# Patient Record
Sex: MALE | Race: WHITE | NOT HISPANIC OR LATINO
[De-identification: names, ages, dates, MRNs, and addresses within clinical notes are randomized per-mention and may not be internally consistent; named-entity substitution may affect disease eponyms.]

---

## 2017-04-11 ENCOUNTER — APPOINTMENT (OUTPATIENT)
Dept: GASTROENTEROLOGY | Facility: CLINIC | Age: 72
End: 2017-04-11

## 2017-04-11 VITALS
DIASTOLIC BLOOD PRESSURE: 68 MMHG | HEIGHT: 70 IN | HEART RATE: 65 BPM | BODY MASS INDEX: 24.77 KG/M2 | SYSTOLIC BLOOD PRESSURE: 118 MMHG | TEMPERATURE: 98.1 F | RESPIRATION RATE: 15 BRPM | OXYGEN SATURATION: 98 % | WEIGHT: 173 LBS

## 2017-04-19 ENCOUNTER — APPOINTMENT (OUTPATIENT)
Dept: OPHTHALMOLOGY | Facility: CLINIC | Age: 72
End: 2017-04-19

## 2017-06-07 ENCOUNTER — APPOINTMENT (OUTPATIENT)
Dept: GASTROENTEROLOGY | Facility: AMBULATORY MEDICAL SERVICES | Age: 72
End: 2017-06-07

## 2018-04-04 ENCOUNTER — NON-APPOINTMENT (OUTPATIENT)
Age: 73
End: 2018-04-04

## 2018-04-04 ENCOUNTER — TRANSCRIPTION ENCOUNTER (OUTPATIENT)
Age: 73
End: 2018-04-04

## 2018-04-04 ENCOUNTER — APPOINTMENT (OUTPATIENT)
Dept: ELECTROPHYSIOLOGY | Facility: CLINIC | Age: 73
End: 2018-04-04
Payer: MEDICARE

## 2018-04-04 VITALS
WEIGHT: 162 LBS | DIASTOLIC BLOOD PRESSURE: 83 MMHG | OXYGEN SATURATION: 98 % | HEIGHT: 70 IN | HEART RATE: 68 BPM | SYSTOLIC BLOOD PRESSURE: 135 MMHG | BODY MASS INDEX: 23.19 KG/M2

## 2018-04-04 PROCEDURE — 99205 OFFICE O/P NEW HI 60 MIN: CPT

## 2018-04-04 PROCEDURE — 93000 ELECTROCARDIOGRAM COMPLETE: CPT

## 2018-04-04 RX ORDER — SODIUM SULFATE, POTASSIUM SULFATE, MAGNESIUM SULFATE 17.5; 3.13; 1.6 G/ML; G/ML; G/ML
17.5-3.13-1.6 SOLUTION, CONCENTRATE ORAL
Qty: 1 | Refills: 0 | Status: DISCONTINUED | COMMUNITY
Start: 2017-04-11 | End: 2018-04-04

## 2018-04-04 RX ORDER — BIOTIN 10 MG
TABLET ORAL
Refills: 0 | Status: ACTIVE | COMMUNITY

## 2018-04-08 ENCOUNTER — FORM ENCOUNTER (OUTPATIENT)
Age: 73
End: 2018-04-08

## 2018-04-24 ENCOUNTER — APPOINTMENT (OUTPATIENT)
Dept: GASTROENTEROLOGY | Facility: CLINIC | Age: 73
End: 2018-04-24

## 2018-05-18 ENCOUNTER — INPATIENT (INPATIENT)
Facility: HOSPITAL | Age: 73
LOS: 1 days | Discharge: HOME | DRG: 438 | End: 2018-05-20
Attending: INTERNAL MEDICINE | Admitting: INTERNAL MEDICINE
Payer: MEDICARE

## 2018-05-18 DIAGNOSIS — Z85.028 PERSONAL HISTORY OF OTHER MALIGNANT NEOPLASM OF STOMACH: ICD-10-CM

## 2018-05-18 DIAGNOSIS — K85.90 ACUTE PANCREATITIS WITHOUT NECROSIS OR INFECTION, UNSPECIFIED: ICD-10-CM

## 2018-05-18 DIAGNOSIS — Z90.3 ACQUIRED ABSENCE OF STOMACH [PART OF]: ICD-10-CM

## 2018-05-18 DIAGNOSIS — Z87.891 PERSONAL HISTORY OF NICOTINE DEPENDENCE: ICD-10-CM

## 2018-05-18 DIAGNOSIS — Z85.528 PERSONAL HISTORY OF OTHER MALIGNANT NEOPLASM OF KIDNEY: ICD-10-CM

## 2018-05-18 DIAGNOSIS — E78.5 HYPERLIPIDEMIA, UNSPECIFIED: ICD-10-CM

## 2018-05-18 DIAGNOSIS — I10 ESSENTIAL (PRIMARY) HYPERTENSION: ICD-10-CM

## 2018-05-18 DIAGNOSIS — I47.1 SUPRAVENTRICULAR TACHYCARDIA: ICD-10-CM

## 2018-05-18 DIAGNOSIS — K56.2 VOLVULUS: ICD-10-CM

## 2018-05-18 PROCEDURE — 99223 1ST HOSP IP/OBS HIGH 75: CPT

## 2018-05-18 PROCEDURE — 76705 ECHO EXAM OF ABDOMEN: CPT | Mod: 26,RT

## 2018-05-18 PROCEDURE — 93010 ELECTROCARDIOGRAM REPORT: CPT

## 2018-05-18 PROCEDURE — 71045 X-RAY EXAM CHEST 1 VIEW: CPT | Mod: 26,76

## 2018-05-18 PROCEDURE — 74176 CT ABD & PELVIS W/O CONTRAST: CPT | Mod: 26

## 2018-05-19 PROCEDURE — 99233 SBSQ HOSP IP/OBS HIGH 50: CPT

## 2018-05-20 PROCEDURE — 96374 THER/PROPH/DIAG INJ IV PUSH: CPT

## 2018-05-20 PROCEDURE — 83605 ASSAY OF LACTIC ACID: CPT

## 2018-05-20 PROCEDURE — 84484 ASSAY OF TROPONIN QUANT: CPT

## 2018-05-20 PROCEDURE — 84100 ASSAY OF PHOSPHORUS: CPT

## 2018-05-20 PROCEDURE — 82248 BILIRUBIN DIRECT: CPT

## 2018-05-20 PROCEDURE — 93005 ELECTROCARDIOGRAM TRACING: CPT

## 2018-05-20 PROCEDURE — 83735 ASSAY OF MAGNESIUM: CPT

## 2018-05-20 PROCEDURE — 83615 LACTATE (LD) (LDH) ENZYME: CPT

## 2018-05-20 PROCEDURE — 96361 HYDRATE IV INFUSION ADD-ON: CPT

## 2018-05-20 PROCEDURE — 76705 ECHO EXAM OF ABDOMEN: CPT

## 2018-05-20 PROCEDURE — 80048 BASIC METABOLIC PNL TOTAL CA: CPT

## 2018-05-20 PROCEDURE — 80053 COMPREHEN METABOLIC PANEL: CPT

## 2018-05-20 PROCEDURE — 99233 SBSQ HOSP IP/OBS HIGH 50: CPT

## 2018-05-20 PROCEDURE — 85027 COMPLETE CBC AUTOMATED: CPT

## 2018-05-20 PROCEDURE — 85730 THROMBOPLASTIN TIME PARTIAL: CPT

## 2018-05-20 PROCEDURE — 85610 PROTHROMBIN TIME: CPT

## 2018-05-20 PROCEDURE — 96376 TX/PRO/DX INJ SAME DRUG ADON: CPT

## 2018-05-20 PROCEDURE — 74018 RADEX ABDOMEN 1 VIEW: CPT

## 2018-05-20 PROCEDURE — 80076 HEPATIC FUNCTION PANEL: CPT

## 2018-05-20 PROCEDURE — 86900 BLOOD TYPING SEROLOGIC ABO: CPT

## 2018-05-20 PROCEDURE — 80061 LIPID PANEL: CPT

## 2018-05-20 PROCEDURE — 74176 CT ABD & PELVIS W/O CONTRAST: CPT

## 2018-05-20 PROCEDURE — 71045 X-RAY EXAM CHEST 1 VIEW: CPT

## 2018-05-20 PROCEDURE — 82150 ASSAY OF AMYLASE: CPT

## 2018-05-20 PROCEDURE — 86850 RBC ANTIBODY SCREEN: CPT

## 2018-05-20 PROCEDURE — 99285 EMERGENCY DEPT VISIT HI MDM: CPT | Mod: 25

## 2018-05-20 PROCEDURE — 74018 RADEX ABDOMEN 1 VIEW: CPT | Mod: 26

## 2018-05-20 PROCEDURE — 96375 TX/PRO/DX INJ NEW DRUG ADDON: CPT

## 2018-05-20 PROCEDURE — 83690 ASSAY OF LIPASE: CPT

## 2018-06-15 ENCOUNTER — APPOINTMENT (OUTPATIENT)
Dept: CT IMAGING | Facility: CLINIC | Age: 73
End: 2018-06-15

## 2018-06-15 ENCOUNTER — OUTPATIENT (OUTPATIENT)
Dept: OUTPATIENT SERVICES | Facility: HOSPITAL | Age: 73
LOS: 1 days | End: 2018-06-15
Payer: MEDICARE

## 2018-06-15 DIAGNOSIS — Z00.8 ENCOUNTER FOR OTHER GENERAL EXAMINATION: ICD-10-CM

## 2018-06-15 PROCEDURE — 74177 CT ABD & PELVIS W/CONTRAST: CPT | Mod: 26

## 2018-06-15 PROCEDURE — 74177 CT ABD & PELVIS W/CONTRAST: CPT

## 2018-06-19 ENCOUNTER — CHART COPY (OUTPATIENT)
Age: 73
End: 2018-06-19

## 2018-08-27 ENCOUNTER — OUTPATIENT (OUTPATIENT)
Dept: OUTPATIENT SERVICES | Facility: HOSPITAL | Age: 73
LOS: 1 days | End: 2018-08-27
Payer: MEDICARE

## 2018-08-27 VITALS
HEART RATE: 59 BPM | DIASTOLIC BLOOD PRESSURE: 81 MMHG | RESPIRATION RATE: 16 BRPM | OXYGEN SATURATION: 100 % | HEIGHT: 69 IN | WEIGHT: 151.9 LBS | SYSTOLIC BLOOD PRESSURE: 130 MMHG | TEMPERATURE: 98 F

## 2018-08-27 DIAGNOSIS — I49.9 CARDIAC ARRHYTHMIA, UNSPECIFIED: ICD-10-CM

## 2018-08-27 DIAGNOSIS — Z01.818 ENCOUNTER FOR OTHER PREPROCEDURAL EXAMINATION: ICD-10-CM

## 2018-08-27 LAB
ANION GAP SERPL CALC-SCNC: 9 MMOL/L — SIGNIFICANT CHANGE UP (ref 5–17)
APTT BLD: 30 SEC — SIGNIFICANT CHANGE UP (ref 27.5–37.4)
BLD GP AB SCN SERPL QL: NEGATIVE — SIGNIFICANT CHANGE UP
BUN SERPL-MCNC: 13 MG/DL — SIGNIFICANT CHANGE UP (ref 7–23)
CALCIUM SERPL-MCNC: 9 MG/DL — SIGNIFICANT CHANGE UP (ref 8.4–10.5)
CHLORIDE SERPL-SCNC: 104 MMOL/L — SIGNIFICANT CHANGE UP (ref 96–108)
CO2 SERPL-SCNC: 25 MMOL/L — SIGNIFICANT CHANGE UP (ref 22–31)
CREAT SERPL-MCNC: 0.9 MG/DL — SIGNIFICANT CHANGE UP (ref 0.5–1.3)
GLUCOSE SERPL-MCNC: 106 MG/DL — HIGH (ref 70–99)
HCT VFR BLD CALC: 39.9 % — SIGNIFICANT CHANGE UP (ref 39–50)
HGB BLD-MCNC: 13.1 G/DL — SIGNIFICANT CHANGE UP (ref 13–17)
INR BLD: 0.95 RATIO — SIGNIFICANT CHANGE UP (ref 0.88–1.16)
MCHC RBC-ENTMCNC: 31 PG — SIGNIFICANT CHANGE UP (ref 27–34)
MCHC RBC-ENTMCNC: 32.8 GM/DL — SIGNIFICANT CHANGE UP (ref 32–36)
MCV RBC AUTO: 94.4 FL — SIGNIFICANT CHANGE UP (ref 80–100)
PLATELET # BLD AUTO: 174 K/UL — SIGNIFICANT CHANGE UP (ref 150–400)
POTASSIUM SERPL-MCNC: 4.1 MMOL/L — SIGNIFICANT CHANGE UP (ref 3.5–5.3)
POTASSIUM SERPL-SCNC: 4.1 MMOL/L — SIGNIFICANT CHANGE UP (ref 3.5–5.3)
PROTHROM AB SERPL-ACNC: 10.3 SEC — SIGNIFICANT CHANGE UP (ref 9.8–12.7)
RBC # BLD: 4.22 M/UL — SIGNIFICANT CHANGE UP (ref 4.2–5.8)
RBC # FLD: 13.2 % — SIGNIFICANT CHANGE UP (ref 10.3–14.5)
RH IG SCN BLD-IMP: POSITIVE — SIGNIFICANT CHANGE UP
SODIUM SERPL-SCNC: 138 MMOL/L — SIGNIFICANT CHANGE UP (ref 135–145)
WBC # BLD: 3.1 K/UL — LOW (ref 3.8–10.5)
WBC # FLD AUTO: 3.1 K/UL — LOW (ref 3.8–10.5)

## 2018-08-27 PROCEDURE — 93010 ELECTROCARDIOGRAM REPORT: CPT

## 2018-08-27 NOTE — H&P CARDIOLOGY - PMH
HTN (hypertension)    Pancreatitis    Renal cell carcinoma, unspecified laterality    SVT (supraventricular tachycardia)

## 2018-08-27 NOTE — H&P CARDIOLOGY - HISTORY OF PRESENT ILLNESS
73 year old  male with pmhx HTN, renal cell CA, partial nephrectomy, and  gastrectomy presents as a PST for SVT ablation on 8/28/18.  The patient states he has felt palpitations for over a year. Initially, the palpitations occurred while he was playing tennis and eventually progressed to occurring at rest. He states vagal maneuvers have helped in the past, but are not working anymore. The patient resides in [place] and was noted to be in SVT while admitted for pancreatitis in  Feb 2018. He was evaluated by Dr. Gresham and placed on a holter monitor which revealed episodes of SVT. The patien was advised to hold his metoprolol 2 days prior to procedure.  He currently denies chest pain, shortness of breath, dizziness or orthopnea.

## 2018-08-28 ENCOUNTER — OUTPATIENT (OUTPATIENT)
Dept: INPATIENT UNIT | Facility: HOSPITAL | Age: 73
LOS: 1 days | End: 2018-08-28
Payer: MEDICARE

## 2018-08-28 DIAGNOSIS — I49.9 CARDIAC ARRHYTHMIA, UNSPECIFIED: ICD-10-CM

## 2018-08-28 DIAGNOSIS — Z90.49 ACQUIRED ABSENCE OF OTHER SPECIFIED PARTS OF DIGESTIVE TRACT: Chronic | ICD-10-CM

## 2018-08-28 DIAGNOSIS — Z90.5 ACQUIRED ABSENCE OF KIDNEY: Chronic | ICD-10-CM

## 2018-08-28 DIAGNOSIS — Z90.3 ACQUIRED ABSENCE OF STOMACH [PART OF]: Chronic | ICD-10-CM

## 2018-08-28 DIAGNOSIS — Z98.890 OTHER SPECIFIED POSTPROCEDURAL STATES: Chronic | ICD-10-CM

## 2018-08-28 LAB
BLD GP AB SCN SERPL QL: NEGATIVE — SIGNIFICANT CHANGE UP
RH IG SCN BLD-IMP: POSITIVE — SIGNIFICANT CHANGE UP

## 2018-08-28 PROCEDURE — 93653 COMPRE EP EVAL TX SVT: CPT

## 2018-08-28 PROCEDURE — 85610 PROTHROMBIN TIME: CPT

## 2018-08-28 PROCEDURE — 93005 ELECTROCARDIOGRAM TRACING: CPT

## 2018-08-28 PROCEDURE — 85027 COMPLETE CBC AUTOMATED: CPT

## 2018-08-28 PROCEDURE — 86900 BLOOD TYPING SEROLOGIC ABO: CPT

## 2018-08-28 PROCEDURE — 93621 COMP EP EVL L PAC&REC C SINS: CPT

## 2018-08-28 PROCEDURE — 85730 THROMBOPLASTIN TIME PARTIAL: CPT

## 2018-08-28 PROCEDURE — 86850 RBC ANTIBODY SCREEN: CPT

## 2018-08-28 PROCEDURE — 80048 BASIC METABOLIC PNL TOTAL CA: CPT

## 2018-08-28 PROCEDURE — 93613 INTRACARDIAC EPHYS 3D MAPG: CPT

## 2018-08-28 PROCEDURE — 93623 PRGRMD STIMJ&PACG IV RX NFS: CPT

## 2018-08-28 PROCEDURE — 86901 BLOOD TYPING SEROLOGIC RH(D): CPT

## 2018-08-28 PROCEDURE — 93662 INTRACARDIAC ECG (ICE): CPT

## 2018-08-28 PROCEDURE — C1732: CPT

## 2018-08-28 PROCEDURE — C1894: CPT

## 2018-08-28 PROCEDURE — C1766: CPT

## 2018-08-28 PROCEDURE — C1730: CPT

## 2018-09-05 RX ORDER — ATENOLOL 25 MG/1
40 TABLET ORAL
Qty: 0 | Refills: 0 | COMMUNITY

## 2018-09-17 ENCOUNTER — APPOINTMENT (OUTPATIENT)
Dept: ELECTROPHYSIOLOGY | Facility: CLINIC | Age: 73
End: 2018-09-17

## 2018-09-21 ENCOUNTER — APPOINTMENT (OUTPATIENT)
Dept: GASTROENTEROLOGY | Facility: CLINIC | Age: 73
End: 2018-09-21
Payer: MEDICARE

## 2018-09-21 VITALS
SYSTOLIC BLOOD PRESSURE: 116 MMHG | RESPIRATION RATE: 15 BRPM | OXYGEN SATURATION: 98 % | BODY MASS INDEX: 22.19 KG/M2 | HEIGHT: 70 IN | TEMPERATURE: 98 F | HEART RATE: 70 BPM | DIASTOLIC BLOOD PRESSURE: 60 MMHG | WEIGHT: 155 LBS

## 2018-09-21 PROCEDURE — 99214 OFFICE O/P EST MOD 30 MIN: CPT

## 2019-06-21 PROBLEM — I10 ESSENTIAL (PRIMARY) HYPERTENSION: Chronic | Status: ACTIVE | Noted: 2018-08-27

## 2019-06-24 ENCOUNTER — APPOINTMENT (OUTPATIENT)
Dept: RADIOLOGY | Facility: HOSPITAL | Age: 74
End: 2019-06-24
Payer: MEDICARE

## 2019-06-24 ENCOUNTER — INPATIENT (INPATIENT)
Facility: HOSPITAL | Age: 74
LOS: 0 days | Discharge: HOME | End: 2019-06-25
Attending: HOSPITALIST | Admitting: HOSPITALIST
Payer: MEDICARE

## 2019-06-24 ENCOUNTER — OUTPATIENT (OUTPATIENT)
Dept: OUTPATIENT SERVICES | Facility: HOSPITAL | Age: 74
LOS: 1 days | End: 2019-06-24

## 2019-06-24 ENCOUNTER — TRANSCRIPTION ENCOUNTER (OUTPATIENT)
Age: 74
End: 2019-06-24

## 2019-06-24 VITALS
OXYGEN SATURATION: 100 % | RESPIRATION RATE: 16 BRPM | DIASTOLIC BLOOD PRESSURE: 92 MMHG | TEMPERATURE: 99 F | HEART RATE: 75 BPM | SYSTOLIC BLOOD PRESSURE: 141 MMHG

## 2019-06-24 DIAGNOSIS — Z98.890 OTHER SPECIFIED POSTPROCEDURAL STATES: Chronic | ICD-10-CM

## 2019-06-24 DIAGNOSIS — Z90.3 ACQUIRED ABSENCE OF STOMACH [PART OF]: Chronic | ICD-10-CM

## 2019-06-24 DIAGNOSIS — I47.1 SUPRAVENTRICULAR TACHYCARDIA: ICD-10-CM

## 2019-06-24 DIAGNOSIS — Z90.49 ACQUIRED ABSENCE OF OTHER SPECIFIED PARTS OF DIGESTIVE TRACT: Chronic | ICD-10-CM

## 2019-06-24 DIAGNOSIS — Z29.9 ENCOUNTER FOR PROPHYLACTIC MEASURES, UNSPECIFIED: ICD-10-CM

## 2019-06-24 DIAGNOSIS — Z90.5 ACQUIRED ABSENCE OF KIDNEY: Chronic | ICD-10-CM

## 2019-06-24 DIAGNOSIS — C16.9 MALIGNANT NEOPLASM OF STOMACH, UNSPECIFIED: ICD-10-CM

## 2019-06-24 DIAGNOSIS — K92.0 HEMATEMESIS: ICD-10-CM

## 2019-06-24 LAB
ALBUMIN SERPL ELPH-MCNC: 3.7 G/DL — SIGNIFICANT CHANGE UP (ref 3.3–5)
ALP SERPL-CCNC: 85 U/L — SIGNIFICANT CHANGE UP (ref 40–120)
ALT FLD-CCNC: 17 U/L — SIGNIFICANT CHANGE UP (ref 4–41)
ANION GAP SERPL CALC-SCNC: 12 MMO/L — SIGNIFICANT CHANGE UP (ref 7–14)
APTT BLD: 28.7 SEC — SIGNIFICANT CHANGE UP (ref 27.5–36.3)
AST SERPL-CCNC: 27 U/L — SIGNIFICANT CHANGE UP (ref 4–40)
BASOPHILS # BLD AUTO: 0.01 K/UL — SIGNIFICANT CHANGE UP (ref 0–0.2)
BASOPHILS NFR BLD AUTO: 0.2 % — SIGNIFICANT CHANGE UP (ref 0–2)
BILIRUB SERPL-MCNC: 0.6 MG/DL — SIGNIFICANT CHANGE UP (ref 0.2–1.2)
BLD GP AB SCN SERPL QL: NEGATIVE — SIGNIFICANT CHANGE UP
BUN SERPL-MCNC: 21 MG/DL — SIGNIFICANT CHANGE UP (ref 7–23)
CALCIUM SERPL-MCNC: 8.7 MG/DL — SIGNIFICANT CHANGE UP (ref 8.4–10.5)
CHLORIDE SERPL-SCNC: 106 MMOL/L — SIGNIFICANT CHANGE UP (ref 98–107)
CO2 SERPL-SCNC: 22 MMOL/L — SIGNIFICANT CHANGE UP (ref 22–31)
CREAT SERPL-MCNC: 0.69 MG/DL — SIGNIFICANT CHANGE UP (ref 0.5–1.3)
EOSINOPHIL # BLD AUTO: 0.03 K/UL — SIGNIFICANT CHANGE UP (ref 0–0.5)
EOSINOPHIL NFR BLD AUTO: 0.5 % — SIGNIFICANT CHANGE UP (ref 0–6)
GLUCOSE SERPL-MCNC: 103 MG/DL — HIGH (ref 70–99)
HCT VFR BLD CALC: 35 % — LOW (ref 39–50)
HGB BLD-MCNC: 10.8 G/DL — LOW (ref 13–17)
IMM GRANULOCYTES NFR BLD AUTO: 0.2 % — SIGNIFICANT CHANGE UP (ref 0–1.5)
INR BLD: 1.05 — SIGNIFICANT CHANGE UP (ref 0.88–1.17)
LYMPHOCYTES # BLD AUTO: 1.15 K/UL — SIGNIFICANT CHANGE UP (ref 1–3.3)
LYMPHOCYTES # BLD AUTO: 17.9 % — SIGNIFICANT CHANGE UP (ref 13–44)
MCHC RBC-ENTMCNC: 24.8 PG — LOW (ref 27–34)
MCHC RBC-ENTMCNC: 30.9 % — LOW (ref 32–36)
MCV RBC AUTO: 80.5 FL — SIGNIFICANT CHANGE UP (ref 80–100)
MONOCYTES # BLD AUTO: 0.5 K/UL — SIGNIFICANT CHANGE UP (ref 0–0.9)
MONOCYTES NFR BLD AUTO: 7.8 % — SIGNIFICANT CHANGE UP (ref 2–14)
NEUTROPHILS # BLD AUTO: 4.72 K/UL — SIGNIFICANT CHANGE UP (ref 1.8–7.4)
NEUTROPHILS NFR BLD AUTO: 73.4 % — SIGNIFICANT CHANGE UP (ref 43–77)
NRBC # FLD: 0.02 K/UL — SIGNIFICANT CHANGE UP (ref 0–0)
PLATELET # BLD AUTO: 203 K/UL — SIGNIFICANT CHANGE UP (ref 150–400)
PMV BLD: 9.2 FL — SIGNIFICANT CHANGE UP (ref 7–13)
POTASSIUM SERPL-MCNC: 4.4 MMOL/L — SIGNIFICANT CHANGE UP (ref 3.5–5.3)
POTASSIUM SERPL-SCNC: 4.4 MMOL/L — SIGNIFICANT CHANGE UP (ref 3.5–5.3)
PROT SERPL-MCNC: 7.2 G/DL — SIGNIFICANT CHANGE UP (ref 6–8.3)
PROTHROM AB SERPL-ACNC: 11.7 SEC — SIGNIFICANT CHANGE UP (ref 9.8–13.1)
RBC # BLD: 4.35 M/UL — SIGNIFICANT CHANGE UP (ref 4.2–5.8)
RBC # FLD: 26.5 % — HIGH (ref 10.3–14.5)
RH IG SCN BLD-IMP: POSITIVE — SIGNIFICANT CHANGE UP
SODIUM SERPL-SCNC: 140 MMOL/L — SIGNIFICANT CHANGE UP (ref 135–145)
WBC # BLD: 6.42 K/UL — SIGNIFICANT CHANGE UP (ref 3.8–10.5)
WBC # FLD AUTO: 6.42 K/UL — SIGNIFICANT CHANGE UP (ref 3.8–10.5)

## 2019-06-24 PROCEDURE — 74241: CPT | Mod: 26

## 2019-06-24 PROCEDURE — 99223 1ST HOSP IP/OBS HIGH 75: CPT

## 2019-06-24 PROCEDURE — 71045 X-RAY EXAM CHEST 1 VIEW: CPT | Mod: 26

## 2019-06-24 RX ORDER — PANTOPRAZOLE SODIUM 20 MG/1
40 TABLET, DELAYED RELEASE ORAL ONCE
Refills: 0 | Status: COMPLETED | OUTPATIENT
Start: 2019-06-24 | End: 2019-06-24

## 2019-06-24 RX ORDER — ONDANSETRON 8 MG/1
4 TABLET, FILM COATED ORAL ONCE
Refills: 0 | Status: COMPLETED | OUTPATIENT
Start: 2019-06-24 | End: 2019-06-24

## 2019-06-24 RX ORDER — SUCRALFATE 1 G
1 TABLET ORAL ONCE
Refills: 0 | Status: COMPLETED | OUTPATIENT
Start: 2019-06-24 | End: 2019-06-24

## 2019-06-24 RX ORDER — PANTOPRAZOLE SODIUM 20 MG/1
40 TABLET, DELAYED RELEASE ORAL
Refills: 0 | Status: DISCONTINUED | OUTPATIENT
Start: 2019-06-24 | End: 2019-06-25

## 2019-06-24 RX ORDER — SUCRALFATE 1 G
1 TABLET ORAL EVERY 6 HOURS
Refills: 0 | Status: DISCONTINUED | OUTPATIENT
Start: 2019-06-24 | End: 2019-06-25

## 2019-06-24 RX ORDER — SUCRALFATE 1 G
1 TABLET ORAL
Qty: 0 | Refills: 0 | DISCHARGE

## 2019-06-24 RX ORDER — METOPROLOL TARTRATE 50 MG
1 TABLET ORAL
Qty: 0 | Refills: 0 | DISCHARGE

## 2019-06-24 RX ADMIN — Medication 30 MILLILITER(S): at 12:14

## 2019-06-24 RX ADMIN — ONDANSETRON 4 MILLIGRAM(S): 8 TABLET, FILM COATED ORAL at 12:14

## 2019-06-24 RX ADMIN — Medication 1 GRAM(S): at 12:20

## 2019-06-24 RX ADMIN — Medication 1 GRAM(S): at 23:46

## 2019-06-24 RX ADMIN — PANTOPRAZOLE SODIUM 40 MILLIGRAM(S): 20 TABLET, DELAYED RELEASE ORAL at 12:13

## 2019-06-24 NOTE — ED PROVIDER NOTE - ATTENDING CONTRIBUTION TO CARE
I performed a face-to-face evaluation of the patient and performed a history and physical examination. I agree with the history and physical examination.    Makeda: Gastric cancer, early satiety, today vomited (possibly ~100 cc blood). Labs, admit, PPI.

## 2019-06-24 NOTE — ED PROVIDER NOTE - NS ED ROS FT
GENERAL: no fever, chills  HEENT: no cough, congestion, odynophagia, dysphagia  CARDIAC: no chest pain, palpitations, lightheadedness  PULM: no dyspnea, wheezing   GI: + hematemesis  : no urinary dysuria, frequency, incontinence, hematuria  NEURO: no headache, motor weakness, sensory changes  MSK: no joint or muscle pain  SKIN: no rashes, hives  HEME: no active bleeding, bruising

## 2019-06-24 NOTE — ED ADULT TRIAGE NOTE - CHIEF COMPLAINT QUOTE
pt comes to ED from up stairs pt was at a speech and swallow exam and threw up around 100 ml of blood. pt states he has not been able to since end of February. pt has hx of ruptured intestine and complete gastrectomy. pt VSS no bleeding noted in triage

## 2019-06-24 NOTE — H&P ADULT - NSICDXPASTSURGICALHX_GEN_ALL_CORE_FT
PAST SURGICAL HISTORY:  H/O partial nephrectomy     H/O umbilical hernia repair     S/P cholecystectomy     S/P gastrectomy

## 2019-06-24 NOTE — H&P ADULT - NSHPREVIEWOFSYSTEMS_GEN_ALL_CORE
Review of Systems:   CONSTITUTIONAL: No fever; + weight loss  EYES: No eye pain, visual disturbances, or discharge  ENMT:  No difficulty hearing, tinnitus, vertigo; No sinus or throat pain  NECK: No pain or stiffness  RESPIRATORY: No cough, wheezing, chills or hemoptysis; No shortness of breath  CARDIOVASCULAR: No chest pain, palpitations, dizziness, or leg swelling  GASTROINTESTINAL: No abdominal or epigastric pain. +hematemesis; No diarrhea or constipation. No melena or hematochezia.  GENITOURINARY: No dysuria, frequency, hematuria, or incontinence  NEUROLOGICAL: No headaches, memory loss, loss of strength, numbness, or tremors  SKIN: No itching, burning, rashes, or lesions   LYMPH NODES: No enlarged glands  ENDOCRINE: No heat or cold intolerance; No hair loss  MUSCULOSKELETAL: No joint pain or swelling; No muscle, back, or extremity pain

## 2019-06-24 NOTE — ED ADULT NURSE NOTE - OBJECTIVE STATEMENT
Receive pt in spot 29 alert and oriented x 3  c/o N/V with blood  , dizziness and chest burning after drinking contrast during  upper GI series testing.  Denies abd pain, diarrhea, chills.  IV 20 G placed. Labs sent.  Skin intact VS see charting

## 2019-06-24 NOTE — H&P ADULT - NSHPPHYSICALEXAM_GEN_ALL_CORE
PHYSICAL EXAM:      Constitutional: NAD, well-groomed, well-developed  HEENT: PERRLA, EOMI, Normal Hearing  Neck: No LAD, No JVD  Back: Normal spine flexure, No CVA tenderness  Respiratory: CTAB  Cardiovascular: S1 and S2, RRR, no M/G/R  Gastrointestinal: BS+, soft, tenderness at LUQ on palpation. mid abd surgical scar clean and dry  Extremities: No peripheral edema  Vascular: 2+ peripheral pulses  Neurological: A/O x 3, no focal deficits  Psychiatric: Normal mood, normal affect  Musculoskeletal: 5/5 strength b/l upper and lower extremities  Skin: No rashes

## 2019-06-24 NOTE — H&P ADULT - PROBLEM SELECTOR PLAN 1
-npo post midnight for egd tomorrow; gi to be called by Dr Garcia  -ppi, Carafate  -if not recently done, would order CT abd to r/o extra-GI tract etiology for post-prandial vomiting

## 2019-06-24 NOTE — ED PROVIDER NOTE - OBJECTIVE STATEMENT
73M with hx of HTN, stomach ca (s/p gastrectomy), RCC (s/p partial nephrectomy), bowel perforation, cholecystectomy presenting with hematemesis. Patient was getting an upper GI series for several months of early satiety, 20 pound weight loss. After drinking the contrast, threw up 4-5 times, and per doctor taking care of him there, threw up 100 cc of blood. Patient c/o nausea, burning in chest and dizziness. Denies abdominal pain, blood in stool, dyspnea. 73M with hx of HTN, stomach ca (s/p gastrectomy), RCC (s/p partial nephrectomy), bowel perforation, cholecystectomy presenting with hematemesis. Patient was getting an upper GI series for several months of early satiety, 20 pound weight loss. After drinking the contrast, threw up 4-5 times, and per doctor taking care of him there, threw up 100 cc of blood. Patient c/o nausea, burning in chest and dizziness. Recently treated for CMV esophagitis, finished course of gancyclovir. Denies abdominal pain, blood in stool, dyspnea.

## 2019-06-24 NOTE — ED ADULT NURSE REASSESSMENT NOTE - NS ED NURSE REASSESS COMMENT FT1
pt vs as charted. pt c/o mild discomfort, but reports improvement in nausea and denies any vomiting episodes. pt breathing even/unlabored. denies any new complaints. family at bedside. awaiting bed assignment. will continue to monitor.

## 2019-06-24 NOTE — H&P ADULT - NSICDXPASTMEDICALHX_GEN_ALL_CORE_FT
PAST MEDICAL HISTORY:  HTN (hypertension)     Pancreatitis     Renal cell carcinoma, unspecified laterality     SVT (supraventricular tachycardia)

## 2019-06-25 ENCOUNTER — TRANSCRIPTION ENCOUNTER (OUTPATIENT)
Age: 74
End: 2019-06-25

## 2019-06-25 VITALS
SYSTOLIC BLOOD PRESSURE: 129 MMHG | OXYGEN SATURATION: 100 % | HEART RATE: 67 BPM | DIASTOLIC BLOOD PRESSURE: 84 MMHG | RESPIRATION RATE: 18 BRPM | TEMPERATURE: 98 F

## 2019-06-25 LAB
ANION GAP SERPL CALC-SCNC: 12 MMO/L — SIGNIFICANT CHANGE UP (ref 7–14)
BUN SERPL-MCNC: 19 MG/DL — SIGNIFICANT CHANGE UP (ref 7–23)
CALCIUM SERPL-MCNC: 8.7 MG/DL — SIGNIFICANT CHANGE UP (ref 8.4–10.5)
CHLORIDE SERPL-SCNC: 104 MMOL/L — SIGNIFICANT CHANGE UP (ref 98–107)
CO2 SERPL-SCNC: 22 MMOL/L — SIGNIFICANT CHANGE UP (ref 22–31)
CREAT SERPL-MCNC: 0.68 MG/DL — SIGNIFICANT CHANGE UP (ref 0.5–1.3)
GLUCOSE SERPL-MCNC: 87 MG/DL — SIGNIFICANT CHANGE UP (ref 70–99)
HCT VFR BLD CALC: 33.6 % — LOW (ref 39–50)
HGB BLD-MCNC: 10.7 G/DL — LOW (ref 13–17)
MAGNESIUM SERPL-MCNC: 2 MG/DL — SIGNIFICANT CHANGE UP (ref 1.6–2.6)
MCHC RBC-ENTMCNC: 25.2 PG — LOW (ref 27–34)
MCHC RBC-ENTMCNC: 31.8 % — LOW (ref 32–36)
MCV RBC AUTO: 79.2 FL — LOW (ref 80–100)
NRBC # FLD: 0 K/UL — SIGNIFICANT CHANGE UP (ref 0–0)
PHOSPHATE SERPL-MCNC: 1.5 MG/DL — LOW (ref 2.5–4.5)
PLATELET # BLD AUTO: 195 K/UL — SIGNIFICANT CHANGE UP (ref 150–400)
PMV BLD: 9.2 FL — SIGNIFICANT CHANGE UP (ref 7–13)
POTASSIUM SERPL-MCNC: 3.9 MMOL/L — SIGNIFICANT CHANGE UP (ref 3.5–5.3)
POTASSIUM SERPL-SCNC: 3.9 MMOL/L — SIGNIFICANT CHANGE UP (ref 3.5–5.3)
RBC # BLD: 4.24 M/UL — SIGNIFICANT CHANGE UP (ref 4.2–5.8)
RBC # FLD: 26.3 % — HIGH (ref 10.3–14.5)
SODIUM SERPL-SCNC: 138 MMOL/L — SIGNIFICANT CHANGE UP (ref 135–145)
WBC # BLD: 4.8 K/UL — SIGNIFICANT CHANGE UP (ref 3.8–10.5)
WBC # FLD AUTO: 4.8 K/UL — SIGNIFICANT CHANGE UP (ref 3.8–10.5)

## 2019-06-25 RX ORDER — SUCRALFATE 1 G
1 TABLET ORAL
Qty: 0 | Refills: 0 | DISCHARGE
Start: 2019-06-25

## 2019-06-25 RX ORDER — SUCRALFATE 1 G
10 TABLET ORAL
Qty: 0 | Refills: 0 | DISCHARGE

## 2019-06-25 RX ORDER — POTASSIUM PHOSPHATE, MONOBASIC POTASSIUM PHOSPHATE, DIBASIC 236; 224 MG/ML; MG/ML
30 INJECTION, SOLUTION INTRAVENOUS ONCE
Refills: 0 | Status: COMPLETED | OUTPATIENT
Start: 2019-06-25 | End: 2019-06-25

## 2019-06-25 RX ORDER — PANTOPRAZOLE SODIUM 20 MG/1
1 TABLET, DELAYED RELEASE ORAL
Qty: 30 | Refills: 0
Start: 2019-06-25 | End: 2019-07-24

## 2019-06-25 RX ADMIN — Medication 1 GRAM(S): at 05:42

## 2019-06-25 RX ADMIN — Medication 1 GRAM(S): at 13:52

## 2019-06-25 RX ADMIN — POTASSIUM PHOSPHATE, MONOBASIC POTASSIUM PHOSPHATE, DIBASIC 83.33 MILLIMOLE(S): 236; 224 INJECTION, SOLUTION INTRAVENOUS at 08:41

## 2019-06-25 RX ADMIN — PANTOPRAZOLE SODIUM 40 MILLIGRAM(S): 20 TABLET, DELAYED RELEASE ORAL at 05:44

## 2019-06-25 NOTE — DISCHARGE NOTE PROVIDER - NSDCCPCAREPLAN_GEN_ALL_CORE_FT
PRINCIPAL DISCHARGE DIAGNOSIS  Diagnosis: Hematemesis  Assessment and Plan of Treatment: You were admitted for episode of bloody vomitus. Your hemoglobin remained stable. You did not have any further episodes while admitted. Please follow up at [facility] on Thursday June 25th 2019 at 8:30AM with Dr. Crocker for an outpatient endoscopy. Please report to [facility] 1 hr prior to your appointment.      SECONDARY DISCHARGE DIAGNOSES  Diagnosis: Hypophosphatemia  Assessment and Plan of Treatment: Your phosphorus levels were noted to be low, and you were given IV phosphorus. Please repeat phosphorus levels as outpatient with your PCP.    Diagnosis: SVT (supraventricular tachycardia)  Assessment and Plan of Treatment: Continue your medications as directed and please follow-up as an outpatient with your primary care provider for further care and recommendations.

## 2019-06-25 NOTE — DISCHARGE NOTE PROVIDER - HOSPITAL COURSE
73M with hx of HTN, stomach ca (s/p gastrectomy), RCC (s/p partial nephrectomy), bowel perforation, cholecystectomy presenting with hematemesis.        HOSPITAL COURSE    Hematemesis with nausea.      - Reports several months of odynophagia as well as esophageal dysphagia, post prandial vomiting (10-20 minutes post po intake) and GERD. Underwent EGD end of March that revealed CMV esophagitis, completed antiviral course at end of May with some symptomatic improvement but no resolution. Recent worsening of GI symptoms prompted barium study today, but pt with hematemesis following ingestion of barium contrast.    - H/H remained stable - no episodes of hematemesis while inpatient, pt to follow up with OP GI attg Dr. Crocker for OP EGD    -PPI, Carafate        SVT (supraventricular tachycardia).     -off BB since surgery    -currently in SR        Dispo: DC home, pt to follow up as outpatient with OP GI, Dr. Crocker for EGD 73 M with hx of HTN, stomach ca (s/p gastrectomy), RCC (s/p partial nephrectomy), bowel perforation, cholecystectomy presenting with hematemesis.        HOSPITAL COURSE    Hematemesis with nausea.      - Reports several months of odynophagia as well as esophageal dysphagia, post prandial vomiting (10-20 minutes post po intake) and GERD. Underwent EGD end of March that revealed CMV esophagitis, completed antiviral course at end of May with some symptomatic improvement but no resolution. Recent worsening of GI symptoms prompted barium study today, but pt with hematemesis following ingestion of barium contrast.    - H/H remained stable - no episodes of hematemesis while inpatient, pt to follow up with OP GI attg Dr. Crocker for OP EGD    -PPI, Carafate        SVT (supraventricular tachycardia).     -off BB since surgery    -currently in SR        Dispo: DC home, pt to follow up as outpatient with OP GI, Dr. Crocker for EGD            Attending Addendum:     Patient seen and examined by me on the discharge day.     no cp, no sob, no n/v/d. no abdominal pain. no headache, no dizziness.     No further episode of hematemesis. Hgb is stable.    d/w pt's GI Dr. Crocker at [facility]. Pt is scheduled for EGD this Thursday at [facility] 8:30 am.    dc planning home today. d/w pt, GI and BRODERICK Tracy.     More than 30 mins were spent evaluating patient and coordinating care for discharge.     All questions answered in details. Follow up plan explained.

## 2019-06-25 NOTE — CONSULT NOTE ADULT - ASSESSMENT
no overt gib. pt with vomiting, upper gi negative.  would advance diet.  [facility]

## 2019-06-25 NOTE — DISCHARGE NOTE PROVIDER - CARE PROVIDER_API CALL
Dr. Crocker,   [facility] - June 27th, 2019 at 8:30AM - please report an hour early prior to your appt  Phone: (   )    -  Fax: (   )    -  Follow Up Time:

## 2019-06-25 NOTE — DISCHARGE NOTE NURSING/CASE MANAGEMENT/SOCIAL WORK - NSDCDPATPORTLINK_GEN_ALL_CORE
You can access the [facility] Patient Portal, offered by [facility], by registering with the following website: [email or web address]

## 2019-06-25 NOTE — CONSULT NOTE ADULT - SUBJECTIVE AND OBJECTIVE BOX
MILAD ROLL:[id number],   73yMale followed for:  penicillin (Unknown)  sulfa drugs (Unknown)    PAST MEDICAL & SURGICAL HISTORY:  SVT (supraventricular tachycardia)  Pancreatitis  Renal cell carcinoma, unspecified laterality  HTN (hypertension)  H/O umbilical hernia repair  H/O partial nephrectomy  S/P cholecystectomy  S/P gastrectomy    FAMILY HISTORY:  No pertinent family history in first degree relatives    MEDICATIONS  (STANDING):  pantoprazole  Injectable 40 milliGRAM(s) IV Push two times a day  potassium phosphate IVPB 30 milliMole(s) IV Intermittent once  sucralfate 1 Gram(s) Oral every 6 hours    MEDICATIONS  (PRN):      Vital Signs Last 24 Hrs  T(C): 36.6 (25 Jun 2019 05:38), Max: 37.2 (24 Jun 2019 21:26)  T(F): 97.9 (25 Jun 2019 05:38), Max: 98.9 (24 Jun 2019 21:26)  HR: 74 (25 Jun 2019 05:38) (71 - 77)  BP: 127/75 (25 Jun 2019 05:38) (110/73 - 141/92)  BP(mean): --  RR: 18 (25 Jun 2019 05:38) (16 - 18)  SpO2: 98% (25 Jun 2019 05:38) (98% - 100%)  nc/at  s1s2  cta  soft, nt, nd no guarding or rebound  no c/c/e    CBC Full  -  ( 25 Jun 2019 05:41 )  WBC Count : 4.80 K/uL  RBC Count : 4.24 M/uL  Hemoglobin : 10.7 g/dL  Hematocrit : 33.6 %  Platelet Count - Automated : 195 K/uL  Mean Cell Volume : 79.2 fL  Mean Cell Hemoglobin : 25.2 pg  Mean Cell Hemoglobin Concentration : 31.8 %  Auto Neutrophil # : x  Auto Lymphocyte # : x  Auto Monocyte # : x  Auto Eosinophil # : x  Auto Basophil # : x  Auto Neutrophil % : x  Auto Lymphocyte % : x  Auto Monocyte % : x  Auto Eosinophil % : x  Auto Basophil % : x    06-25    138  |  104  |  19  ----------------------------<  87  3.9   |  22  |  0.68    Ca    8.7      25 Jun 2019 05:41  Phos  1.5     06-25  Mg     2.0     06-25    TPro  7.2  /  Alb  3.7  /  TBili  0.6  /  DBili  x   /  AST  27  /  ALT  17  /  AlkPhos  85  06-24    PT/INR - ( 24 Jun 2019 12:33 )   PT: 11.7 SEC;   INR: 1.05          PTT - ( 24 Jun 2019 12:33 )  PTT:28.7 SEC

## 2019-06-25 NOTE — DISCHARGE NOTE PROVIDER - PROVIDER TOKENS
FREE:[LAST:[Dr. Crocker],PHONE:[(   )    -],FAX:[(   )    -],ADDRESS:[[facility] - June 27th, 2019 at 8:30AM - please report an hour early prior to your appt]]

## 2019-06-27 ENCOUNTER — OUTPATIENT (OUTPATIENT)
Dept: OUTPATIENT SERVICES | Facility: HOSPITAL | Age: 74
LOS: 1 days | End: 2019-06-27
Payer: MEDICARE

## 2019-06-27 ENCOUNTER — RESULT REVIEW (OUTPATIENT)
Age: 74
End: 2019-06-27

## 2019-06-27 DIAGNOSIS — Z90.3 ACQUIRED ABSENCE OF STOMACH [PART OF]: Chronic | ICD-10-CM

## 2019-06-27 DIAGNOSIS — Z98.890 OTHER SPECIFIED POSTPROCEDURAL STATES: Chronic | ICD-10-CM

## 2019-06-27 DIAGNOSIS — Z90.5 ACQUIRED ABSENCE OF KIDNEY: Chronic | ICD-10-CM

## 2019-06-27 DIAGNOSIS — R13.10 DYSPHAGIA, UNSPECIFIED: ICD-10-CM

## 2019-06-27 DIAGNOSIS — Z90.49 ACQUIRED ABSENCE OF OTHER SPECIFIED PARTS OF DIGESTIVE TRACT: Chronic | ICD-10-CM

## 2019-06-27 PROCEDURE — 88312 SPECIAL STAINS GROUP 1: CPT | Mod: 26

## 2019-06-27 PROCEDURE — 88305 TISSUE EXAM BY PATHOLOGIST: CPT | Mod: 26

## 2019-06-27 PROCEDURE — 88342 IMHCHEM/IMCYTCHM 1ST ANTB: CPT | Mod: 26

## 2019-06-28 PROCEDURE — 88305 TISSUE EXAM BY PATHOLOGIST: CPT

## 2019-06-28 PROCEDURE — 88341 IMHCHEM/IMCYTCHM EA ADD ANTB: CPT

## 2019-06-28 PROCEDURE — 43239 EGD BIOPSY SINGLE/MULTIPLE: CPT

## 2019-06-28 PROCEDURE — 88312 SPECIAL STAINS GROUP 1: CPT

## 2019-07-26 ENCOUNTER — APPOINTMENT (OUTPATIENT)
Dept: WOUND CARE | Facility: CLINIC | Age: 74
End: 2019-07-26
Payer: MEDICARE

## 2019-07-26 PROCEDURE — 11042 DBRDMT SUBQ TIS 1ST 20SQCM/<: CPT

## 2019-07-26 PROCEDURE — 99202 OFFICE O/P NEW SF 15 MIN: CPT

## 2019-07-26 NOTE — ASSESSMENT
[Verbal] : Verbal [Dressing changes] : dressing changes [Written] : Written [Stable] : stable [Home] : Home [FreeTextEntry1] : The patient presents with recurrent nonhealing wounds to the abdomen.  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  F/u with ID \par possible CT scan if no improvement\par No clinical sign of infection\par Recommendation:\par Apply ----iodoform\par Change dressing ---daily/\par \par -----Wound supplies ordered via [employer]\par Patient given contact information\par \par -----Homecare declined\par \par Follow up appointment scheduled for 1-2 weeks\par \par  [Ambulatory] : Ambulatory

## 2019-07-26 NOTE — PHYSICAL EXAM
[Normal Breath Sounds] : Normal breath sounds [JVD] : no jugular venous distention  [de-identified] : NAD, ambulatory [de-identified] : supple [FreeTextEntry1] : abdomen proximal [FreeTextEntry2] : 1 [FreeTextEntry3] : 1 [FreeTextEntry4] : 1.5 [de-identified] : hypergranulation tissue [de-identified] : iodoform [de-identified] : predebridement: 0.5 x 0.5 x1.\par 1 cm tunnel at 3 oclock [FreeTextEntry8] : 1 [FreeTextEntry7] : abdomen distal [FreeTextEntry9] : 1 [de-identified] : 1.5 [de-identified] : hypergranulation tissue [de-identified] : iodoform [de-identified] : predebridement: 0.5 x 0.5 x 1.5cm

## 2019-07-27 ENCOUNTER — TRANSCRIPTION ENCOUNTER (OUTPATIENT)
Age: 74
End: 2019-07-27

## 2019-08-08 ENCOUNTER — APPOINTMENT (OUTPATIENT)
Dept: WOUND CARE | Facility: CLINIC | Age: 74
End: 2019-08-08
Payer: MEDICARE

## 2019-08-08 VITALS — WEIGHT: 140 LBS | BODY MASS INDEX: 20.09 KG/M2

## 2019-08-08 DIAGNOSIS — Z12.11 ENCOUNTER FOR SCREENING FOR MALIGNANT NEOPLASM OF COLON: ICD-10-CM

## 2019-08-08 PROCEDURE — 99213 OFFICE O/P EST LOW 20 MIN: CPT

## 2019-08-08 NOTE — ASSESSMENT
[Written] : Written [Dressing changes] : dressing changes [Stable] : stable [Home] : Home [Ambulatory] : Ambulatory [Verbal] : Verbal [Patient] : Patient [Good - alert, interested, motivated] : Good - alert, interested, motivated [Verbalizes knowledge/Understanding] : Verbalizes knowledge/understanding [Skin Care] : skin care [Signs and symptoms of infection] : sign and symptoms of infection [Pressure relief] : pressure relief [Nutrition] : nutrition [How and When to Call] : how and when to call [Patient responsibility to plan of care] : patient responsibility to plan of care [] : Yes [FreeTextEntry1] : The patient presents with recurrent nonhealing wounds to the abdomen.  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  F/u with ID  lowest wound with foul smelling pus, culture obtained, cleansed with dakins, packing placed\par possible CT scan if no improvement\par No clinical sign of infection\par Recommendation:\par Apply ----Aquacel and medihoney to proximal and middle wound; iodoform to distal wound\par Change dressing ---daily\par \par -----Wound supplies ordered via [email or web address]\par Patient given contact information\par \par -----Homecare declined\par \par Follow up appointment scheduled for 1-2 weeks\par \par

## 2019-08-08 NOTE — HISTORY OF PRESENT ILLNESS
[FreeTextEntry1] : Mr. MILAD CRUZ   presents to the office with a wound since February 2019.  The wound is located on  the abdominal incisional site- .  The patient has complaints of nonhealing wound.   The patient has been dressing the wound with gauze with bacitracin.  The patient denies fevers or chills.  The patient has localized pain to the wound upon dressing changes.  The patient has no other complaints or associated symptoms.  Denies diabetes\par \par history of total gastrectomy esophagojejunal anastomosis for gastric cancer June 2014., no recurrence. s/p herniation with hernia repair w/o mesh as per patient.  s/p lap cholecystectomy (July 2018)  Patient had recurrence of hiatal hernia with subsequent bowel rupture of the R Bree.  s/p reop for anastamotic leak complicated with esophagitis and TPN for several months (Feb 2019).  \par \par Patient likes to go to [place] to [employer].

## 2019-08-08 NOTE — PHYSICAL EXAM
[Normal Breath Sounds] : Normal breath sounds [Normal Rate and Rhythm] : normal rate and rhythm [2+] : left 2+ [Skin Ulcer] : ulcer [Alert] : alert [Oriented to Person] : oriented to person [Oriented to Place] : oriented to place [Oriented to Time] : oriented to time [Calm] : calm [JVD] : no jugular venous distention  [Ankle Swelling (On Exam)] : not present [de-identified] : NAD, ambulatory [de-identified] : AT  [de-identified] : supple [de-identified] : non-enteric drainage- patient says it smells, pus coming from lowest most inferior wound [de-identified] :  normal [de-identified] : rom intact [FreeTextEntry1] : abdomen (clusterof 3 wounds ) [FreeTextEntry2] : 9 [FreeTextEntry3] : 0.3 [FreeTextEntry4] : 0.1 [de-identified] : 1 cm tunnel at 3 oclock\par 1 cm tunnel at 3 oclock\par  [de-identified] : hypergranulation tissue [FreeTextEntry5] : curette [de-identified] : Skin and subcutaneous tissue [de-identified] : iodoform [de-identified] : cluster of 3 of 3 wound \par \par Promixal and middle :0.5cm x 0.3cmx 0.1cm\par \par Distal wound:\par 0.5cm x 0.3cm x 1.2cm\par Undermining: @3 oclock 1cm\par \par  [de-identified] : hypergranulation tissue

## 2019-08-13 ENCOUNTER — RX CHANGE (OUTPATIENT)
Age: 74
End: 2019-08-13

## 2019-08-23 LAB — BACTERIA SPEC CULT: ABNORMAL

## 2019-08-30 ENCOUNTER — APPOINTMENT (OUTPATIENT)
Dept: WOUND CARE | Facility: CLINIC | Age: 74
End: 2019-08-30
Payer: MEDICARE

## 2019-08-30 DIAGNOSIS — Z22.322 CARRIER OR SUSPECTED CARRIER OF METHICILLIN RESISTANT STAPHYLOCOCCUS AUREUS: ICD-10-CM

## 2019-08-30 DIAGNOSIS — Z86.79 PERSONAL HISTORY OF OTHER DISEASES OF THE CIRCULATORY SYSTEM: ICD-10-CM

## 2019-08-30 PROCEDURE — 99213 OFFICE O/P EST LOW 20 MIN: CPT

## 2019-08-30 NOTE — REVIEW OF SYSTEMS
[As Noted in HPI] : as noted in HPI [Skin Wound] : skin wound [Skin Lesions] : skin lesion [Negative] : Heme/Lymph

## 2019-09-10 NOTE — ASSESSMENT
[Written] : Written [Verbal] : Verbal [Patient] : Patient [Good - alert, interested, motivated] : Good - alert, interested, motivated [Verbalizes knowledge/Understanding] : Verbalizes knowledge/understanding [Dressing changes] : dressing changes [Skin Care] : skin care [Pressure relief] : pressure relief [Signs and symptoms of infection] : sign and symptoms of infection [Nutrition] : nutrition [How and When to Call] : how and when to call [Patient responsibility to plan of care] : patient responsibility to plan of care [] : Yes [Stable] : stable [Home] : Home [Ambulatory] : Ambulatory [FreeTextEntry1] :  74 yr old with surgical recurrent nonhealing wounds to the abdomen. 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

## 2019-09-10 NOTE — DISCUSSION/SUMMARY
[FreeTextEntry1] : placed on cipro and gentamicin topical, script placed to pharmacy, s/w pt., understood directions

## 2019-09-10 NOTE — PHYSICAL EXAM
[Normal Breath Sounds] : Normal breath sounds [Normal Rate and Rhythm] : normal rate and rhythm [2+] : left 2+ [Skin Ulcer] : ulcer [Alert] : alert [Oriented to Person] : oriented to person [Oriented to Place] : oriented to place [Oriented to Time] : oriented to time [Calm] : calm [JVD] : no jugular venous distention  [de-identified] : AT  [Ankle Swelling (On Exam)] : not present [de-identified] : NAD, ambulatory [de-identified] : supple [de-identified] : non-enteric drainage- patient says it smells, pus coming from lowest most inferior wound [de-identified] :  normal [de-identified] : rom intact [FreeTextEntry1] : abdomen (clusterof 2 wounds ) [FreeTextEntry2] : 5 [FreeTextEntry3] : .2 [de-identified] : fistula communicate with saline [de-identified] : 1 cm tunnel at 3 oclock\par 1 cm tunnel at 3 oclock\par  [FreeTextEntry4] : 0 [de-identified] : hypergranulation tissue [FreeTextEntry5] : curette [de-identified] : iodoform [de-identified] : Skin and subcutaneous tissue [de-identified] : 9/0.3/0.1\par cluster of 3 of 3 wound \par \par Promixal and middle :0.5cm x 0.3cmx 0.1cm\par \par Distal wound:\par 0.5cm x 0.3cm x 1.2cm\par Undermining: @3 oclock 1cm\par \par  [de-identified] : hypergranulation tissue [TWNoteComboBox3] : FT [TWNoteComboBox1] : Midline [TWNoteComboBox5] : Yes [TWNoteComboBox4] : Moderate [de-identified] : Normal [de-identified] : None [de-identified] : Mild [TWNoteComboBox7] : Maulik [de-identified] : Debridement performed of all devitalized tissue to bleeding viable tissue [de-identified] : >75% [de-identified] : FT [de-identified] : Debridement performed of all devitalized tissue to bleeding viable tissue [TWNoteComboBox8] : Maulik [de-identified] : 1% Lidocaine Injection

## 2019-09-26 ENCOUNTER — APPOINTMENT (OUTPATIENT)
Dept: WOUND CARE | Facility: CLINIC | Age: 74
End: 2019-09-26
Payer: MEDICARE

## 2019-09-26 VITALS — DIASTOLIC BLOOD PRESSURE: 80 MMHG | HEART RATE: 72 BPM | SYSTOLIC BLOOD PRESSURE: 127 MMHG | TEMPERATURE: 98.1 F

## 2019-09-26 PROCEDURE — 99213 OFFICE O/P EST LOW 20 MIN: CPT

## 2019-10-10 NOTE — HISTORY OF PRESENT ILLNESS
[FreeTextEntry1] : Mr. MILAD CRUZ   presents to the office with a wound since February 2019.  seen last month/foul smelling pus, culture obtained aug mrsa and proteus\par  cleansed with dakins, iodoform packing placed- fistula between wounds, irrigated\par The wound is located on  the abdominal incisional site- .  The patient has complaints of nonhealing wound.   The patient has been dressing the wound with gauze with bacitracin.  The patient denies fevers or chills.  The patient has localized pain to the wound upon dressing changes.  The patient has no other complaints or associated symptoms.  Denies diabetes\par \par history of total gastrectomy esophagojejunal anastomosis for gastric cancer June 2014., no recurrence. s/p herniation with hernia repair w/o mesh as per patient.  s/p lap cholecystectomy (July 2018)  Patient had recurrence of hiatal hernia with subsequent bowel rupture of the R Bree.  s/p reop for anastamotic leak complicated with esophagitis and TPN for several months (Feb 2019).  \par \par Patient likes to go to [place] to ski.

## 2019-10-10 NOTE — PHYSICAL EXAM
[Normal Breath Sounds] : Normal breath sounds [Normal Rate and Rhythm] : normal rate and rhythm [2+] : left 2+ [Skin Ulcer] : ulcer [Alert] : alert [Oriented to Person] : oriented to person [Oriented to Place] : oriented to place [Oriented to Time] : oriented to time [Calm] : calm [Ankle Swelling (On Exam)] : not present [JVD] : no jugular venous distention  [de-identified] : NAD, ambulatory [de-identified] : AT  [de-identified] :  normal [de-identified] : supple [de-identified] : non-enteric drainage- patient says it smells, pus coming from lowest most inferior wound [de-identified] : rom intact [FreeTextEntry3] : .2 [FreeTextEntry2] : 5 [FreeTextEntry1] : abdomen (clusterof 2 wounds ) [de-identified] : 1 cm tunnel at 3 oclock\par 1 cm tunnel at 3 oclock\par  [de-identified] : hypergranulation tissue [FreeTextEntry5] : curette [de-identified] : fistula communicate with saline [de-identified] : 9/0.3/0.1\par cluster of 3 of 3 wound \par \par Promixal and middle :0.5cm x 0.3cmx 0.1cm\par \par Distal wound:\par 0.5cm x 0.3cm x 1.2cm\par Undermining: @3 oclock 1cm\par \par  [de-identified] : Skin and subcutaneous tissue [de-identified] : iodoform [TWNoteComboBox3] : FT [TWNoteComboBox1] : Midline [de-identified] : hypergranulation tissue [TWNoteComboBox5] : Yes [TWNoteComboBox4] : Moderate [de-identified] : Normal [de-identified] : Mild [TWNoteComboBox7] : Maulik [de-identified] : Debridement performed of all devitalized tissue to bleeding viable tissue [de-identified] : None [de-identified] : FT [de-identified] : >75% [de-identified] : 1% Lidocaine Injection [TWNoteComboBox8] : Maulik [de-identified] : Debridement performed of all devitalized tissue to bleeding viable tissue

## 2019-10-10 NOTE — ASSESSMENT
[Written] : Written [Verbal] : Verbal [Patient] : Patient [Good - alert, interested, motivated] : Good - alert, interested, motivated [Dressing changes] : dressing changes [Verbalizes knowledge/Understanding] : Verbalizes knowledge/understanding [Skin Care] : skin care [Pressure relief] : pressure relief [Signs and symptoms of infection] : sign and symptoms of infection [Nutrition] : nutrition [How and When to Call] : how and when to call [Patient responsibility to plan of care] : patient responsibility to plan of care [] : Yes [Stable] : stable [Home] : Home [Ambulatory] : Ambulatory [FreeTextEntry1] :  74 yr old with surgical recurrent nonhealing wounds to the abdomen. 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.\par   F/u with ID  lowest wound with foul smelling pus, culture obtained aug mrsa and proteus\par  cleansed with dakins, iodoform packing placed- fistula between wounds, irrigated\par possible CT scan if no improvement\par No clinical sign of infection\par Recommendation:\par Apply ----Aquacel and medihoney to proximal and middle wound; iodoform to distal wound\par Change dressing ---daily\par -----Wound supplies ordered via [employer]\par Patient given contact information\par -----Homecare declined\par complex low-lab, xr annette,test reviewed\par risk-low tolerated irrigation well\par Follow up appointment scheduled for 1-2 weeks\par \par

## 2019-11-26 ENCOUNTER — APPOINTMENT (OUTPATIENT)
Dept: WOUND CARE | Facility: CLINIC | Age: 74
End: 2019-11-26
Payer: MEDICARE

## 2019-11-26 DIAGNOSIS — K56.609 UNSPECIFIED INTESTINAL OBSTRUCTION, UNSPECIFIED AS TO PARTIAL VERSUS COMPLETE OBSTRUCTION: ICD-10-CM

## 2019-11-26 PROCEDURE — 99214 OFFICE O/P EST MOD 30 MIN: CPT | Mod: 25

## 2019-11-26 PROCEDURE — 11042 DBRDMT SUBQ TIS 1ST 20SQCM/<: CPT

## 2019-11-26 NOTE — REVIEW OF SYSTEMS
[Skin Wound] : skin wound [Skin Lesions] : skin lesion [As Noted in HPI] : as noted in HPI [Negative] : Heme/Lymph

## 2019-12-06 PROBLEM — K56.609 SMALL BOWEL OBSTRUCTION: Status: ACTIVE | Noted: 2018-09-21

## 2019-12-24 NOTE — PHYSICAL EXAM
[Normal Rate and Rhythm] : normal rate and rhythm [Normal Breath Sounds] : Normal breath sounds [2+] : left 2+ [Alert] : alert [Skin Ulcer] : ulcer [Oriented to Person] : oriented to person [Oriented to Place] : oriented to place [Oriented to Time] : oriented to time [Calm] : calm [4 x 4] : 4 x 4  [JVD] : no jugular venous distention  [Ankle Swelling (On Exam)] : not present [de-identified] : NAD, ambulatory [de-identified] : AT  [de-identified] : non-enteric drainage- patient says it smells, pus coming from lowest most inferior wound [de-identified] : supple [de-identified] :  normal [de-identified] : rom intact [FreeTextEntry1] : abdomen (cluster of 3 wounds ) [de-identified] : pt tolerated silver nitrate. [FreeTextEntry2] : 4.3cm [FreeTextEntry3] : .7cm [FreeTextEntry4] : .7cm [de-identified] : fistula communicate with saline [de-identified] : 1 cm tunnel at 3 o'clock\par 1 cm tunnel at 3 oclock\par  [de-identified] : hypergranulation tissue [FreeTextEntry5] : scissors/ forcepts [de-identified] : Skin and subcutaneous tissue [de-identified] : iodoform 1/4 packing [de-identified] : 5/.2/0.1\par \par \par Promixal and middle :0.5cm x 0.3cmx 0.1cm\par \par Distal wound:\par 0.5cm x 0.3cm x 1.2cm\par Undermining: @3 oclock 1cm\par \par  [de-identified] : hypergranulation tissue [TWNoteComboBox1] : Midline [TWNoteComboBox3] : FT [TWNoteComboBox6] : Surgical [TWNoteComboBox4] : Moderate [TWNoteComboBox5] : Yes [de-identified] : Yes [de-identified] : Normal [de-identified] : Mild [de-identified] : None [de-identified] : Yes [de-identified] : 100% [de-identified] : 2.5% Lidocaine Topical [TWNoteComboBox7] : Mualik [de-identified] : Debridement performed of all devitalized tissue to bleeding viable tissue [de-identified] : Primary Dressing [de-identified] : Daily [de-identified] : FT [TWNoteComboBox8] : Maulik [de-identified] : 1% Lidocaine Injection [de-identified] : >75% [de-identified] : Debridement performed of all devitalized tissue to bleeding viable tissue

## 2019-12-24 NOTE — ASSESSMENT
[Written] : Written [Good - alert, interested, motivated] : Good - alert, interested, motivated [Verbal] : Verbal [Patient] : Patient [Dressing changes] : dressing changes [Verbalizes knowledge/Understanding] : Verbalizes knowledge/understanding [Skin Care] : skin care [Pressure relief] : pressure relief [Signs and symptoms of infection] : sign and symptoms of infection [How and When to Call] : how and when to call [Nutrition] : nutrition [Patient responsibility to plan of care] : patient responsibility to plan of care [] : Yes [Stable] : stable [Home] : Home [Ambulatory] : Ambulatory [FreeTextEntry1] :  74 yr old with surgical recurrent nonhealing wounds to the abdomen. 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.\par   F/u with ID  lowest wound with foul smelling pus, culture obtained aug mrsa and proteus\par  cleansed with dakins, iodoform packing placed- fistula between wounds, irrigated\par possible CT scan if no improvement\par No clinical sign of infection\par Recommendation:\par Apply ----Aquacel and Medihoney to proximal and middle wound; iodoform to distal wound\par Change dressing ---daily\par -----Wound supplies ordered via [employer]\par Patient given contact information\par -----Homecare declined\par complex low-lab, xr annette,test reviewed\par risk-low tolerated irrigation well\par Follow up appointment scheduled for 1-2 weeks\par \par

## 2020-01-02 ENCOUNTER — APPOINTMENT (OUTPATIENT)
Dept: WOUND CARE | Facility: CLINIC | Age: 75
End: 2020-01-02
Payer: MEDICARE

## 2020-01-02 VITALS
RESPIRATION RATE: 18 BRPM | SYSTOLIC BLOOD PRESSURE: 147 MMHG | TEMPERATURE: 97.8 F | DIASTOLIC BLOOD PRESSURE: 88 MMHG | HEART RATE: 63 BPM

## 2020-01-02 DIAGNOSIS — S31.109A UNSPECIFIED OPEN WOUND OF ABDOMINAL WALL, UNSPECIFIED QUADRANT W/OUT PENETRATION INTO PERITONEAL CAVITY, INITIAL ENCOUNTER: ICD-10-CM

## 2020-01-02 DIAGNOSIS — Z87.891 PERSONAL HISTORY OF NICOTINE DEPENDENCE: ICD-10-CM

## 2020-01-02 DIAGNOSIS — I47.1 SUPRAVENTRICULAR TACHYCARDIA: ICD-10-CM

## 2020-01-02 PROCEDURE — 99213 OFFICE O/P EST LOW 20 MIN: CPT | Mod: 25

## 2020-01-02 PROCEDURE — 17250 CHEM CAUT OF GRANLTJ TISSUE: CPT

## 2020-01-03 PROBLEM — S31.109A OPEN WOUND OF ANTERIOR ABDOMINAL WALL, INITIAL ENCOUNTER: Status: ACTIVE | Noted: 2019-07-26

## 2020-01-03 PROBLEM — I47.1 SVT (SUPRAVENTRICULAR TACHYCARDIA): Status: ACTIVE | Noted: 2018-04-04

## 2020-01-03 NOTE — PHYSICAL EXAM
[Normal Breath Sounds] : Normal breath sounds [Normal Rate and Rhythm] : normal rate and rhythm [2+] : left 2+ [Skin Ulcer] : ulcer [Alert] : alert [Oriented to Person] : oriented to person [Oriented to Place] : oriented to place [Oriented to Time] : oriented to time [Calm] : calm [4 x 4] : 4 x 4  [JVD] : no jugular venous distention  [Ankle Swelling (On Exam)] : not present [de-identified] : NAD, ambulatory [de-identified] : AT  [de-identified] : supple [de-identified] : non-enteric drainage-no smells, pus coming from lowest most inferior wound [de-identified] :  normal [de-identified] : rom intact [de-identified] : pt tolerated silver nitrate. [FreeTextEntry1] : abdomen (cluster of 3 wounds ) [FreeTextEntry2] : 3.2 [FreeTextEntry3] : 1 [FreeTextEntry4] : 0.7 [de-identified] : proximal & distal communicate [de-identified] : 1 cm tunnel at 3 o'clock\par 1 cm tunnel at 3 oclock\par  [de-identified] : hypergranulation tissue [FreeTextEntry5] :   silver nitrate [de-identified] : excess tissue [de-identified] : previously 4.3/0.7/0.7\par \par  \par \par Distal wound:\par 0.5cm x 0.5cm x 1.cm\par Undermining: @3 oclock 1cm\par \par  [de-identified] : silver nitrate applied to all three drawtex [de-identified] : hypergranulation tissue [TWNoteComboBox1] : Midline [TWNoteComboBox3] : FT [TWNoteComboBox4] : Moderate [TWNoteComboBox5] : Yes [TWNoteComboBox6] : Surgical [de-identified] : Normal [de-identified] : Mild [de-identified] : None [de-identified] : 100% [TWNoteComboBox7] : Maulik [de-identified] : Yes [de-identified] : Debridement performed of all devitalized tissue to bleeding viable tissue [de-identified] : FT [de-identified] : Daily [de-identified] : >75% [TWNoteComboBox8] : Maulik [de-identified] : 1% Lidocaine Injection [de-identified] : Debridement performed of all devitalized tissue to bleeding viable tissue

## 2020-01-03 NOTE — HISTORY OF PRESENT ILLNESS
[FreeTextEntry1] : Mr. STINSON ROLL    with a wound since February 2019. went to see surgeon about possible mesh and had a ct shows air and fluid at wound\par did not want to do surgery now, wants to go to [place] until sppring\par he has md and wound care in [place]\par does his own dressings daily. \par  seen last month/foul smelling pus, culture obtained aug mrsa and proteus\par  cleansed with dakins, used alginate\par  iodoform packing placed- fistula between wounds, irrigated\par The wound is located on  the abdominal incisional site- .\par   The patient has complaints of nonhealing wound.   \par   The patient denies fevers or chills.  The patient has localized pain to the wound upon dressing changes.  The patient has no other complaints or associated symptoms.  Denies diabetes\par \par history of total gastrectomy esophagojejunal anastomosis for gastric cancer June 2014., no recurrence. s/p herniation with hernia repair w/o mesh as per patient.  s/p lap cholecystectomy (July 2018)  Patient had recurrence of hiatal hernia with subsequent bowel rupture of the R Bree.  s/p reop for anastamotic leak complicated with esophagitis and TPN for several months (Feb 2019).  \par \par Patient likes to go to [place] to ski.

## 2020-01-03 NOTE — ASSESSMENT
[Verbal] : Verbal [Written] : Written [Patient] : Patient [Good - alert, interested, motivated] : Good - alert, interested, motivated [Dressing changes] : dressing changes [Verbalizes knowledge/Understanding] : Verbalizes knowledge/understanding [Signs and symptoms of infection] : sign and symptoms of infection [Pressure relief] : pressure relief [Skin Care] : skin care [Nutrition] : nutrition [How and When to Call] : how and when to call [Patient responsibility to plan of care] : patient responsibility to plan of care [Stable] : stable [] : Yes [Ambulatory] : Ambulatory [Home] : Home [FreeTextEntry1] :  74 yr old with surgical recurrent nonhealing wounds to the abdomen\par  h/o prior gastrectomy malignancy resected, bariatric surgery, esophagogastric ananstamosis,\par  diaphragm hernia/ cmv esophagitis s/p antivirals  may 19, svt,\par   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 does own wound care, ag nitrate applied to all three hyper granular wounds\par  packing distal with drawtex, proximal and middle being treated with drawtex.\par Pt. brought recent CT scan of abdomen/pelvis reviewed.\par To follow up with surgeon for scar revision

## 2020-01-03 NOTE — PLAN
[FreeTextEntry1] : 1/2/20\par Plan - c/w drawtex packing to distal and drawtex squares to proximal and middle. Pt. going to [place], is deferring any surgery at present with Dr. Venegas, discussed surgical options with him.\par Script placed for gentamicin ointment

## 2020-02-12 ENCOUNTER — FORM ENCOUNTER (OUTPATIENT)
Age: 75
End: 2020-02-12

## 2020-02-13 ENCOUNTER — APPOINTMENT (OUTPATIENT)
Dept: CT IMAGING | Facility: IMAGING CENTER | Age: 75
End: 2020-02-13
Payer: MEDICARE

## 2020-02-13 ENCOUNTER — OUTPATIENT (OUTPATIENT)
Dept: OUTPATIENT SERVICES | Facility: HOSPITAL | Age: 75
LOS: 1 days | End: 2020-02-13
Payer: MEDICARE

## 2020-02-13 DIAGNOSIS — Z90.5 ACQUIRED ABSENCE OF KIDNEY: Chronic | ICD-10-CM

## 2020-02-13 DIAGNOSIS — Z98.890 OTHER SPECIFIED POSTPROCEDURAL STATES: Chronic | ICD-10-CM

## 2020-02-13 DIAGNOSIS — Z90.3 ACQUIRED ABSENCE OF STOMACH [PART OF]: Chronic | ICD-10-CM

## 2020-02-13 DIAGNOSIS — Z90.49 ACQUIRED ABSENCE OF OTHER SPECIFIED PARTS OF DIGESTIVE TRACT: Chronic | ICD-10-CM

## 2020-02-13 PROCEDURE — 74176 CT ABD & PELVIS W/O CONTRAST: CPT

## 2020-02-13 PROCEDURE — 74176 CT ABD & PELVIS W/O CONTRAST: CPT | Mod: 26

## 2020-02-19 ENCOUNTER — FORM ENCOUNTER (OUTPATIENT)
Age: 75
End: 2020-02-19

## 2020-02-20 ENCOUNTER — OUTPATIENT (OUTPATIENT)
Dept: OUTPATIENT SERVICES | Facility: HOSPITAL | Age: 75
LOS: 1 days | End: 2020-02-20

## 2020-02-20 ENCOUNTER — APPOINTMENT (OUTPATIENT)
Dept: INTERVENTIONAL RADIOLOGY/VASCULAR | Facility: CLINIC | Age: 75
End: 2020-02-20
Payer: MEDICARE

## 2020-02-20 ENCOUNTER — APPOINTMENT (OUTPATIENT)
Dept: CT IMAGING | Facility: HOSPITAL | Age: 75
End: 2020-02-20
Payer: MEDICARE

## 2020-02-20 VITALS
BODY MASS INDEX: 20.44 KG/M2 | HEIGHT: 69 IN | DIASTOLIC BLOOD PRESSURE: 61 MMHG | WEIGHT: 138 LBS | SYSTOLIC BLOOD PRESSURE: 106 MMHG | OXYGEN SATURATION: 99 % | RESPIRATION RATE: 18 BRPM | HEART RATE: 70 BPM

## 2020-02-20 DIAGNOSIS — Z80.3 FAMILY HISTORY OF MALIGNANT NEOPLASM OF BREAST: ICD-10-CM

## 2020-02-20 DIAGNOSIS — Z90.3 ACQUIRED ABSENCE OF STOMACH [PART OF]: Chronic | ICD-10-CM

## 2020-02-20 DIAGNOSIS — Z98.890 OTHER SPECIFIED POSTPROCEDURAL STATES: Chronic | ICD-10-CM

## 2020-02-20 DIAGNOSIS — Z90.49 ACQUIRED ABSENCE OF OTHER SPECIFIED PARTS OF DIGESTIVE TRACT: Chronic | ICD-10-CM

## 2020-02-20 DIAGNOSIS — C16.9 MALIGNANT NEOPLASM OF STOMACH, UNSPECIFIED: ICD-10-CM

## 2020-02-20 DIAGNOSIS — R11.10 VOMITING, UNSPECIFIED: ICD-10-CM

## 2020-02-20 DIAGNOSIS — T81.30XA DISRUPTION OF WOUND, UNSPECIFIED, INITIAL ENCOUNTER: ICD-10-CM

## 2020-02-20 DIAGNOSIS — Z90.5 ACQUIRED ABSENCE OF KIDNEY: Chronic | ICD-10-CM

## 2020-02-20 PROCEDURE — 99204 OFFICE O/P NEW MOD 45 MIN: CPT

## 2020-02-20 PROCEDURE — 74177 CT ABD & PELVIS W/CONTRAST: CPT | Mod: 26

## 2020-02-20 RX ORDER — GENTAMICIN SULFATE 1 MG/G
0.1 OINTMENT TOPICAL DAILY
Qty: 1 | Refills: 2 | Status: COMPLETED | COMMUNITY
Start: 2019-08-13 | End: 2020-02-20

## 2020-02-20 RX ORDER — CIPROFLOXACIN HYDROCHLORIDE 500 MG/1
500 TABLET, FILM COATED ORAL TWICE DAILY
Qty: 14 | Refills: 0 | Status: COMPLETED | COMMUNITY
Start: 2019-08-13 | End: 2020-02-20

## 2020-02-20 RX ORDER — MINOCYCLINE HYDROCHLORIDE 100 MG/1
100 CAPSULE ORAL TWICE DAILY
Qty: 20 | Refills: 0 | Status: COMPLETED | COMMUNITY
Start: 2020-01-02 | End: 2020-02-20

## 2020-02-20 RX ORDER — MULTIVITAMIN
TABLET ORAL
Refills: 0 | Status: COMPLETED | COMMUNITY
End: 2020-02-20

## 2020-02-20 RX ORDER — METOPROLOL SUCCINATE 25 MG/1
25 TABLET, EXTENDED RELEASE ORAL DAILY
Refills: 0 | Status: COMPLETED | COMMUNITY
End: 2020-02-20

## 2020-02-20 RX ORDER — TACROLIMUS 1 MG/G
0.1 OINTMENT TOPICAL TWICE DAILY
Qty: 1 | Refills: 0 | Status: COMPLETED | COMMUNITY
Start: 2019-11-26 | End: 2020-02-20

## 2020-02-20 RX ORDER — GENTAMICIN SULFATE 1 MG/G
0.1 OINTMENT TOPICAL DAILY
Qty: 1 | Refills: 2 | Status: COMPLETED | COMMUNITY
Start: 2020-01-02 | End: 2020-02-20

## 2020-02-20 RX ORDER — CIPROFLOXACIN HYDROCHLORIDE 500 MG/1
500 TABLET, FILM COATED ORAL
Qty: 14 | Refills: 0 | Status: COMPLETED | COMMUNITY
Start: 2019-08-13 | End: 2020-02-20

## 2020-02-20 RX ORDER — MUPIROCIN 20 MG/G
2 OINTMENT TOPICAL
Qty: 1 | Refills: 1 | Status: COMPLETED | COMMUNITY
Start: 2019-09-26 | End: 2020-02-20

## 2020-02-20 NOTE — REVIEW OF SYSTEMS
[Loss Of Hearing] : hearing loss [Fever] : no fever [Chills] : no chills [Eyesight Problems] : no eyesight problems [Chest Pain] : no chest pain [Palpitations] : no palpitations [Shortness Of Breath] : no shortness of breath [Easy Bleeding] : no tendency for easy bleeding [Easy Bruising] : no tendency for easy bruising [FreeTextEntry4] : R ear hearing  loss

## 2020-02-20 NOTE — CONSULT LETTER
[Dear  ___] : Dear  [unfilled], [Consult Letter:] : I had the pleasure of evaluating your patient, [unfilled]. [Please see my note below.] : Please see my note below. [Sincerely,] : Sincerely, [Consult Closing:] : Thank you very much for allowing me to participate in the care of this patient.  If you have any questions, please do not hesitate to contact me. [FreeTextEntry2] : Dr. Gamal Venegas

## 2020-02-20 NOTE — HISTORY OF PRESENT ILLNESS
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  GERD, HLD, HTN [place] ( [place] [facility] [place]  SBO and SVT (s/p ablation .  pt had recurrent hital hernia with subsequent bowel rupture had it repaired in [place] on 2/24/19.  Then had second surgery for intestinal rupture in [place] on 3/4/19.  pt continued to experience vomiting and was unable to ingest any food was diagnosed with CMV bases esophagitis was treated with Acydlovier. He was started on TPN and he followed up at [place] He had upper GI series done in May 2019 and TPN was discontinued. \par \par pt had endoscopy and upper GI series  done with Dr. Oswald and pt was seen by Dr. Venegas in December 2019.  pt was admitted at [place] for dehydration and NJ tube placed via endoscopy by Obi Harding on 1/20/20\par  pt reports the J tube was recently blocked and was seen by Dr. Venegas. pt also has open abdominal wound since last abdominal surgery about a year ago  and sees wound care. pt is able to tolerate liquids diet and was referred to IR to discuss possible placement of  percutaneous jejunostomy tube.pt had CT scan with contrast done today and  presents to  office for consultation with his wife\par \par Denies any recent SOB, CP, fever, chills, n/v/d.\par appetite is getting better and able to eat little bit more now.

## 2020-02-21 ENCOUNTER — OUTPATIENT (OUTPATIENT)
Dept: OUTPATIENT SERVICES | Facility: HOSPITAL | Age: 75
LOS: 1 days | End: 2020-02-21
Payer: MEDICARE

## 2020-02-21 VITALS
HEART RATE: 76 BPM | OXYGEN SATURATION: 99 % | SYSTOLIC BLOOD PRESSURE: 100 MMHG | RESPIRATION RATE: 16 BRPM | DIASTOLIC BLOOD PRESSURE: 64 MMHG | HEIGHT: 68 IN | TEMPERATURE: 97 F | WEIGHT: 134.92 LBS

## 2020-02-21 DIAGNOSIS — Z98.890 OTHER SPECIFIED POSTPROCEDURAL STATES: Chronic | ICD-10-CM

## 2020-02-21 DIAGNOSIS — Z90.49 ACQUIRED ABSENCE OF OTHER SPECIFIED PARTS OF DIGESTIVE TRACT: Chronic | ICD-10-CM

## 2020-02-21 DIAGNOSIS — R13.10 DYSPHAGIA, UNSPECIFIED: ICD-10-CM

## 2020-02-21 DIAGNOSIS — Z90.5 ACQUIRED ABSENCE OF KIDNEY: Chronic | ICD-10-CM

## 2020-02-21 DIAGNOSIS — Z90.3 ACQUIRED ABSENCE OF STOMACH [PART OF]: Chronic | ICD-10-CM

## 2020-02-21 LAB
ANION GAP SERPL CALC-SCNC: 13 MMO/L — SIGNIFICANT CHANGE UP (ref 7–14)
APTT BLD: 28.5 SEC — SIGNIFICANT CHANGE UP (ref 27.5–36.3)
BUN SERPL-MCNC: 22 MG/DL — SIGNIFICANT CHANGE UP (ref 7–23)
CALCIUM SERPL-MCNC: 9.2 MG/DL — SIGNIFICANT CHANGE UP (ref 8.4–10.5)
CHLORIDE SERPL-SCNC: 101 MMOL/L — SIGNIFICANT CHANGE UP (ref 98–107)
CO2 SERPL-SCNC: 24 MMOL/L — SIGNIFICANT CHANGE UP (ref 22–31)
CREAT SERPL-MCNC: 0.75 MG/DL — SIGNIFICANT CHANGE UP (ref 0.5–1.3)
GLUCOSE SERPL-MCNC: 67 MG/DL — LOW (ref 70–99)
HCT VFR BLD CALC: 36.9 % — LOW (ref 39–50)
HGB BLD-MCNC: 12 G/DL — LOW (ref 13–17)
INR BLD: 1.03 — SIGNIFICANT CHANGE UP (ref 0.88–1.17)
MCHC RBC-ENTMCNC: 30.3 PG — SIGNIFICANT CHANGE UP (ref 27–34)
MCHC RBC-ENTMCNC: 32.5 % — SIGNIFICANT CHANGE UP (ref 32–36)
MCV RBC AUTO: 93.2 FL — SIGNIFICANT CHANGE UP (ref 80–100)
NRBC # FLD: 0 K/UL — SIGNIFICANT CHANGE UP (ref 0–0)
PLATELET # BLD AUTO: 227 K/UL — SIGNIFICANT CHANGE UP (ref 150–400)
PMV BLD: 10.6 FL — SIGNIFICANT CHANGE UP (ref 7–13)
POTASSIUM SERPL-MCNC: 5.2 MMOL/L — SIGNIFICANT CHANGE UP (ref 3.5–5.3)
POTASSIUM SERPL-SCNC: 5.2 MMOL/L — SIGNIFICANT CHANGE UP (ref 3.5–5.3)
PROTHROM AB SERPL-ACNC: 11.7 SEC — SIGNIFICANT CHANGE UP (ref 9.8–13.1)
RBC # BLD: 3.96 M/UL — LOW (ref 4.2–5.8)
RBC # FLD: 15.4 % — HIGH (ref 10.3–14.5)
SODIUM SERPL-SCNC: 138 MMOL/L — SIGNIFICANT CHANGE UP (ref 135–145)
WBC # BLD: 6.98 K/UL — SIGNIFICANT CHANGE UP (ref 3.8–10.5)
WBC # FLD AUTO: 6.98 K/UL — SIGNIFICANT CHANGE UP (ref 3.8–10.5)

## 2020-02-21 PROCEDURE — 93010 ELECTROCARDIOGRAM REPORT: CPT

## 2020-02-21 RX ORDER — PREGABALIN 225 MG/1
1 CAPSULE ORAL
Qty: 0 | Refills: 0 | DISCHARGE

## 2020-02-21 RX ORDER — METOPROLOL TARTRATE 50 MG
1 TABLET ORAL
Qty: 0 | Refills: 0 | DISCHARGE

## 2020-02-21 RX ORDER — MULTIVIT-MIN/FERROUS GLUCONATE 9 MG/15 ML
1 LIQUID (ML) ORAL
Qty: 0 | Refills: 0 | DISCHARGE

## 2020-02-21 RX ORDER — PREGABALIN 225 MG/1
2 CAPSULE ORAL
Qty: 0 | Refills: 0 | DISCHARGE

## 2020-02-21 NOTE — H&P PST ADULT - NSICDXPASTMEDICALHX_GEN_ALL_CORE_FT
PAST MEDICAL HISTORY:  Gastric cancer 2014    HTN (hypertension)     Pancreatitis > 5 yrs ago    Renal cell carcinoma, unspecified laterality right    SVT (supraventricular tachycardia) had ablation in 2018

## 2020-02-21 NOTE — H&P PST ADULT - ASSESSMENT
73 yo m with h/o anemia, SVT had ablation in 2018 ,  htn, gastric ca s/p gastrectomy 2014, renal cell carcinoma right s/p partial nephrectomy presents for preop eval to have Jejunostomy tube placement

## 2020-02-21 NOTE — H&P PST ADULT - NSICDXPROBLEM_GEN_ALL_CORE_FT
PROBLEM DIAGNOSES  Problem: Dysphagia  Assessment and Plan: PROBLEM DIAGNOSES  Problem: Dysphagia  Assessment and Plan: Pt scheduled for Jejunostomy tube placement on 2/25/20.   labs pending, EKG in chart.   Preop intructions provided to pt.

## 2020-02-21 NOTE — H&P PST ADULT - HISTORY OF PRESENT ILLNESS
75 yo m with h/o anemia, SVT had ablation in 2018 ,  htn, gastric ca s/p gastrectomy 2014, renal cell carcinoma right s/p partial nephrectomy. Reports several months of odynophagia as well as esophageal dysphagia, post prandial vomiting (10-20 minutes post po intake) and GERD. Pt had NG tube inserted Jan 20th. Pt presents for preop eval to have Jejunostomy tube placement

## 2020-02-21 NOTE — H&P PST ADULT - NSANTHOSAYNRD_GEN_A_CORE
never tested/No. JAQUELIN screening performed.  STOP BANG Legend: 0-2 = LOW Risk; 3-4 = INTERMEDIATE Risk; 5-8 = HIGH Risk

## 2020-02-21 NOTE — H&P PST ADULT - NSICDXPASTSURGICALHX_GEN_ALL_CORE_FT
PAST SURGICAL HISTORY:  H/O knee surgery right 2009 - meniscus    H/O partial nephrectomy 2014 - right    H/O shoulder surgery right yr 2000, left 2006    H/O umbilical hernia repair 2015, bilat inguina hernia 15 yrs ago    History of repair of hiatal hernia 2018    S/P ACL repair 1997    S/P cholecystectomy 2018    S/P gastrectomy 2014

## 2020-02-21 NOTE — H&P PST ADULT - GASTROINTESTINAL COMMENTS
h/o gastric cancer;  dysphagia, n/v till Jan 2020 and had NGT inserted NGT in place NGT in place; midabdominal dressing noted D&I

## 2020-02-25 ENCOUNTER — OUTPATIENT (OUTPATIENT)
Dept: OUTPATIENT SERVICES | Facility: HOSPITAL | Age: 75
LOS: 1 days | End: 2020-02-25
Payer: MEDICARE

## 2020-02-25 DIAGNOSIS — Z98.890 OTHER SPECIFIED POSTPROCEDURAL STATES: Chronic | ICD-10-CM

## 2020-02-25 DIAGNOSIS — Z90.3 ACQUIRED ABSENCE OF STOMACH [PART OF]: Chronic | ICD-10-CM

## 2020-02-25 DIAGNOSIS — K20.9 ESOPHAGITIS, UNSPECIFIED: ICD-10-CM

## 2020-02-25 DIAGNOSIS — Z90.49 ACQUIRED ABSENCE OF OTHER SPECIFIED PARTS OF DIGESTIVE TRACT: Chronic | ICD-10-CM

## 2020-02-25 DIAGNOSIS — Z90.5 ACQUIRED ABSENCE OF KIDNEY: Chronic | ICD-10-CM

## 2020-02-25 PROBLEM — K85.90 ACUTE PANCREATITIS WITHOUT NECROSIS OR INFECTION, UNSPECIFIED: Chronic | Status: ACTIVE | Noted: 2018-08-27

## 2020-02-25 PROBLEM — I47.1 SUPRAVENTRICULAR TACHYCARDIA: Chronic | Status: ACTIVE | Noted: 2018-08-27

## 2020-02-25 PROBLEM — C64.9 MALIGNANT NEOPLASM OF UNSPECIFIED KIDNEY, EXCEPT RENAL PELVIS: Chronic | Status: ACTIVE | Noted: 2018-08-27

## 2020-02-25 PROBLEM — C16.9 MALIGNANT NEOPLASM OF STOMACH, UNSPECIFIED: Chronic | Status: ACTIVE | Noted: 2020-02-21

## 2020-02-25 PROCEDURE — 76380 CAT SCAN FOLLOW-UP STUDY: CPT | Mod: 26

## 2020-02-25 PROCEDURE — 49441 PLACE DUOD/JEJ TUBE PERC: CPT

## 2020-03-02 DIAGNOSIS — R63.3 FEEDING DIFFICULTIES: ICD-10-CM

## 2020-03-06 DIAGNOSIS — D36.9 BENIGN NEOPLASM, UNSPECIFIED SITE: ICD-10-CM

## 2020-03-11 ENCOUNTER — APPOINTMENT (OUTPATIENT)
Dept: INTERVENTIONAL RADIOLOGY/VASCULAR | Facility: CLINIC | Age: 75
End: 2020-03-11
Payer: MEDICARE

## 2020-03-11 VITALS
RESPIRATION RATE: 18 BRPM | SYSTOLIC BLOOD PRESSURE: 113 MMHG | BODY MASS INDEX: 19.85 KG/M2 | HEART RATE: 70 BPM | WEIGHT: 134 LBS | OXYGEN SATURATION: 98 % | DIASTOLIC BLOOD PRESSURE: 70 MMHG | HEIGHT: 69 IN

## 2020-03-11 DIAGNOSIS — Z90.3 ACQUIRED ABSENCE OF STOMACH [PART OF]: ICD-10-CM

## 2020-03-11 PROCEDURE — 99214 OFFICE O/P EST MOD 30 MIN: CPT

## 2020-03-11 RX ORDER — SUCRALFATE 1 G/1
TABLET ORAL
Refills: 0 | Status: ACTIVE | COMMUNITY

## 2020-03-27 ENCOUNTER — OUTPATIENT (OUTPATIENT)
Dept: OUTPATIENT SERVICES | Facility: HOSPITAL | Age: 75
LOS: 1 days | End: 2020-03-27
Payer: MEDICARE

## 2020-03-27 DIAGNOSIS — Z98.890 OTHER SPECIFIED POSTPROCEDURAL STATES: Chronic | ICD-10-CM

## 2020-03-27 DIAGNOSIS — Z90.5 ACQUIRED ABSENCE OF KIDNEY: Chronic | ICD-10-CM

## 2020-03-27 DIAGNOSIS — Z85.028 PERSONAL HISTORY OF OTHER MALIGNANT NEOPLASM OF STOMACH: ICD-10-CM

## 2020-03-27 DIAGNOSIS — Z90.3 ACQUIRED ABSENCE OF STOMACH [PART OF]: Chronic | ICD-10-CM

## 2020-03-27 DIAGNOSIS — C16.9 MALIGNANT NEOPLASM OF STOMACH, UNSPECIFIED: ICD-10-CM

## 2020-03-27 DIAGNOSIS — Z90.49 ACQUIRED ABSENCE OF OTHER SPECIFIED PARTS OF DIGESTIVE TRACT: Chronic | ICD-10-CM

## 2020-03-27 PROCEDURE — 49451 REPLACE DUOD/JEJ TUBE PERC: CPT

## 2020-04-02 DIAGNOSIS — T85.528A DISPLACEMENT OF OTHER GASTROINTESTINAL PROSTHETIC DEVICES, IMPLANTS AND GRAFTS, INITIAL ENCOUNTER: ICD-10-CM

## 2020-05-06 ENCOUNTER — TRANSCRIPTION ENCOUNTER (OUTPATIENT)
Age: 75
End: 2020-05-06

## 2020-05-28 ENCOUNTER — OUTPATIENT (OUTPATIENT)
Dept: OUTPATIENT SERVICES | Facility: HOSPITAL | Age: 75
LOS: 1 days | End: 2020-05-28
Payer: MEDICARE

## 2020-05-28 DIAGNOSIS — Z90.3 ACQUIRED ABSENCE OF STOMACH [PART OF]: Chronic | ICD-10-CM

## 2020-05-28 DIAGNOSIS — Z98.890 OTHER SPECIFIED POSTPROCEDURAL STATES: Chronic | ICD-10-CM

## 2020-05-28 DIAGNOSIS — C16.0 MALIGNANT NEOPLASM OF CARDIA: ICD-10-CM

## 2020-05-28 DIAGNOSIS — Z90.5 ACQUIRED ABSENCE OF KIDNEY: Chronic | ICD-10-CM

## 2020-05-28 DIAGNOSIS — Z90.49 ACQUIRED ABSENCE OF OTHER SPECIFIED PARTS OF DIGESTIVE TRACT: Chronic | ICD-10-CM

## 2020-05-28 PROCEDURE — 49451 REPLACE DUOD/JEJ TUBE PERC: CPT

## 2020-06-01 DIAGNOSIS — Z46.59 ENCOUNTER FOR FITTING AND ADJUSTMENT OF OTHER GASTROINTESTINAL APPLIANCE AND DEVICE: ICD-10-CM

## 2020-06-15 DIAGNOSIS — A49.02 METHICILLIN RESISTANT STAPHYLOCOCCUS AUREUS INFECTION, UNSPECIFIED SITE: ICD-10-CM

## 2020-06-15 DIAGNOSIS — Z93.4 OTHER ARTIFICIAL OPENINGS OF GASTROINTESTINAL TRACT STATUS: ICD-10-CM

## 2020-06-15 DIAGNOSIS — T81.30XA DISRUPTION OF WOUND, UNSPECIFIED, INITIAL ENCOUNTER: ICD-10-CM

## 2020-08-07 NOTE — H&P ADULT - NSHPLABSRESULTS_GEN_ALL_CORE
10.8   6.42  )-----------( 203      ( 24 Jun 2019 13:08 )             35.0     06-24    140  |  106  |  21  ----------------------------<  103<H>  4.4   |  22  |  0.69    Ca    8.7      24 Jun 2019 13:08    TPro  7.2  /  Alb  3.7  /  TBili  0.6  /  DBili  x   /  AST  27  /  ALT  17  /  AlkPhos  85  06-24    CAPILLARY BLOOD GLUCOSE        PT/INR - ( 24 Jun 2019 12:33 )   PT: 11.7 SEC;   INR: 1.05          PTT - ( 24 Jun 2019 12:33 )  PTT:28.7 SEC                              10.8   6.42  )-----------( 203      ( 24 Jun 2019 13:08 )             35.0     06-24    140  |  106  |  21  ----------------------------<  103<H>  4.4   |  22  |  0.69    Ca    8.7      24 Jun 2019 13:08    TPro  7.2  /  Alb  3.7  /  TBili  0.6  /  DBili  x   /  AST  27  /  ALT  17  /  AlkPhos  85  06-24    CAPILLARY BLOOD GLUCOSE        PT/INR - ( 24 Jun 2019 12:33 )   PT: 11.7 SEC;   INR: 1.05          PTT - ( 24 Jun 2019 12:33 )  PTT:28.7 SEC    Vital Signs Last 24 Hrs  T(C): 36.6 (24 Jun 2019 16:00), Max: 37.1 (24 Jun 2019 10:30)  T(F): 97.8 (24 Jun 2019 16:00), Max: 98.8 (24 Jun 2019 10:30)  HR: 71 (24 Jun 2019 16:00) (71 - 77)  BP: 139/84 (24 Jun 2019 16:00) (138/88 - 141/92)  BP(mean): --  RR: 16 (24 Jun 2019 16:00) (16 - 16)  SpO2: 100% (24 Jun 2019 16:00) (100% - 100%)
H/O total knee replacement, bilateral    History of atrioventricular alba ablation  x2  S/P left knee surgery

## 2023-01-24 NOTE — H&P ADULT - NSICDXFAMILYHX_GEN_ALL_CORE_FT
FAMILY HISTORY:  No pertinent family history in first degree relatives Provider Procedure Text (A): After obtaining clear surgical margins the defect was repaired by another provider.

## 2023-09-01 ENCOUNTER — NON-APPOINTMENT (OUTPATIENT)
Age: 78
End: 2023-09-01

## 2024-01-02 NOTE — ASSESSMENT
[FreeTextEntry1] : 74 year old male s/p gastrectomy for gastric cancer, recurrent hiatal hernia with bowel rapture and bowel repair x 2 unable to eat. Patient is receiving NJ tubes feeding for last month. Patient has anterior abdominal wall fistula for 1 year. CT with IV and oral contrast performed today demonstrated proximal jejunal loop which potentially can be targeted for percutaneous jejunostomy tube placement.\par \par Patient is an appropriate candidate for percutaneous jejunostomy tube placement.\par \par The procedure, it's risks and benefits were discussed with the patient and his wife and informed consent obtained.\par \par  No

## 2024-09-12 ENCOUNTER — NON-APPOINTMENT (OUTPATIENT)
Age: 79
End: 2024-09-12

## 2024-09-13 ENCOUNTER — INPATIENT (INPATIENT)
Facility: HOSPITAL | Age: 79
LOS: 5 days | Discharge: HOME | DRG: 872 | End: 2024-09-19
Attending: HOSPITALIST | Admitting: STUDENT IN AN ORGANIZED HEALTH CARE EDUCATION/TRAINING PROGRAM
Payer: MEDICARE

## 2024-09-13 VITALS
OXYGEN SATURATION: 97 % | DIASTOLIC BLOOD PRESSURE: 70 MMHG | WEIGHT: 175.05 LBS | TEMPERATURE: 98 F | HEIGHT: 69 IN | RESPIRATION RATE: 18 BRPM | HEART RATE: 100 BPM | SYSTOLIC BLOOD PRESSURE: 108 MMHG

## 2024-09-13 DIAGNOSIS — Z98.890 OTHER SPECIFIED POSTPROCEDURAL STATES: Chronic | ICD-10-CM

## 2024-09-13 DIAGNOSIS — R50.9 FEVER, UNSPECIFIED: ICD-10-CM

## 2024-09-13 DIAGNOSIS — Z90.3 ACQUIRED ABSENCE OF STOMACH [PART OF]: Chronic | ICD-10-CM

## 2024-09-13 DIAGNOSIS — Z90.5 ACQUIRED ABSENCE OF KIDNEY: Chronic | ICD-10-CM

## 2024-09-13 DIAGNOSIS — Z90.49 ACQUIRED ABSENCE OF OTHER SPECIFIED PARTS OF DIGESTIVE TRACT: Chronic | ICD-10-CM

## 2024-09-13 LAB
ALBUMIN SERPL ELPH-MCNC: 2.8 G/DL — LOW (ref 3.3–5)
ALBUMIN SERPL ELPH-MCNC: 3.4 G/DL — SIGNIFICANT CHANGE UP (ref 3.3–5)
ALP SERPL-CCNC: 139 U/L — HIGH (ref 40–120)
ALP SERPL-CCNC: 157 U/L — HIGH (ref 40–120)
ALT FLD-CCNC: 124 U/L — HIGH (ref 10–45)
ALT FLD-CCNC: 152 U/L — HIGH (ref 10–45)
ANION GAP SERPL CALC-SCNC: 12 MMOL/L — SIGNIFICANT CHANGE UP (ref 5–17)
ANION GAP SERPL CALC-SCNC: 6 MMOL/L — SIGNIFICANT CHANGE UP (ref 5–17)
APPEARANCE UR: CLEAR — SIGNIFICANT CHANGE UP
APTT BLD: 28.6 SEC — SIGNIFICANT CHANGE UP (ref 24.5–35.6)
AST SERPL-CCNC: 215 U/L — HIGH (ref 10–40)
AST SERPL-CCNC: 270 U/L — HIGH (ref 10–40)
BACTERIA # UR AUTO: ABNORMAL /HPF
BASOPHILS # BLD AUTO: 0.01 K/UL — SIGNIFICANT CHANGE UP (ref 0–0.2)
BASOPHILS NFR BLD AUTO: 0.2 % — SIGNIFICANT CHANGE UP (ref 0–2)
BILIRUB DIRECT SERPL-MCNC: 0.6 MG/DL — HIGH (ref 0–0.3)
BILIRUB SERPL-MCNC: 1.7 MG/DL — HIGH (ref 0.2–1.2)
BILIRUB SERPL-MCNC: 2 MG/DL — HIGH (ref 0.2–1.2)
BILIRUB UR-MCNC: ABNORMAL
BUN SERPL-MCNC: 24 MG/DL — HIGH (ref 7–23)
BUN SERPL-MCNC: 24 MG/DL — HIGH (ref 7–23)
CALCIUM SERPL-MCNC: 7.9 MG/DL — LOW (ref 8.4–10.5)
CALCIUM SERPL-MCNC: 8.5 MG/DL — SIGNIFICANT CHANGE UP (ref 8.4–10.5)
CHLORIDE SERPL-SCNC: 100 MMOL/L — SIGNIFICANT CHANGE UP (ref 96–108)
CHLORIDE SERPL-SCNC: 103 MMOL/L — SIGNIFICANT CHANGE UP (ref 96–108)
CO2 SERPL-SCNC: 22 MMOL/L — SIGNIFICANT CHANGE UP (ref 22–31)
CO2 SERPL-SCNC: 23 MMOL/L — SIGNIFICANT CHANGE UP (ref 22–31)
COLOR SPEC: SIGNIFICANT CHANGE UP
COMMENT - URINE: SIGNIFICANT CHANGE UP
CREAT SERPL-MCNC: 1.3 MG/DL — SIGNIFICANT CHANGE UP (ref 0.5–1.3)
CREAT SERPL-MCNC: 1.34 MG/DL — HIGH (ref 0.5–1.3)
DIFF PNL FLD: NEGATIVE — SIGNIFICANT CHANGE UP
EGFR: 53 ML/MIN/1.73M2 — LOW
EGFR: 55 ML/MIN/1.73M2 — LOW
EOSINOPHIL # BLD AUTO: 0 K/UL — SIGNIFICANT CHANGE UP (ref 0–0.5)
EOSINOPHIL NFR BLD AUTO: 0 % — SIGNIFICANT CHANGE UP (ref 0–6)
EPI CELLS # UR: 2 — SIGNIFICANT CHANGE UP
FLUAV AG NPH QL: SIGNIFICANT CHANGE UP
FLUBV AG NPH QL: SIGNIFICANT CHANGE UP
GLUCOSE SERPL-MCNC: 132 MG/DL — HIGH (ref 70–99)
GLUCOSE SERPL-MCNC: 136 MG/DL — HIGH (ref 70–99)
GLUCOSE UR QL: NEGATIVE MG/DL — SIGNIFICANT CHANGE UP
GRAN CASTS # UR COMP ASSIST: PRESENT
HCT VFR BLD CALC: 34.3 % — LOW (ref 39–50)
HCT VFR BLD CALC: 36.6 % — LOW (ref 39–50)
HGB BLD-MCNC: 11.5 G/DL — LOW (ref 13–17)
HGB BLD-MCNC: 12.5 G/DL — LOW (ref 13–17)
IMM GRANULOCYTES NFR BLD AUTO: 0.6 % — SIGNIFICANT CHANGE UP (ref 0–0.9)
INR BLD: 1.17 RATIO — SIGNIFICANT CHANGE UP (ref 0.85–1.18)
KETONES UR-MCNC: ABNORMAL MG/DL
LACTATE SERPL-SCNC: 2.4 MMOL/L — HIGH (ref 0.7–2)
LACTATE SERPL-SCNC: 3 MMOL/L — HIGH (ref 0.7–2)
LACTATE SERPL-SCNC: 3.6 MMOL/L — HIGH (ref 0.7–2)
LEUKOCYTE ESTERASE UR-ACNC: NEGATIVE — SIGNIFICANT CHANGE UP
LYMPHOCYTES # BLD AUTO: 0.12 K/UL — LOW (ref 1–3.3)
LYMPHOCYTES # BLD AUTO: 2.2 % — LOW (ref 13–44)
MAGNESIUM SERPL-MCNC: 1.5 MG/DL — LOW (ref 1.6–2.6)
MCHC RBC-ENTMCNC: 30 PG — SIGNIFICANT CHANGE UP (ref 27–34)
MCHC RBC-ENTMCNC: 30 PG — SIGNIFICANT CHANGE UP (ref 27–34)
MCHC RBC-ENTMCNC: 33.5 GM/DL — SIGNIFICANT CHANGE UP (ref 32–36)
MCHC RBC-ENTMCNC: 34.2 GM/DL — SIGNIFICANT CHANGE UP (ref 32–36)
MCV RBC AUTO: 87.8 FL — SIGNIFICANT CHANGE UP (ref 80–100)
MCV RBC AUTO: 89.6 FL — SIGNIFICANT CHANGE UP (ref 80–100)
MONOCYTES # BLD AUTO: 0.03 K/UL — SIGNIFICANT CHANGE UP (ref 0–0.9)
MONOCYTES NFR BLD AUTO: 0.6 % — LOW (ref 2–14)
NEUTROPHILS # BLD AUTO: 5.16 K/UL — SIGNIFICANT CHANGE UP (ref 1.8–7.4)
NEUTROPHILS NFR BLD AUTO: 96.4 % — HIGH (ref 43–77)
NITRITE UR-MCNC: NEGATIVE — SIGNIFICANT CHANGE UP
NRBC # BLD: 0 /100 WBCS — SIGNIFICANT CHANGE UP (ref 0–0)
NRBC # BLD: 0 /100 WBCS — SIGNIFICANT CHANGE UP (ref 0–0)
NT-PROBNP SERPL-SCNC: 1292 PG/ML — HIGH (ref 0–300)
PH UR: 5.5 — SIGNIFICANT CHANGE UP (ref 5–8)
PHOSPHATE SERPL-MCNC: 1.2 MG/DL — LOW (ref 2.5–4.5)
PLATELET # BLD AUTO: 129 K/UL — LOW (ref 150–400)
PLATELET # BLD AUTO: 143 K/UL — LOW (ref 150–400)
POTASSIUM SERPL-MCNC: 3.7 MMOL/L — SIGNIFICANT CHANGE UP (ref 3.5–5.3)
POTASSIUM SERPL-MCNC: 3.8 MMOL/L — SIGNIFICANT CHANGE UP (ref 3.5–5.3)
POTASSIUM SERPL-SCNC: 3.7 MMOL/L — SIGNIFICANT CHANGE UP (ref 3.5–5.3)
POTASSIUM SERPL-SCNC: 3.8 MMOL/L — SIGNIFICANT CHANGE UP (ref 3.5–5.3)
PROCALCITONIN SERPL-MCNC: 40.78 NG/ML — HIGH
PROT SERPL-MCNC: 5.9 G/DL — LOW (ref 6–8.3)
PROT SERPL-MCNC: 6.7 G/DL — SIGNIFICANT CHANGE UP (ref 6–8.3)
PROT UR-MCNC: 30 MG/DL
PROTHROM AB SERPL-ACNC: 13.3 SEC — HIGH (ref 9.5–13)
RBC # BLD: 3.83 M/UL — LOW (ref 4.2–5.8)
RBC # BLD: 4.17 M/UL — LOW (ref 4.2–5.8)
RBC # FLD: 14.9 % — HIGH (ref 10.3–14.5)
RBC # FLD: 14.9 % — HIGH (ref 10.3–14.5)
RBC CASTS # UR COMP ASSIST: 1 /HPF — SIGNIFICANT CHANGE UP (ref 0–4)
RSV RNA NPH QL NAA+NON-PROBE: SIGNIFICANT CHANGE UP
SARS-COV-2 RNA SPEC QL NAA+PROBE: SIGNIFICANT CHANGE UP
SODIUM SERPL-SCNC: 132 MMOL/L — LOW (ref 135–145)
SODIUM SERPL-SCNC: 134 MMOL/L — LOW (ref 135–145)
SP GR SPEC: 1.03 — SIGNIFICANT CHANGE UP (ref 1–1.03)
TROPONIN I, HIGH SENSITIVITY RESULT: 4 NG/L — SIGNIFICANT CHANGE UP
UROBILINOGEN FLD QL: 2 MG/DL (ref 0.2–1)
WBC # BLD: 10.7 K/UL — HIGH (ref 3.8–10.5)
WBC # BLD: 5.35 K/UL — SIGNIFICANT CHANGE UP (ref 3.8–10.5)
WBC # FLD AUTO: 10.7 K/UL — HIGH (ref 3.8–10.5)
WBC # FLD AUTO: 5.35 K/UL — SIGNIFICANT CHANGE UP (ref 3.8–10.5)
WBC UR QL: 2 /HPF — SIGNIFICANT CHANGE UP (ref 0–5)

## 2024-09-13 PROCEDURE — 74177 CT ABD & PELVIS W/CONTRAST: CPT | Mod: 26,MC

## 2024-09-13 PROCEDURE — 93010 ELECTROCARDIOGRAM REPORT: CPT

## 2024-09-13 PROCEDURE — 71045 X-RAY EXAM CHEST 1 VIEW: CPT | Mod: 26

## 2024-09-13 PROCEDURE — 99223 1ST HOSP IP/OBS HIGH 75: CPT | Mod: GC

## 2024-09-13 RX ORDER — SODIUM CHLORIDE 9 MG/ML
2200 INJECTION INTRAMUSCULAR; INTRAVENOUS; SUBCUTANEOUS ONCE
Refills: 0 | Status: COMPLETED | OUTPATIENT
Start: 2024-09-13 | End: 2024-09-13

## 2024-09-13 RX ORDER — SODIUM CHLORIDE 9 MG/ML
1000 INJECTION INTRAMUSCULAR; INTRAVENOUS; SUBCUTANEOUS
Refills: 0 | Status: DISCONTINUED | OUTPATIENT
Start: 2024-09-13 | End: 2024-09-13

## 2024-09-13 RX ORDER — VANCOMYCIN/0.9 % SOD CHLORIDE 1.75G/25
1250 PLASTIC BAG, INJECTION (ML) INTRAVENOUS EVERY 24 HOURS
Refills: 0 | Status: DISCONTINUED | OUTPATIENT
Start: 2024-09-14 | End: 2024-09-15

## 2024-09-13 RX ORDER — SODIUM PHOSPHATE, MONOBASIC, MONOHYDRATE AND SODIUM PHOSPHATE, DIBASIC ANHYDROUS 276; 142 MG/ML; MG/ML
30 INJECTION, SOLUTION INTRAVENOUS ONCE
Refills: 0 | Status: COMPLETED | OUTPATIENT
Start: 2024-09-13 | End: 2024-09-13

## 2024-09-13 RX ORDER — ACETAMINOPHEN 325 MG/1
1000 TABLET ORAL ONCE
Refills: 0 | Status: COMPLETED | OUTPATIENT
Start: 2024-09-13 | End: 2024-09-13

## 2024-09-13 RX ORDER — ASPIRIN 81 MG
81 TABLET, DELAYED RELEASE (ENTERIC COATED) ORAL DAILY
Refills: 0 | Status: DISCONTINUED | OUTPATIENT
Start: 2024-09-13 | End: 2024-09-19

## 2024-09-13 RX ORDER — PIPERACILLIN SODIUM AND TAZOBACTAM SODIUM 3; .375 G/15ML; G/15ML
3.38 INJECTION, POWDER, FOR SOLUTION INTRAVENOUS EVERY 8 HOURS
Refills: 0 | Status: DISCONTINUED | OUTPATIENT
Start: 2024-09-14 | End: 2024-09-15

## 2024-09-13 RX ORDER — VANCOMYCIN/0.9 % SOD CHLORIDE 1.75G/25
1500 PLASTIC BAG, INJECTION (ML) INTRAVENOUS ONCE
Refills: 0 | Status: COMPLETED | OUTPATIENT
Start: 2024-09-13 | End: 2024-09-13

## 2024-09-13 RX ORDER — HEPARIN SODIUM,BOVINE 1000/ML
5000 VIAL (ML) INJECTION EVERY 12 HOURS
Refills: 0 | Status: DISCONTINUED | OUTPATIENT
Start: 2024-09-13 | End: 2024-09-19

## 2024-09-13 RX ORDER — ACETAMINOPHEN 325 MG/1
650 TABLET ORAL EVERY 6 HOURS
Refills: 0 | Status: DISCONTINUED | OUTPATIENT
Start: 2024-09-13 | End: 2024-09-19

## 2024-09-13 RX ORDER — VANCOMYCIN/0.9 % SOD CHLORIDE 1.75G/25
PLASTIC BAG, INJECTION (ML) INTRAVENOUS
Refills: 0 | Status: DISCONTINUED | OUTPATIENT
Start: 2024-09-13 | End: 2024-09-15

## 2024-09-13 RX ORDER — CASANTHRANOL/DOCUSATE SODIUM
75 SYRUP ORAL
Refills: 0 | Status: DISCONTINUED | OUTPATIENT
Start: 2024-09-13 | End: 2024-09-13

## 2024-09-13 RX ORDER — SODIUM CHLORIDE 9 MG/ML
1000 INJECTION INTRAMUSCULAR; INTRAVENOUS; SUBCUTANEOUS ONCE
Refills: 0 | Status: COMPLETED | OUTPATIENT
Start: 2024-09-13 | End: 2024-09-13

## 2024-09-13 RX ORDER — VANCOMYCIN/0.9 % SOD CHLORIDE 1.75G/25
1500 PLASTIC BAG, INJECTION (ML) INTRAVENOUS EVERY 12 HOURS
Refills: 0 | Status: DISCONTINUED | OUTPATIENT
Start: 2024-09-13 | End: 2024-09-13

## 2024-09-13 RX ORDER — ASPIRIN 81 MG
1 TABLET, DELAYED RELEASE (ENTERIC COATED) ORAL
Refills: 0 | DISCHARGE

## 2024-09-13 RX ORDER — EZETIMIBE 10 MG/1
10 TABLET ORAL DAILY
Refills: 0 | Status: DISCONTINUED | OUTPATIENT
Start: 2024-09-13 | End: 2024-09-19

## 2024-09-13 RX ORDER — MAGNESIUM, ALUMINUM HYDROXIDE 200-225/5
30 SUSPENSION, ORAL (FINAL DOSE FORM) ORAL EVERY 4 HOURS
Refills: 0 | Status: DISCONTINUED | OUTPATIENT
Start: 2024-09-13 | End: 2024-09-19

## 2024-09-13 RX ORDER — PIPERACILLIN SODIUM AND TAZOBACTAM SODIUM 3; .375 G/15ML; G/15ML
3.38 INJECTION, POWDER, FOR SOLUTION INTRAVENOUS ONCE
Refills: 0 | Status: COMPLETED | OUTPATIENT
Start: 2024-09-13 | End: 2024-09-13

## 2024-09-13 RX ORDER — CASANTHRANOL/DOCUSATE SODIUM
30 SYRUP ORAL
Refills: 0 | Status: DISCONTINUED | OUTPATIENT
Start: 2024-09-14 | End: 2024-09-14

## 2024-09-13 RX ORDER — ONDANSETRON 2 MG/ML
4 INJECTION, SOLUTION INTRAMUSCULAR; INTRAVENOUS EVERY 8 HOURS
Refills: 0 | Status: DISCONTINUED | OUTPATIENT
Start: 2024-09-13 | End: 2024-09-19

## 2024-09-13 RX ORDER — CASANTHRANOL/DOCUSATE SODIUM
75 SYRUP ORAL ONCE
Refills: 0 | Status: COMPLETED | OUTPATIENT
Start: 2024-09-13 | End: 2024-09-13

## 2024-09-13 RX ORDER — AMLODIPINE BESYLATE 10 MG/1
1 TABLET ORAL
Refills: 0 | DISCHARGE

## 2024-09-13 RX ADMIN — PIPERACILLIN SODIUM AND TAZOBACTAM SODIUM 200 GRAM(S): 3; .375 INJECTION, POWDER, FOR SOLUTION INTRAVENOUS at 22:39

## 2024-09-13 RX ADMIN — Medication 25 GRAM(S): at 21:19

## 2024-09-13 RX ADMIN — SODIUM PHOSPHATE, MONOBASIC, MONOHYDRATE AND SODIUM PHOSPHATE, DIBASIC ANHYDROUS 85 MILLIMOLE(S): 276; 142 INJECTION, SOLUTION INTRAVENOUS at 21:20

## 2024-09-13 RX ADMIN — SODIUM CHLORIDE 2200 MILLILITER(S): 9 INJECTION INTRAMUSCULAR; INTRAVENOUS; SUBCUTANEOUS at 17:18

## 2024-09-13 RX ADMIN — Medication 75 MILLIGRAM(S): at 22:24

## 2024-09-13 RX ADMIN — SODIUM CHLORIDE 75 MILLILITER(S): 9 INJECTION INTRAMUSCULAR; INTRAVENOUS; SUBCUTANEOUS at 21:20

## 2024-09-13 RX ADMIN — ACETAMINOPHEN 1000 MILLIGRAM(S): 325 TABLET ORAL at 17:35

## 2024-09-13 RX ADMIN — SODIUM CHLORIDE 1000 MILLILITER(S): 9 INJECTION INTRAMUSCULAR; INTRAVENOUS; SUBCUTANEOUS at 22:00

## 2024-09-13 RX ADMIN — ACETAMINOPHEN 400 MILLIGRAM(S): 325 TABLET ORAL at 17:18

## 2024-09-13 NOTE — ED ADULT NURSE NOTE - NSFALLHARMRISKINTERV_ED_ALL_ED

## 2024-09-13 NOTE — ED PROVIDER NOTE - CARE PLAN
Goal:	Acute febrile illness   Principal Discharge DX:	Acute febrile illness  Goal:	Acute febrile illness  Secondary Diagnosis:	Azotemia   1

## 2024-09-13 NOTE — PATIENT PROFILE ADULT - FUNCTIONAL SCREEN CURRENT LEVEL: COMMUNICATION, MLM
Encounter will be addressed with Dr. Rankin on 03/16/2021   0 = understands/communicates without difficulty

## 2024-09-13 NOTE — H&P ADULT - HISTORY OF PRESENT ILLNESS
80 yo male with pmhx of HTN, RCC s/p partial nephrectomy (2014), gastric cancer s/p gastrectomy (2014), bowel performation (2019), TIA (2/2024) presenting to [facility] with flu-like symptoms. Symptoms started yesterday, fever 102-103, chills, and body aches. Home COVID test was negative. Was prescribed Tamiflu earlier today after virtual urgent care visit, took one dose prior to arrival. He was not able to keep fluids down which is what prompted him to come to the ED. Denies nausea, vomiting, diarrhea, SOB, coughing, wheezing, rhinorrhea. Denies dysuria but does note decreased urine output despite receiving fluids in the ED. Urine collected appears dark yellow. Has not received flu vaccine this season. 80 yo male with pmhx of HTN, RCC s/p partial nephrectomy (2014), gastric cancer s/p gastrectomy (2014), bowel perforation (2019), TIA (2/2024) presenting to [facility] with flu-like symptoms. Symptoms started yesterday, fever 102-103, chills, and body aches. Home COVID test was negative. Was prescribed Tamiflu earlier today after virtual urgent care visit, took one dose prior to arrival. He was not able to keep fluids down which is what prompted him to come to the ED. Denies nausea, vomiting, diarrhea, SOB, coughing, wheezing, rhinorrhea. Denies dysuria but does note decreased urine output despite receiving fluids in the ED. Urine collected appears dark yellow. Has not received flu vaccine this season. 80 yo male with pmhx of HTN, RCC s/p partial nephrectomy (2014), gastric cancer s/p gastrectomy (2014), bowel perforation (2019), TIA (2/2024), SVT s/p ablation, established with cardiology recommending pacemaker for sinus pauses presenting to [facility] with flu-like symptoms. Symptoms started yesterday, fever 102-103, chills, and body aches. Home COVID test was negative. Was prescribed Tamiflu earlier today after virtual urgent care visit, took one dose prior to arrival. He was not able to keep fluids down which is what prompted him to come to the ED. Denies nausea, vomiting, diarrhea, SOB, coughing, wheezing, rhinorrhea. Denies dysuria but does note decreased urine output despite receiving fluids in the ED. Urine collected appears dark yellow. Has not received flu vaccine this season.

## 2024-09-13 NOTE — ED ADULT NURSE NOTE - NSFALLRISKFACTORS_ED_ALL_ED
Bone Condition: Including osteoporosis, prolonged steroid use or metastatic bone disease/cancer/Coagulation: Bleeding disorder either through use of anticoagulants or underlying clinical condition(s)/Other

## 2024-09-13 NOTE — ED ADULT NURSE NOTE - NS ED NURSE REPORT GIVEN TO FT
for dysuria. Negative for flank pain.   Musculoskeletal:  Negative for arthralgias, back pain and myalgias.   Skin:  Positive for color change (\"liver spots\") and rash (\"psoriasis\").   Neurological:  Negative for dizziness, weakness and headaches.   Psychiatric/Behavioral:  Negative for confusion. The patient is not nervous/anxious.    All other systems reviewed and are negative.       Physical Exam:   BP (!) 150/54   Pulse 55   Temp 97.7 °F (36.5 °C) (Oral)   Resp 18   Ht 1.626 m (5' 4\")   Wt 65.3 kg (144 lb)   SpO2 100%   BMI 24.72 kg/m²   Temp (24hrs), Av.7 °F (36.5 °C), Min:97.7 °F (36.5 °C), Max:97.7 °F (36.5 °C)    Recent Labs     24  1533   POCGLU 67     No intake or output data in the 24 hours ending 24 1550    Physical Exam  Vitals reviewed.   Constitutional:       General: She is not in acute distress.     Appearance: Normal appearance. She is normal weight. She is not ill-appearing, toxic-appearing or diaphoretic.   HENT:      Head: Normocephalic and atraumatic.      Right Ear: External ear normal.      Left Ear: External ear normal.      Nose: Nose normal.      Mouth/Throat:      Mouth: Mucous membranes are moist.      Pharynx: Oropharynx is clear.   Eyes:      General: No scleral icterus.     Extraocular Movements: Extraocular movements intact.      Conjunctiva/sclera: Conjunctivae normal.      Pupils: Pupils are equal, round, and reactive to light.   Cardiovascular:      Rate and Rhythm: Normal rate and regular rhythm.      Pulses: Normal pulses.      Heart sounds: Murmur heard.      Systolic murmur is present with a grade of 2/6.   Pulmonary:      Effort: Pulmonary effort is normal. No respiratory distress.      Breath sounds: Normal breath sounds.   Abdominal:      General: Abdomen is flat. Bowel sounds are normal. There is no distension.      Palpations: Abdomen is soft.      Tenderness: There is no abdominal tenderness. There is no right CVA tenderness or left CVA tenderness. 
[place]

## 2024-09-13 NOTE — H&P ADULT - ATTENDING COMMENTS
#Severe sepsis bacterial vs viral pneumonia  #Hypotensive briefly w/ MAP <60  #Elevated Lactate  #Dehydration  #hyponatremia  #hypophosphatemia  #hypomagnesemia  - f/up bcx, Ucx  - vancomycin  - zosyn  - tamiflu - symptoms align w. flu, patient did not get flu vaccine  - procal 40  - NS bolus  - abd would culture  - ID consult    #Transaminitis - could be from sepsis vs congestive hepatopathy   - CT unremarkable  - RUQ sonogram    #Elevated BNP  - Echocardiogram  - cardiology    #TIA  - c/w aspirin and ezetimibe

## 2024-09-13 NOTE — PATIENT PROFILE ADULT - FALL HARM RISK - HARM RISK INTERVENTIONS

## 2024-09-13 NOTE — ED PROVIDER NOTE - PHYSICAL EXAMINATION
General:     NAD, Ill looking  Head:     NC/AT, EOMI, oral mucosa dry  Neck:     trachea midline  Lungs:     CTA b/l, no w/r/r  CVS:     S1S2, RRR, no m/g/r  Abd:     +BS, s/nt/nd, no organomegaly  Ext:    2+ radial and pedal pulses, no c/c/e  Neuro: AAOx3, no sensory/motor deficits

## 2024-09-13 NOTE — ED PROVIDER NOTE - CLINICAL SUMMARY MEDICAL DECISION MAKING FREE TEXT BOX
79-year-old male with past history of hypertension and renal cell carcinoma in the past came to the emergency room chief complaint of fever going on since yesterday associated body aches fatigue nausea and vomiting patient was seen in the urgent care with a telemetry consultation was started on Tamiflu today no flu test or COVID test was done  On exam ill-looking, mucous membranes dry

## 2024-09-13 NOTE — H&P ADULT - ASSESSMENT
80 yo male with pmhx of HTN, RCC s/p partial nephrectomy (2014), gastric cancer s/p gastrectomy (2014), bowel perforation (2019), TIA (2/2024) presenting to [facility] with flu-like symptoms admitted for sepsis secondary to bacterial vs viral pneumonia.    #Severe sepsis bacterial vs viral pneumonia  #Hypotensive briefly w/ MAP <60  #Elevated Lactate  #Dehydration  -Admit to medicine  -VS T98.1, , /70, RR18, SpO2 97% RA  -Pt reports fever 102-103 at home  -WBC 5 > 10  -Lactate 3.6  -Procal 40  -Flu/covid/RSV neg  -CXR and CT AP unremarkable  -S/p 2200cc NS in ED  -Additional 1000cc NS given  -S/p levofloxacin in ED  -Start vanc/Zosyn (hx of MRSA)  -C/w Tamiflu, sxs are consistent with flu, pt has not been vaccinated  -F/u UCx and BCx  -F/u wound culture from drainage at abdominal surgical site  -ID consult  -Will hold off on additional IVF since proBNP elevated, see below    #Hyponatremia  #Hypophosphatemia  #Hypomagnesemia  -Na 134 > 132  -P 1.2  -Mg 1.5  -Holding off on additional IVF  -Replete P and Mg  -F/u labs    #Transaminitis   -Likely due to sepsis but also consider congestive hepatopathy  -,   -CT unremarkable  -F/u RUQ sonogram    #Elevated proBNP  -1292  -TTE f/u  -Cardio consult    #Decreased urine output  -Likely 2/2 dehydration  -Bladder scan 200cc    #TIA  -C/w home aspirin and ezetimibe     VTE PPX - heparin  Diet - regular  Full code    Discussed with Dr. Fay 80 yo male with pmhx of HTN, RCC s/p partial nephrectomy (2014), gastric cancer s/p gastrectomy (2014), bowel perforation (2019), TIA (2/2024) presenting to [facility] with flu-like symptoms admitted for sepsis secondary to bacterial vs viral pneumonia.    #Severe sepsis bacterial vs viral pneumonia  #Hypotensive briefly w/ MAP <60  #Elevated Lactate  #Dehydration  -Admit to medicine  -VS T98.1, , /70, RR18, SpO2 97% RA  -Pt reports fever 102-103 at home  -WBC 5 > 10  -Lactate 3.6  -Procal 40  -Flu/covid/RSV neg  -CXR and CT AP unremarkable  -S/p 2200cc NS in ED  -Additional 1000cc NS given  -S/p levofloxacin in ED  -Start vanc/Zosyn (hx of MRSA)  -C/w Tamiflu, sxs are consistent with flu, pt has not been vaccinated  -F/u UCx and BCx  -F/u wound culture from drainage at abdominal surgical site  -ID consult  -Will hold off on additional IVF since proBNP elevated, see below    #Hyponatremia  #Hypophosphatemia  #Hypomagnesemia  -Na 134 > 132  -P 1.2  -Mg 1.5  -Replete P and Mg  -F/u labs    #Transaminitis   -Likely due to sepsis but also consider congestive hepatopathy  -,   -CT unremarkable  -F/u RUQ sonogram    #Elevated proBNP  -1292  -TTE f/u  -Cardio consult    #Decreased urine output  -Likely 2/2 dehydration  -Bladder scan 200cc    #HTN  -Hold home amlodipine due to low BPs    #TIA  -C/w home aspirin and ezetimibe     VTE PPX - heparin  Diet - regular  Full code    Discussed with Dr. Fay 80 yo male with pmhx of HTN, RCC s/p partial nephrectomy (2014), gastric cancer s/p gastrectomy (2014), bowel perforation (2019), TIA (2/2024) presenting to [facility] with flu-like symptoms admitted for sepsis secondary to bacterial vs viral pneumonia.    #Severe sepsis bacterial vs viral pneumonia  #Hypotensive briefly w/ MAP <60  #Elevated Lactate  #Dehydration  -Admit to medicine  -VS T98.1, , /70, RR18, SpO2 97% RA  -Pt reports fever 102-103 at home  -WBC 5 > 10  -Lactate 3.6  -Procal 40  -Flu/covid/RSV neg  -CXR and CT AP unremarkable  -S/p 2200cc NS in ED  -Additional 1000cc NS given  -S/p levofloxacin in ED  -Start vanc/Zosyn (hx of MRSA)  -C/w Tamiflu, sxs are consistent with flu, pt has not been vaccinated  -F/u UCx and BCx  -F/u wound culture from drainage at abdominal surgical site  -ID consult  -Will hold off on additional IVF since proBNP elevated, see below    #Hyponatremia  #Hypophosphatemia  #Hypomagnesemia  -Na 134 > 132  -P 1.2  -Mg 1.5  -Replete P and Mg  -F/u labs    #Transaminitis   -Likely due to sepsis but also consider congestive hepatopathy  -,   -CT unremarkable  -F/u RUQ sonogram    #Elevated proBNP  -1292  -TTE f/u  -Trop f/u  -Cardio consult    #Decreased urine output  -Likely 2/2 dehydration  -Bladder scan 200cc    #HTN  -Hold home amlodipine due to low BPs    #TIA  -C/w home aspirin and ezetimibe     VTE PPX - heparin  Diet - regular  Full code    Discussed with Dr. Fay 78 yo male with pmhx of HTN, RCC s/p partial nephrectomy (2014), gastric cancer s/p gastrectomy (2014), bowel perforation (2019), TIA (2/2024), SVT s/p ablation, established with cardiology recommending pacemaker for sinus pauses presenting to [facility] with flu-like symptoms admitted for sepsis secondary to bacterial vs viral pneumonia.    #Severe sepsis bacterial vs viral pneumonia  #Hypotensive briefly w/ MAP <60  #Elevated Lactate  #Dehydration  -Admit to medicine  -VS T98.1, , /70, RR18, SpO2 97% RA  -Pt reports fever 102-103 at home  -WBC 5 > 10  -Lactate 3.6  -Procal 40  -Flu/covid/RSV neg  -CXR and CT AP unremarkable  -S/p 2200cc NS in ED  -Additional 1000cc NS given  -S/p levofloxacin in ED  -Start vanc/Zosyn (hx of MRSA)  -C/w Tamiflu, sxs are consistent with flu, pt has not been vaccinated  -F/u UCx and BCx  -F/u wound culture from drainage at abdominal surgical site  -ID consult  -Will hold off on additional IVF since proBNP elevated, see below    #Hyponatremia  #Hypophosphatemia  #Hypomagnesemia  -Na 134 > 132  -P 1.2  -Mg 1.5  -Replete P and Mg  -F/u labs    #Transaminitis   -Likely due to sepsis but also consider congestive hepatopathy  -,   -CT unremarkable  -F/u RUQ sonogram    #Elevated proBNP  -1292  -TTE f/u  -Trop f/u  -Cardio consult    #Decreased urine output  -Likely 2/2 dehydration  -Bladder scan 200cc    #HTN  -Hold home amlodipine due to low BPs    #TIA  -C/w home aspirin and ezetimibe     VTE PPX - heparin  Diet - regular  Full code    Discussed with Dr. Fay 80 yo male with pmhx of HTN, RCC s/p partial nephrectomy (2014), gastric cancer s/p gastrectomy (2014), bowel perforation (2019), TIA (2/2024), SVT s/p ablation, established with cardiology recommending pacemaker for sinus pauses presenting to ED with flu-like symptoms admitted for sepsis secondary to bacterial vs viral pneumonia.    #Severe sepsis bacterial vs viral pneumonia  #Hypotensive briefly w/ MAP <60  #Elevated Lactate  #Dehydration  -Admit to medicine  -VS T98.1, , /70, RR18, SpO2 97% RA  -Pt reports fever 102-103 at home  -WBC 5 > 10  -Lactate 3.6  -Procal 40  -Flu/covid/RSV neg  -CXR and CT AP unremarkable  -S/p 2200cc NS in ED  -Additional 1000cc NS given  -S/p levofloxacin in ED  -Start vanc/Zosyn (hx of MRSA)  -C/w Tamiflu, sxs are consistent with flu, pt has not been vaccinated  -F/u UCx and BCx  -F/u wound culture from drainage at abdominal surgical site  -Trend lactate  -ID consult  -Will hold off on additional IVF since proBNP elevated, see below    #Hyponatremia  #Hypophosphatemia  #Hypomagnesemia  -Na 134 > 132  -P 1.2  -Mg 1.5  -Replete P and Mg  -F/u labs    #Transaminitis   -Likely due to sepsis but also consider congestive hepatopathy  -,   -CT unremarkable  -F/u RUQ sonogram    #Elevated proBNP  -1292  -TTE f/u  -Trop f/u  -Cardio consult    #Decreased urine output  -Likely 2/2 dehydration  -Bladder scan 200cc    #HTN  -Hold home amlodipine due to low BPs    #TIA  -C/w home aspirin and ezetimibe     VTE PPX - heparin  Diet - regular  Full code    Discussed with Dr. Fay

## 2024-09-13 NOTE — H&P ADULT - NSHPPHYSICALEXAM_GEN_ALL_CORE
GENERAL: Appears sick, but NAD  HEAD:  Atraumatic, normocephalic  EYES: EOMI, conjunctiva and sclera clear  HEART: Regular rate and rhythm, no murmurs  LUNGS: Unlabored respirations. Clear to auscultation bilaterally.  ABDOMEN: Soft, nontender, nondistended, +BS  SKIN: Right of umbilicus, surgical scar with blood drainage  EXTREMITIES: No clubbing, cyanosis, or edema  NERVOUS SYSTEM: A&Ox3, moving all extremities

## 2024-09-13 NOTE — ED PROVIDER NOTE - OBJECTIVE STATEMENT
79-year-old male with past history of hypertension and renal cell carcinoma in the past came to the emergency room chief complaint of fever going on since yesterday associated body aches fatigue nausea and vomiting patient was seen in the urgent care with a telemetry consultation was started on Tamiflu today no flu test or COVID test was done

## 2024-09-14 ENCOUNTER — RESULT REVIEW (OUTPATIENT)
Age: 79
End: 2024-09-14

## 2024-09-14 LAB
-  K. PNEUMONIAE GROUP: SIGNIFICANT CHANGE UP
ALBUMIN SERPL ELPH-MCNC: 2.5 G/DL — LOW (ref 3.3–5)
ALBUMIN SERPL ELPH-MCNC: 2.7 G/DL — LOW (ref 3.3–5)
ALP SERPL-CCNC: 119 U/L — SIGNIFICANT CHANGE UP (ref 40–120)
ALP SERPL-CCNC: 129 U/L — HIGH (ref 40–120)
ALT FLD-CCNC: 119 U/L — HIGH (ref 10–45)
ALT FLD-CCNC: 142 U/L — HIGH (ref 10–45)
ANION GAP SERPL CALC-SCNC: 10 MMOL/L — SIGNIFICANT CHANGE UP (ref 5–17)
ANION GAP SERPL CALC-SCNC: 12 MMOL/L — SIGNIFICANT CHANGE UP (ref 5–17)
AST SERPL-CCNC: 132 U/L — HIGH (ref 10–40)
AST SERPL-CCNC: 196 U/L — HIGH (ref 10–40)
BILIRUB SERPL-MCNC: 0.9 MG/DL — SIGNIFICANT CHANGE UP (ref 0.2–1.2)
BILIRUB SERPL-MCNC: 1.4 MG/DL — HIGH (ref 0.2–1.2)
BUN SERPL-MCNC: 21 MG/DL — SIGNIFICANT CHANGE UP (ref 7–23)
BUN SERPL-MCNC: 23 MG/DL — SIGNIFICANT CHANGE UP (ref 7–23)
CALCIUM SERPL-MCNC: 7.3 MG/DL — LOW (ref 8.4–10.5)
CALCIUM SERPL-MCNC: 7.6 MG/DL — LOW (ref 8.4–10.5)
CHLORIDE SERPL-SCNC: 106 MMOL/L — SIGNIFICANT CHANGE UP (ref 96–108)
CHLORIDE SERPL-SCNC: 109 MMOL/L — HIGH (ref 96–108)
CO2 SERPL-SCNC: 19 MMOL/L — LOW (ref 22–31)
CO2 SERPL-SCNC: 21 MMOL/L — LOW (ref 22–31)
CREAT SERPL-MCNC: 1.02 MG/DL — SIGNIFICANT CHANGE UP (ref 0.5–1.3)
CREAT SERPL-MCNC: 1.29 MG/DL — SIGNIFICANT CHANGE UP (ref 0.5–1.3)
CULTURE RESULTS: SIGNIFICANT CHANGE UP
EGFR: 56 ML/MIN/1.73M2 — LOW
EGFR: 74 ML/MIN/1.73M2 — SIGNIFICANT CHANGE UP
GLUCOSE SERPL-MCNC: 119 MG/DL — HIGH (ref 70–99)
GLUCOSE SERPL-MCNC: 173 MG/DL — HIGH (ref 70–99)
GRAM STN FLD: ABNORMAL
GRAM STN FLD: ABNORMAL
HCT VFR BLD CALC: 30.6 % — LOW (ref 39–50)
HGB BLD-MCNC: 10.1 G/DL — LOW (ref 13–17)
LACTATE SERPL-SCNC: 1.2 MMOL/L — SIGNIFICANT CHANGE UP (ref 0.7–2)
LACTATE SERPL-SCNC: 2.8 MMOL/L — HIGH (ref 0.7–2)
MAGNESIUM SERPL-MCNC: 2.1 MG/DL — SIGNIFICANT CHANGE UP (ref 1.6–2.6)
MCHC RBC-ENTMCNC: 29.6 PG — SIGNIFICANT CHANGE UP (ref 27–34)
MCHC RBC-ENTMCNC: 33 GM/DL — SIGNIFICANT CHANGE UP (ref 32–36)
MCV RBC AUTO: 89.7 FL — SIGNIFICANT CHANGE UP (ref 80–100)
METHOD TYPE: SIGNIFICANT CHANGE UP
NRBC # BLD: 0 /100 WBCS — SIGNIFICANT CHANGE UP (ref 0–0)
PHOSPHATE SERPL-MCNC: 4 MG/DL — SIGNIFICANT CHANGE UP (ref 2.5–4.5)
PLATELET # BLD AUTO: 103 K/UL — LOW (ref 150–400)
POTASSIUM SERPL-MCNC: 3.6 MMOL/L — SIGNIFICANT CHANGE UP (ref 3.5–5.3)
POTASSIUM SERPL-MCNC: 3.8 MMOL/L — SIGNIFICANT CHANGE UP (ref 3.5–5.3)
POTASSIUM SERPL-SCNC: 3.6 MMOL/L — SIGNIFICANT CHANGE UP (ref 3.5–5.3)
POTASSIUM SERPL-SCNC: 3.8 MMOL/L — SIGNIFICANT CHANGE UP (ref 3.5–5.3)
PROT SERPL-MCNC: 5.5 G/DL — LOW (ref 6–8.3)
PROT SERPL-MCNC: 5.7 G/DL — LOW (ref 6–8.3)
RBC # BLD: 3.41 M/UL — LOW (ref 4.2–5.8)
RBC # FLD: 15.5 % — HIGH (ref 10.3–14.5)
SODIUM SERPL-SCNC: 137 MMOL/L — SIGNIFICANT CHANGE UP (ref 135–145)
SODIUM SERPL-SCNC: 140 MMOL/L — SIGNIFICANT CHANGE UP (ref 135–145)
SPECIMEN SOURCE: SIGNIFICANT CHANGE UP
TROPONIN I, HIGH SENSITIVITY RESULT: 6.2 NG/L — SIGNIFICANT CHANGE UP
WBC # BLD: 7.41 K/UL — SIGNIFICANT CHANGE UP (ref 3.8–10.5)
WBC # FLD AUTO: 7.41 K/UL — SIGNIFICANT CHANGE UP (ref 3.8–10.5)

## 2024-09-14 PROCEDURE — 93306 TTE W/DOPPLER COMPLETE: CPT | Mod: 26

## 2024-09-14 PROCEDURE — 76705 ECHO EXAM OF ABDOMEN: CPT | Mod: 26

## 2024-09-14 PROCEDURE — 99222 1ST HOSP IP/OBS MODERATE 55: CPT

## 2024-09-14 PROCEDURE — 99233 SBSQ HOSP IP/OBS HIGH 50: CPT

## 2024-09-14 RX ORDER — SODIUM CHLORIDE 9 MG/ML
500 INJECTION INTRAMUSCULAR; INTRAVENOUS; SUBCUTANEOUS ONCE
Refills: 0 | Status: COMPLETED | OUTPATIENT
Start: 2024-09-14 | End: 2024-09-14

## 2024-09-14 RX ADMIN — Medication 81 MILLIGRAM(S): at 13:52

## 2024-09-14 RX ADMIN — PIPERACILLIN SODIUM AND TAZOBACTAM SODIUM 25 GRAM(S): 3; .375 INJECTION, POWDER, FOR SOLUTION INTRAVENOUS at 21:58

## 2024-09-14 RX ADMIN — Medication 70 MILLILITER(S): at 08:09

## 2024-09-14 RX ADMIN — PIPERACILLIN SODIUM AND TAZOBACTAM SODIUM 25 GRAM(S): 3; .375 INJECTION, POWDER, FOR SOLUTION INTRAVENOUS at 05:59

## 2024-09-14 RX ADMIN — PIPERACILLIN SODIUM AND TAZOBACTAM SODIUM 25 GRAM(S): 3; .375 INJECTION, POWDER, FOR SOLUTION INTRAVENOUS at 13:57

## 2024-09-14 RX ADMIN — EZETIMIBE 10 MILLIGRAM(S): 10 TABLET ORAL at 21:57

## 2024-09-14 RX ADMIN — Medication 5000 UNIT(S): at 17:40

## 2024-09-14 RX ADMIN — Medication 30 MILLIGRAM(S): at 05:58

## 2024-09-14 RX ADMIN — SODIUM CHLORIDE 500 MILLILITER(S): 9 INJECTION INTRAMUSCULAR; INTRAVENOUS; SUBCUTANEOUS at 02:10

## 2024-09-14 RX ADMIN — Medication 300 MILLIGRAM(S): at 21:57

## 2024-09-14 RX ADMIN — Medication 300 MILLIGRAM(S): at 00:19

## 2024-09-14 RX ADMIN — ONDANSETRON 4 MILLIGRAM(S): 2 INJECTION, SOLUTION INTRAMUSCULAR; INTRAVENOUS at 01:49

## 2024-09-14 RX ADMIN — Medication 5000 UNIT(S): at 05:02

## 2024-09-14 RX ADMIN — Medication 25 GRAM(S): at 04:58

## 2024-09-14 NOTE — PROVIDER CONTACT NOTE (CRITICAL VALUE NOTIFICATION) - TEST AND RESULT REPORTED:
lactate 3.6
Lactate 2.4
preliminary blood cultures shows growth in anaerobic and aerobic bottle. gram negative rods
Lactate 3.0

## 2024-09-14 NOTE — PROGRESS NOTE ADULT - SUBJECTIVE AND OBJECTIVE BOX
Patient is a 79y old  Male who presents with a chief complaint of severe sepsis 2/2 viral vs bacterial pneumonia (13 Sep 2024 22:14)    Patient seen and examined at bedside.  S: Endorsed some dysuria and increased urinary frequency     ALLERGIES:  sulfa drugs (Unknown)    MEDICATIONS:  acetaminophen     Tablet .. 650 milliGRAM(s) Oral every 6 hours PRN  aluminum hydroxide/magnesium hydroxide/simethicone Suspension 30 milliLiter(s) Oral every 4 hours PRN  aspirin  chewable 81 milliGRAM(s) Oral daily  ezetimibe 10 milliGRAM(s) Oral daily  heparin   Injectable 5000 Unit(s) SubCutaneous every 12 hours  lactated ringers. 1000 milliLiter(s) IV Continuous <Continuous>  melatonin 3 milliGRAM(s) Oral at bedtime PRN  ondansetron Injectable 4 milliGRAM(s) IV Push every 8 hours PRN  oseltamivir 30 milliGRAM(s) Oral two times a day  piperacillin/tazobactam IVPB.. 3.375 Gram(s) IV Intermittent every 8 hours  vancomycin  IVPB 1250 milliGRAM(s) IV Intermittent every 24 hours  vancomycin  IVPB        Vital Signs Last 24 Hrs  T(F): 97.9 (14 Sep 2024 12:39), Max: 98.3 (14 Sep 2024 01:32)  HR: 81 (14 Sep 2024 12:39) (69 - 100)  BP: 102/63 (14 Sep 2024 12:39) (82/67 - 119/68)  RR: 18 (14 Sep 2024 12:39) (16 - 23)  SpO2: 96% (14 Sep 2024 12:39) (96% - 99%)  I&O's Summary    13 Sep 2024 07:01  -  14 Sep 2024 07:00  --------------------------------------------------------  IN: 0 mL / OUT: 720 mL / NET: -720 mL      PHYSICAL EXAM:  General: NAD, A/O x 3  ENT: MMM  Lungs: Clear to auscultation bilaterally   Cardio: RR, S1/S2  Abdomen: Soft, NT/ND, Normal active Bowel Sounds   Extremities: No cyanosis, No edema      LABS:                        10.1   7.41  )-----------( 103      ( 14 Sep 2024 06:49 )             30.6     09-14    140  |  109  |  21  ----------------------------<  119  3.6   |  21  |  1.02    Ca    7.3      14 Sep 2024 06:49  Phos  4.0     09-14  Mg     2.1     09-14    TPro  5.5  /  Alb  2.5  /  TBili  0.9  /  DBili  x   /  AST  132  /  ALT  119  /  AlkPhos  119  09-14      PT/INR - ( 13 Sep 2024 17:08 )   PT: 13.3 sec;   INR: 1.17 ratio   PTT - ( 13 Sep 2024 17:08 )  PTT:28.6 sec    Lactate, Blood: 1.2 mmol/L (09-14 @ 06:49)  Lactate, Blood: 2.8 mmol/L (09-14 @ 00:35)  Lactate, Blood: 3.0 mmol/L (09-13 @ 20:19)  Lactate, Blood: 2.4 mmol/L (09-13 @ 18:30)  Lactate, Blood: 3.6 mmol/L (09-13 @ 17:08)    CARDIAC MARKERS ( 14 Sep 2024 00:35 )  x     / 6.2 ng/L / x     / x     / x      CARDIAC MARKERS ( 13 Sep 2024 17:08 )  x     / 4.0 ng/L / x     / x     / x          Urinalysis Basic - ( 14 Sep 2024 06:49 )  Color: x / Appearance: x / SG: x / pH: x  Gluc: 119 mg/dL / Ketone: x  / Bili: x / Urobili: x   Blood: x / Protein: x / Nitrite: x   Leuk Esterase: x / RBC: x / WBC x   Sq Epi: x / Non Sq Epi: x / Bacteria: x      Culture - Blood (collected 13 Sep 2024 17:08)  Source: .Blood Blood-Peripheral  Gram Stain (14 Sep 2024 08:16):    Growth in aerobic and anaerobic bottles: Gram Negative Rods  Preliminary Report (14 Sep 2024 08:16):    Growth in aerobic and anaerobic bottles: Gram Negative Rods    Culture - Blood (collected 13 Sep 2024 17:08)  Source: .Blood Blood-Peripheral  Gram Stain (14 Sep 2024 08:15):    Growth in aerobic and anaerobic bottles: Gram Negative Rods  Preliminary Report (14 Sep 2024 08:15):    Growth in aerobic and anaerobic bottles: Gram Negative Rods    Direct identification is available within approximately 3-5    hours either by Blood Panel Multiplexed PCR or Direct    MALDI-TOF. Details: [email or web address]  Organism: Blood Culture PCR (14 Sep 2024 10:20)  Organism: Blood Culture PCR (14 Sep 2024 10:20)      Method Type: PCR      -  K. pneumoniae group: Detec (K. pneumoniae, K. quasipneumoniae, K. variicola)        RADIOLOGY & ADDITIONAL TESTS:  < from: CT Abdomen and Pelvis w/ IV Cont (09.13.24 @ 19:35) >  IMPRESSION:  No acute finding to explain the patient's fever.    < from: X-ray Chest 1 View-PORTABLE IMMEDIATE (09.13.24 @ 18:10) >  IMPRESSION:   No radiographic evidence of active chest disease.    Care Discussed with Consultants/Other Providers:   KAILYN Dunaway discussed case and plan with Dr. Lew    Patient is a 79y old  Male who presents with a chief complaint of severe sepsis 2/2 viral vs bacterial pneumonia (13 Sep 2024 22:14)    Patient seen and examined at bedside.  S: Endorsed some dysuria and increased urinary frequency     ALLERGIES:  sulfa drugs (Unknown)    MEDICATIONS:  acetaminophen     Tablet .. 650 milliGRAM(s) Oral every 6 hours PRN  aluminum hydroxide/magnesium hydroxide/simethicone Suspension 30 milliLiter(s) Oral every 4 hours PRN  aspirin  chewable 81 milliGRAM(s) Oral daily  ezetimibe 10 milliGRAM(s) Oral daily  heparin   Injectable 5000 Unit(s) SubCutaneous every 12 hours  lactated ringers. 1000 milliLiter(s) IV Continuous <Continuous>  melatonin 3 milliGRAM(s) Oral at bedtime PRN  ondansetron Injectable 4 milliGRAM(s) IV Push every 8 hours PRN  oseltamivir 30 milliGRAM(s) Oral two times a day  piperacillin/tazobactam IVPB.. 3.375 Gram(s) IV Intermittent every 8 hours  vancomycin  IVPB 1250 milliGRAM(s) IV Intermittent every 24 hours  vancomycin  IVPB        Vital Signs Last 24 Hrs  T(F): 97.9 (14 Sep 2024 12:39), Max: 98.3 (14 Sep 2024 01:32)  HR: 81 (14 Sep 2024 12:39) (69 - 100)  BP: 102/63 (14 Sep 2024 12:39) (82/67 - 119/68)  RR: 18 (14 Sep 2024 12:39) (16 - 23)  SpO2: 96% (14 Sep 2024 12:39) (96% - 99%)  I&O's Summary    13 Sep 2024 07:01  -  14 Sep 2024 07:00  --------------------------------------------------------  IN: 0 mL / OUT: 720 mL / NET: -720 mL      PHYSICAL EXAM:  General: NAD, A/O x 3  ENT: MMM  Lungs: Clear to auscultation bilaterally   Cardio: RR, S1/S2  Abdomen: Soft, NT/ND, Normal active Bowel Sounds, abd wound w. dsg c/d/i- no surrounding cellulitis/purulent drainage noted on exam  Extremities: No cyanosis, No edema      LABS:                        10.1   7.41  )-----------( 103      ( 14 Sep 2024 06:49 )             30.6     09-14    140  |  109  |  21  ----------------------------<  119  3.6   |  21  |  1.02    Ca    7.3      14 Sep 2024 06:49  Phos  4.0     09-14  Mg     2.1     09-14    TPro  5.5  /  Alb  2.5  /  TBili  0.9  /  DBili  x   /  AST  132  /  ALT  119  /  AlkPhos  119  09-14      PT/INR - ( 13 Sep 2024 17:08 )   PT: 13.3 sec;   INR: 1.17 ratio   PTT - ( 13 Sep 2024 17:08 )  PTT:28.6 sec    Lactate, Blood: 1.2 mmol/L (09-14 @ 06:49)  Lactate, Blood: 2.8 mmol/L (09-14 @ 00:35)  Lactate, Blood: 3.0 mmol/L (09-13 @ 20:19)  Lactate, Blood: 2.4 mmol/L (09-13 @ 18:30)  Lactate, Blood: 3.6 mmol/L (09-13 @ 17:08)    CARDIAC MARKERS ( 14 Sep 2024 00:35 )  x     / 6.2 ng/L / x     / x     / x      CARDIAC MARKERS ( 13 Sep 2024 17:08 )  x     / 4.0 ng/L / x     / x     / x          Urinalysis Basic - ( 14 Sep 2024 06:49 )  Color: x / Appearance: x / SG: x / pH: x  Gluc: 119 mg/dL / Ketone: x  / Bili: x / Urobili: x   Blood: x / Protein: x / Nitrite: x   Leuk Esterase: x / RBC: x / WBC x   Sq Epi: x / Non Sq Epi: x / Bacteria: x      Culture - Blood (collected 13 Sep 2024 17:08)  Source: .Blood Blood-Peripheral  Gram Stain (14 Sep 2024 08:16):    Growth in aerobic and anaerobic bottles: Gram Negative Rods  Preliminary Report (14 Sep 2024 08:16):    Growth in aerobic and anaerobic bottles: Gram Negative Rods    Culture - Blood (collected 13 Sep 2024 17:08)  Source: .Blood Blood-Peripheral  Gram Stain (14 Sep 2024 08:15):    Growth in aerobic and anaerobic bottles: Gram Negative Rods  Preliminary Report (14 Sep 2024 08:15):    Growth in aerobic and anaerobic bottles: Gram Negative Rods    Direct identification is available within approximately 3-5    hours either by Blood Panel Multiplexed PCR or Direct    MALDI-TOF. Details: [email or web address]  Organism: Blood Culture PCR (14 Sep 2024 10:20)  Organism: Blood Culture PCR (14 Sep 2024 10:20)      Method Type: PCR      -  K. pneumoniae group: Detec (K. pneumoniae, K. quasipneumoniae, K. variicola)        RADIOLOGY & ADDITIONAL TESTS:  < from: CT Abdomen and Pelvis w/ IV Cont (09.13.24 @ 19:35) >  IMPRESSION:  No acute finding to explain the patient's fever.    < from: X-ray Chest 1 View-PORTABLE IMMEDIATE (09.13.24 @ 18:10) >  IMPRESSION:   No radiographic evidence of active chest disease.    Care Discussed with Consultants/Other Providers:   KAILYN Dunaway discussed case and plan with Dr. Lew    Patient is a 79y old  Male who presents with a chief complaint of severe sepsis 2/2 viral vs bacterial pneumonia (13 Sep 2024 22:14)    Patient seen and examined at bedside.  S: Endorsed some dysuria and increased urinary frequency     ALLERGIES:  sulfa drugs (Unknown)    MEDICATIONS:  acetaminophen     Tablet .. 650 milliGRAM(s) Oral every 6 hours PRN  aluminum hydroxide/magnesium hydroxide/simethicone Suspension 30 milliLiter(s) Oral every 4 hours PRN  aspirin  chewable 81 milliGRAM(s) Oral daily  ezetimibe 10 milliGRAM(s) Oral daily  heparin   Injectable 5000 Unit(s) SubCutaneous every 12 hours  lactated ringers. 1000 milliLiter(s) IV Continuous <Continuous>  melatonin 3 milliGRAM(s) Oral at bedtime PRN  ondansetron Injectable 4 milliGRAM(s) IV Push every 8 hours PRN  oseltamivir 30 milliGRAM(s) Oral two times a day  piperacillin/tazobactam IVPB.. 3.375 Gram(s) IV Intermittent every 8 hours  vancomycin  IVPB 1250 milliGRAM(s) IV Intermittent every 24 hours  vancomycin  IVPB        Vital Signs Last 24 Hrs  T(F): 97.9 (14 Sep 2024 12:39), Max: 98.3 (14 Sep 2024 01:32)  HR: 81 (14 Sep 2024 12:39) (69 - 100)  BP: 102/63 (14 Sep 2024 12:39) (82/67 - 119/68)  RR: 18 (14 Sep 2024 12:39) (16 - 23)  SpO2: 96% (14 Sep 2024 12:39) (96% - 99%)  I&O's Summary    13 Sep 2024 07:01  -  14 Sep 2024 07:00  --------------------------------------------------------  IN: 0 mL / OUT: 720 mL / NET: -720 mL      PHYSICAL EXAM:  General: NAD, A/O x 3  HENT: MMM, NC/AT  Lungs: Clear to auscultation bilaterally   Cardio: RR, S1/S2  Abdomen: Soft, NT/ND, Normal active Bowel Sounds, abd wound w. dsg c/d/i- no surrounding cellulitis/purulent drainage noted on exam  Extremities: No cyanosis, No edema      LABS:                        10.1   7.41  )-----------( 103      ( 14 Sep 2024 06:49 )             30.6     09-14    140  |  109  |  21  ----------------------------<  119  3.6   |  21  |  1.02    Ca    7.3      14 Sep 2024 06:49  Phos  4.0     09-14  Mg     2.1     09-14    TPro  5.5  /  Alb  2.5  /  TBili  0.9  /  DBili  x   /  AST  132  /  ALT  119  /  AlkPhos  119  09-14      PT/INR - ( 13 Sep 2024 17:08 )   PT: 13.3 sec;   INR: 1.17 ratio   PTT - ( 13 Sep 2024 17:08 )  PTT:28.6 sec    Lactate, Blood: 1.2 mmol/L (09-14 @ 06:49)  Lactate, Blood: 2.8 mmol/L (09-14 @ 00:35)  Lactate, Blood: 3.0 mmol/L (09-13 @ 20:19)  Lactate, Blood: 2.4 mmol/L (09-13 @ 18:30)  Lactate, Blood: 3.6 mmol/L (09-13 @ 17:08)    CARDIAC MARKERS ( 14 Sep 2024 00:35 )  x     / 6.2 ng/L / x     / x     / x      CARDIAC MARKERS ( 13 Sep 2024 17:08 )  x     / 4.0 ng/L / x     / x     / x          Urinalysis Basic - ( 14 Sep 2024 06:49 )  Color: x / Appearance: x / SG: x / pH: x  Gluc: 119 mg/dL / Ketone: x  / Bili: x / Urobili: x   Blood: x / Protein: x / Nitrite: x   Leuk Esterase: x / RBC: x / WBC x   Sq Epi: x / Non Sq Epi: x / Bacteria: x      Culture - Blood (collected 13 Sep 2024 17:08)  Source: .Blood Blood-Peripheral  Gram Stain (14 Sep 2024 08:16):    Growth in aerobic and anaerobic bottles: Gram Negative Rods  Preliminary Report (14 Sep 2024 08:16):    Growth in aerobic and anaerobic bottles: Gram Negative Rods    Culture - Blood (collected 13 Sep 2024 17:08)  Source: .Blood Blood-Peripheral  Gram Stain (14 Sep 2024 08:15):    Growth in aerobic and anaerobic bottles: Gram Negative Rods  Preliminary Report (14 Sep 2024 08:15):    Growth in aerobic and anaerobic bottles: Gram Negative Rods    Direct identification is available within approximately 3-5    hours either by Blood Panel Multiplexed PCR or Direct    MALDI-TOF. Details: [email or web address]  Organism: Blood Culture PCR (14 Sep 2024 10:20)  Organism: Blood Culture PCR (14 Sep 2024 10:20)      Method Type: PCR      -  K. pneumoniae group: Detec (K. pneumoniae, K. quasipneumoniae, K. variicola)        RADIOLOGY & ADDITIONAL TESTS:  < from: CT Abdomen and Pelvis w/ IV Cont (09.13.24 @ 19:35) >  IMPRESSION:  No acute finding to explain the patient's fever.    < from: X-ray Chest 1 View-PORTABLE IMMEDIATE (09.13.24 @ 18:10) >  IMPRESSION:   No radiographic evidence of active chest disease.    Care Discussed with Consultants/Other Providers:   KAILYN Dunaway discussed case and plan with Dr. Lew

## 2024-09-14 NOTE — CONSULT NOTE ADULT - SUBJECTIVE AND OBJECTIVE BOX
Chief Complaint:   78 yo male with pmhx of HTN, RCC s/p partial nephrectomy (2014), gastric cancer s/p gastrectomy (2014), bowel perforation (2019), TIA (2/2024), SVT s/p ablation, established with cardiology recommending pacemaker for sinus pauses presenting to [facility] with flu-like symptoms. Symptoms started yesterday, fever 102-103, chills, and body aches. Home COVID test was negative. Was prescribed Tamiflu earlier today after virtual urgent care visit, took one dose prior to arrival. He was not able to keep fluids down which is what prompted him to come to the ED. Denies nausea, vomiting, diarrhea, SOB, coughing, wheezing, rhinorrhea. Denies dysuria but does note decreased urine output despite receiving fluids in the ED. Urine collected appears dark yellow. Has not received flu vaccine this season. cardio eval requested by dr jack for elevation in BNP    HPI:    PMH:   HTN (hypertension)    Renal cell carcinoma, unspecified laterality    Pancreatitis    SVT (supraventricular tachycardia)    Gastric cancer      PSH:   S/P gastrectomy    S/P cholecystectomy    H/O partial nephrectomy    H/O umbilical hernia repair    S/P ACL repair    H/O shoulder surgery    History of repair of hiatal hernia    H/O knee surgery      Family History:  FAMILY HISTORY:  No pertinent family history in first degree relatives        Social History:  Smoking:  Alcohol:  Drugs:    Allergies:  sulfa drugs (Unknown)      Medications:  acetaminophen     Tablet .. 650 milliGRAM(s) Oral every 6 hours PRN  aluminum hydroxide/magnesium hydroxide/simethicone Suspension 30 milliLiter(s) Oral every 4 hours PRN  aspirin  chewable 81 milliGRAM(s) Oral daily  ezetimibe 10 milliGRAM(s) Oral daily  heparin   Injectable 5000 Unit(s) SubCutaneous every 12 hours  lactated ringers. 1000 milliLiter(s) IV Continuous <Continuous>  melatonin 3 milliGRAM(s) Oral at bedtime PRN  ondansetron Injectable 4 milliGRAM(s) IV Push every 8 hours PRN  piperacillin/tazobactam IVPB.. 3.375 Gram(s) IV Intermittent every 8 hours  vancomycin  IVPB 1250 milliGRAM(s) IV Intermittent every 24 hours  vancomycin  IVPB          REVIEW OF SYSTEMS:  CONSTITUTIONAL: No fever, weight loss, or fatigue  EYES: No eye pain, visual disturbances, or discharge  ENMT:  No difficulty hearing, tinnitus, vertigo; No sinus or throat pain  NECK: No pain or stiffness  BREASTS: No pain, masses, or nipple discharge  RESPIRATORY: No cough, wheezing, chills or hemoptysis; No shortness of breath  CARDIOVASCULAR: No chest pain, palpitations, dizziness, or leg swelling  GASTROINTESTINAL: No abdominal or epigastric pain. No nausea, vomiting, or hematemesis; No diarrhea or constipation. No melena or hematochezia.  GENITOURINARY: No dysuria, frequency, hematuria, or incontinence  NEUROLOGICAL: No headaches, memory loss, loss of strength, numbness, or tremors  SKIN: No itching, burning, rashes, or lesions   LYMPH NODES: No enlarged glands  ENDOCRINE: No heat or cold intolerance; No hair loss  MUSCULOSKELETAL: No joint pain or swelling; No muscle, back, or extremity pain  PSYCHIATRIC: No depression, anxiety, mood swings, or difficulty sleeping  HEME/LYMPH: No easy bruising, or bleeding gums  ALLERY AND IMMUNOLOGIC: No hives or eczema    Physical Exam:  T(C): 36.6 (09-14-24 @ 12:39), Max: 36.8 (09-14-24 @ 01:32)  HR: 81 (09-14-24 @ 12:39) (69 - 85)  BP: 102/63 (09-14-24 @ 12:39) (82/67 - 119/68)  RR: 18 (09-14-24 @ 12:39) (16 - 23)  SpO2: 96% (09-14-24 @ 12:39) (96% - 97%)  Wt(kg): --    GENERAL: NAD, well-groomed, well-developed  HEAD:  Atraumatic, Normocephalic  EYES: EOMI, conjunctiva and sclera clear  ENT: Moist mucous membranes,  NECK: Supple, No JVD, no bruits  CHEST/LUNG: Clear to percussion bilaterally; No rales, rhonchi, wheezing, or rubs  HEART: Regular rate and rhythm; No murmurs, rubs, or gallops PMI non displaced.  ABDOMEN: Soft, Nontender, Nondistended; Bowel sounds present  EXTREMITIES:  2+ Peripheral Pulses, No clubbing, cyanosis, or edema  SKIN: No rashes or lesions  NERVOUS SYSTEM:  Cranial Nerves II-XII intact    Cardiovascular Diagnostic Testing:  ECG:    < from: 12 Lead ECG (09.13.24 @ 17:12) >  Diagnosis Line Normal sinus rhythm  Poor R wave progression  Left anterior fascicular block  Cannot rule out Anterior infarct , age undetermined  Abnormal ECG  When compared with ECG of 18-MAY-2018 05:05,  Minimal criteria for Anterior infarct are now present  Confirmed by MARGI OAKES MD ([id number]) on 9/14/2024 9:17:51 AM    < end of copied text >      ECHO:    < from: TTE Echo Complete w/o Contrast w/ Doppler (09.14.24 @ 08:29) >  Summary:   1. Left ventricular ejection fraction, by visual estimation, is 60 to   65%.   2. Normal global left ventricular systolic function.   3. Mildly increased LV wall thickness.   4. Spectral Doppler shows impaired relaxation pattern of left   ventricular myocardial filling (Grade I diastolic dysfunction).   5. There is mild concentric left ventricular hypertrophy.   6. Normal right ventricular size and function.   7. The left atrium is normal in size.   8. The right atrium is normal in size.   9. Mild mitral annular calcification.  10. Mild thickening of the anterior and posterior mitral valve leaflets.  11. Trace mitral valve regurgitation.  12. Structurally normal tricuspid valve, with normal leaflet excursion.  13. Normal trileaflet aortic valve with normal opening.  14. Sclerotic aortic valve with normal opening.  15. Structurally normal pulmonic valve, with normal leaflet excursion.  16. The main pulmonary artery is normal in size.    [id number] Margi Oakes , Electronically signed on 9/14/2024 at   1:52:59 PM        *** Final ***    < end of copied text >      Labs:                        10.1   7.41  )-----------( 103      ( 14 Sep 2024 06:49 )             30.6     09-14    140  |  109<H>  |  21  ----------------------------<  119<H>  3.6   |  21<L>  |  1.02    Ca    7.3<L>      14 Sep 2024 06:49  Phos  4.0     09-14  Mg     2.1     09-14    TPro  5.5<L>  /  Alb  2.5<L>  /  TBili  0.9  /  DBili  x   /  AST  132<H>  /  ALT  119<H>  /  AlkPhos  119  09-14    PT/INR - ( 13 Sep 2024 17:08 )   PT: 13.3 sec;   INR: 1.17 ratio         PTT - ( 13 Sep 2024 17:08 )  PTT:28.6 sec        Imaging:    impression   gram neg sepsis and elevation in BNP  Hemoglobin 10.1 hematocrit 31 calcium 7.3 EKG normal sinus rhythm left anterior fascicular block blood cultures gram-negative rods albumin 2.5 SGPT 111 lactate 3.6 now 1.2 troponin/6.2 CT abdomen negative gram-negative rods   79 yo  with history of esophagitis gastric cancer hyperlipidemia hypertension renal cell cancer small bowel obstruction SVT partial nephrectomy umbilical hernia colonoscopy cholecystectomy hiatal hernia repair quit smoking 46 years ago esophageal dysphagia    gram neg rods point to GI or  source. From a cardiac standpoint SVT under controlled. patient plans to undergo pacer with Dr. Schultz for  3-second pause with sure clearance of infection prior to device implant Elevation in BNP may reflect a fluid avid state such as a low albumin . echo done recently evaluate LV function shows evidence for diastolic dysfunction . ? consider Farxiga if renal indices allow.     discussed with wife at bedside was supportive.   Chief Complaint:   80 yo male with pmhx of HTN, RCC s/p partial nephrectomy (2014), gastric cancer s/p gastrectomy (2014), bowel perforation (2019), TIA (2/2024), SVT s/p ablation, established with cardiology recommending pacemaker for sinus pauses presenting to [facility] with flu-like symptoms. Symptoms started yesterday, fever 102-103, chills, and body aches. Home COVID test was negative. Was prescribed Tamiflu earlier today after virtual urgent care visit, took one dose prior to arrival. He was not able to keep fluids down which is what prompted him to come to the ED. Denies nausea, vomiting, diarrhea, SOB, coughing, wheezing, rhinorrhea. Denies dysuria but does note decreased urine output despite receiving fluids in the ED. Urine collected appears dark yellow. Has not received flu vaccine this season. cardio eval requested by dr jack for elevation in BNP    HPI:    PMH:   HTN (hypertension)    Renal cell carcinoma, unspecified laterality    Pancreatitis    SVT (supraventricular tachycardia)    Gastric cancer      PSH:   S/P gastrectomy    S/P cholecystectomy    H/O partial nephrectomy    H/O umbilical hernia repair    S/P ACL repair    H/O shoulder surgery    History of repair of hiatal hernia    H/O knee surgery      Family History:  FAMILY HISTORY:  No pertinent family history in first degree relatives        Social History:  Smoking:  Alcohol:  Drugs:    Allergies:  sulfa drugs (Unknown)      Medications:  acetaminophen     Tablet .. 650 milliGRAM(s) Oral every 6 hours PRN  aluminum hydroxide/magnesium hydroxide/simethicone Suspension 30 milliLiter(s) Oral every 4 hours PRN  aspirin  chewable 81 milliGRAM(s) Oral daily  ezetimibe 10 milliGRAM(s) Oral daily  heparin   Injectable 5000 Unit(s) SubCutaneous every 12 hours  lactated ringers. 1000 milliLiter(s) IV Continuous <Continuous>  melatonin 3 milliGRAM(s) Oral at bedtime PRN  ondansetron Injectable 4 milliGRAM(s) IV Push every 8 hours PRN  piperacillin/tazobactam IVPB.. 3.375 Gram(s) IV Intermittent every 8 hours  vancomycin  IVPB 1250 milliGRAM(s) IV Intermittent every 24 hours  vancomycin  IVPB          REVIEW OF SYSTEMS:  CONSTITUTIONAL: No fever, weight loss, or fatigue  EYES: No eye pain, visual disturbances, or discharge  ENMT:  No difficulty hearing, tinnitus, vertigo; No sinus or throat pain  NECK: No pain or stiffness  BREASTS: No pain, masses, or nipple discharge  RESPIRATORY: No cough, wheezing, chills or hemoptysis; No shortness of breath  CARDIOVASCULAR: No chest pain, palpitations, dizziness, or leg swelling  GASTROINTESTINAL: No abdominal or epigastric pain. No nausea, vomiting, or hematemesis; No diarrhea or constipation. No melena or hematochezia.  GENITOURINARY: No dysuria, frequency, hematuria, or incontinence  NEUROLOGICAL: No headaches, memory loss, loss of strength, numbness, or tremors  SKIN: No itching, burning, rashes, or lesions   LYMPH NODES: No enlarged glands  ENDOCRINE: No heat or cold intolerance; No hair loss  MUSCULOSKELETAL: No joint pain or swelling; No muscle, back, or extremity pain  PSYCHIATRIC: No depression, anxiety, mood swings, or difficulty sleeping  HEME/LYMPH: No easy bruising, or bleeding gums  ALLERY AND IMMUNOLOGIC: No hives or eczema    Physical Exam:  T(C): 36.6 (09-14-24 @ 12:39), Max: 36.8 (09-14-24 @ 01:32)  HR: 81 (09-14-24 @ 12:39) (69 - 85)  BP: 102/63 (09-14-24 @ 12:39) (82/67 - 119/68)  RR: 18 (09-14-24 @ 12:39) (16 - 23)  SpO2: 96% (09-14-24 @ 12:39) (96% - 97%)  Wt(kg): --    GENERAL: NAD, well-groomed, well-developed  HEAD:  Atraumatic, Normocephalic  EYES: EOMI, conjunctiva and sclera clear  ENT: Moist mucous membranes,  NECK: Supple, No JVD, no bruits  CHEST/LUNG: Clear to percussion bilaterally; No rales, rhonchi, wheezing, or rubs  HEART: Regular rate and rhythm; No murmurs, rubs, or gallops PMI non displaced.  ABDOMEN: Soft, Nontender, Nondistended; Bowel sounds present  EXTREMITIES:  2+ Peripheral Pulses, No clubbing, cyanosis, or edema  SKIN: No rashes or lesions  NERVOUS SYSTEM:  Cranial Nerves II-XII intact    Cardiovascular Diagnostic Testing:  ECG:    < from: 12 Lead ECG (09.13.24 @ 17:12) >  Diagnosis Line Normal sinus rhythm  Poor R wave progression  Left anterior fascicular block  Cannot rule out Anterior infarct , age undetermined  Abnormal ECG  When compared with ECG of 18-MAY-2018 05:05,  Minimal criteria for Anterior infarct are now present  Confirmed by MARGI OAKES MD ([id number]) on 9/14/2024 9:17:51 AM    < end of copied text >      ECHO:    < from: TTE Echo Complete w/o Contrast w/ Doppler (09.14.24 @ 08:29) >  Summary:   1. Left ventricular ejection fraction, by visual estimation, is 60 to   65%.   2. Normal global left ventricular systolic function.   3. Mildly increased LV wall thickness.   4. Spectral Doppler shows impaired relaxation pattern of left   ventricular myocardial filling (Grade I diastolic dysfunction).   5. There is mild concentric left ventricular hypertrophy.   6. Normal right ventricular size and function.   7. The left atrium is normal in size.   8. The right atrium is normal in size.   9. Mild mitral annular calcification.  10. Mild thickening of the anterior and posterior mitral valve leaflets.  11. Trace mitral valve regurgitation.  12. Structurally normal tricuspid valve, with normal leaflet excursion.  13. Normal trileaflet aortic valve with normal opening.  14. Sclerotic aortic valve with normal opening.  15. Structurally normal pulmonic valve, with normal leaflet excursion.  16. The main pulmonary artery is normal in size.    [id number] Margi Oakes , Electronically signed on 9/14/2024 at   1:52:59 PM        *** Final ***    < end of copied text >      Labs:                        10.1   7.41  )-----------( 103      ( 14 Sep 2024 06:49 )             30.6     09-14    140  |  109<H>  |  21  ----------------------------<  119<H>  3.6   |  21<L>  |  1.02    Ca    7.3<L>      14 Sep 2024 06:49  Phos  4.0     09-14  Mg     2.1     09-14    TPro  5.5<L>  /  Alb  2.5<L>  /  TBili  0.9  /  DBili  x   /  AST  132<H>  /  ALT  119<H>  /  AlkPhos  119  09-14    PT/INR - ( 13 Sep 2024 17:08 )   PT: 13.3 sec;   INR: 1.17 ratio         PTT - ( 13 Sep 2024 17:08 )  PTT:28.6 sec        Imaging:    impression   gram neg sepsis and elevation in BNP 1292 hfpef  Hemoglobin 10.1 hematocrit 31 calcium 7.3 EKG normal sinus rhythm left anterior fascicular block blood cultures gram-negative rods albumin 2.5 SGPT 111 lactate 3.6 now 1.2 troponin/6.2 CT abdomen negative gram-negative rods cxr -  79 yo  with history of esophagitis gastric cancer hyperlipidemia hypertension renal cell cancer small bowel obstruction SVT partial nephrectomy umbilical hernia colonoscopy cholecystectomy hiatal hernia repair quit smoking 46 years ago esophageal dysphagia    gram neg rods point to GI or  source. From a cardiac standpoint SVT under controlled. patient plans to undergo pacer with Dr. Schultz for  3-second pause with sure clearance of infection prior to device implant Elevation in BNP may reflect a fluid avid state such as a low albumin . echo done recently evaluate LV function shows evidence for diastolic dysfunction . ? consider Farxiga if renal indices allow.     discussed with wife at bedside was supportive.

## 2024-09-14 NOTE — PROGRESS NOTE ADULT - ASSESSMENT
78 yo male with pmhx of HTN, RCC s/p partial nephrectomy (2014), gastric cancer s/p gastrectomy (2014), bowel perforation (2019), TIA (2/2024), SVT s/p ablation, established with cardiology recommending pacemaker for sinus pauses presenting to [facility] with flu-like symptoms admitted for sepsis secondary to bacterial vs viral pneumonia.    *Severe sepsis bacterial vs viral pneumonia  *Hypotensive briefly w/ MAP <60  *Elevated Lactate  *Dehydration  -VS T98.1, , /70, RR18, SpO2 97% RA  -Pt reports fever 102-103 at home  -WBC 5 > 10, Lactate 3.6, Procal 40  -Flu/covid/RSV neg  -CXR and CTAP unremarkable  -S/p 2200cc NS in ED  -Additional 1000cc NS given  -S/p levofloxacin in ED  -Started on Vanc/Zosyn (hx of MRSA)- Continue   - d/c Tamiflu, as RVP Neg  -f/u UCx   -f/up BldCx: +GNR- Kleb Pna   -f/u wound culture from drainage at abdominal site  -Lactate downtrended to 1.2  -ID consulted     *Hyponatremia  *Hypophosphatemia  *Hypomagnesemia  - Improved   - Trend Lytes    *Transaminitis   -Likely due to sepsis but also consider congestive hepatopathy  -,   -CT unremarkable  -F/u RUQ sonogram    *Elevated proBNP  -BNP 1292  -f/up TTE   -Trop neg  -Cards consulted    *Decreased urine output  -Likely 2/2 dehydration  -Bladder scan 200cc    *HTN  -Hold home amlodipine due to low BPs    *TIA  -Cont home Aspirin and ezetimibe     *DVT ppx  On Heparin    GOC/Code Status: Full Code     Dispo: Pending clinical course as above.  78 yo male with pmhx of HTN, RCC s/p partial nephrectomy (2014), gastric cancer s/p gastrectomy (2014), bowel perforation (2019), TIA (2/2024), SVT s/p ablation, established with cardiology recommending pacemaker for sinus pauses presenting to [facility] with flu-like symptoms admitted for sepsis secondary to bacterial vs viral pneumonia.    *Severe sepsis bacterial vs viral pneumonia  *Hypotensive briefly w/ MAP <60  *Elevated Lactate  *Dehydration  -VS T98.1, , /70, RR18, SpO2 97% RA  -Pt reports fever 102-103 at home  -WBC 5 > 10, Lactate 3.6, Procal 40  -Flu/covid/RSV neg  -CXR and CTAP unremarkable  -S/p 2200cc NS in ED  -Additional 1000cc NS given  -S/p levofloxacin in ED  -Started on Vanc/Zosyn (hx of MRSA)- Continue   - d/c Tamiflu, as RVP Neg  -f/u UCx   -f/up BldCx: +GNR- Kleb Pna   -f/u wound culture from drainage at abdominal site- pt reports site w. poor healing since 2019 surgical procedure   -Lactate downtrended to 1.2  -ID consulted     *Hyponatremia  *Hypophosphatemia  *Hypomagnesemia  - Improved   - Trend Lytes    *Transaminitis   -Likely due to sepsis but also consider congestive hepatopathy  -,   -CT unremarkable  -F/u RUQ sonogram    *Elevated proBNP  -BNP 1292  -f/up TTE   -Trop neg  -Cards consulted    *Decreased urine output  -Likely 2/2 dehydration  -Bladder scan 200cc    *HTN  -Hold home amlodipine due to low BPs    *TIA  -Cont home Aspirin and ezetimibe     *DVT ppx  On Heparin    GOC/Code Status: Full Code     Dispo: Pending clinical course as above.  80 yo male with pmhx of HTN, RCC s/p partial nephrectomy (2014), gastric cancer s/p gastrectomy (2014), bowel perforation (2019), TIA (2/2024), SVT s/p ablation, established with cardiology recommending pacemaker for sinus pauses presenting to [facility] with flu-like symptoms admitted for sepsis secondary to bacterial vs viral pneumonia.    *Severe sepsis possibly 2/2 UTI  *Kleb Bacteremia  *Hypotensive briefly w/ MAP <60  *Elevated Lactate  *Dehydration  -VS T98.1, , /70, RR18, SpO2 97% RA  -Pt reports fever 102-103 at home  -WBC 5 > 10, Lactate 3.6, Procal 40  -Flu/covid/RSV neg  -CXR and CTAP unremarkable  -S/p 2200cc NS in ED  -Additional 1000cc NS given  -S/p levofloxacin in ED  -Started on Vanc/Zosyn (hx of MRSA)- Continue   - d/c Tamiflu, as RVP Neg  -f/u UCx   -f/up BldCx: +GNR- Kleb Pna   -f/u wound culture from drainage at abdominal site- pt reports site w. poor healing since 2019 surgical procedure   -Lactate downtrended to 1.2  -ID consulted     *Hyponatremia  *Hypophosphatemia  *Hypomagnesemia  - Improved   - Trend Lytes    *Transaminitis   -Likely due to sepsis but also consider congestive hepatopathy  -,   -CT unremarkable  -F/u RUQ sonogram    *Elevated proBNP  -BNP 1292  -f/up TTE   -Trop neg  -Cards consulted    *Decreased urine output  -Likely 2/2 dehydration  -Bladder scan 200cc    *HTN  -Hold home amlodipine due to low BPs    *TIA  -Cont home Aspirin and ezetimibe     *DVT ppx  On Heparin    GOC/Code Status: Full Code   Updated pt and wife 9/14  Dispo: Pending clinical course as above.  78 yo male with pmhx of HTN, RCC s/p partial nephrectomy (2014), gastric cancer s/p gastrectomy (2014), bowel perforation (2019), TIA (2/2024), SVT s/p ablation, established with cardiology recommending pacemaker for sinus pauses presenting to [facility] with flu-like symptoms admitted for sepsis secondary to bacterial vs viral pneumonia.    *Severe sepsis possibly 2/2 UTI  *Kleb Bacteremia  *Hypotensive briefly w/ MAP <60  *Elevated Lactate resolved  *Dehydration  -VS T98.1, , /70, RR18, SpO2 97% RA  -Pt reports fever 102-103 at home  -WBC 5 > 10, Lactate 3.6, Procal 40  -Flu/covid/RSV neg  -CXR and CTAP unremarkable  -S/p 2200cc NS in ED  -Additional 1000cc NS given  -S/p levofloxacin in ED  -Started on Vanc/Zosyn (hx of MRSA)- Continue   - d/c Tamiflu, as RVP Neg  -f/u UCx   -f/up BldCx: +GNR- Kleb Pna   -f/u wound culture from drainage at abdominal site- pt reports site w. poor healing since 2019 surgical procedure   -Lactate downtrended to 1.2  -ID consulted     *Hyponatremia  *Hypophosphatemia  *Hypomagnesemia  - all resolved     *Transaminitis   -Likely due to sepsis but also consider congestive hepatopathy  -,   -CT unremarkable  -F/u RUQ sonogram    *Elevated proBNP  -BNP 1292  -f/up TTE   -Trop neg  -Cards consulted    *Decreased urine output  -Likely 2/2 dehydration  -Bladder scan 200cc    *HTN  -Hold home amlodipine due to low BPs    *Hx of TIA  -Cont home Aspirin and ezetimibe     *DVT ppx  On Heparin    GOC/Code Status: Full Code   Updated pt and wife 9/14  Dispo: Pending clinical course as above.  80 yo male with pmhx of HTN, RCC s/p partial nephrectomy (2014), gastric cancer s/p gastrectomy (2014), bowel perforation (2019), TIA (2/2024), SVT s/p ablation, established with cardiology recommending pacemaker for sinus pauses presenting to [facility] with flu-like symptoms admitted for sepsis- source unkn.     *Severe sepsis possibly 2/2 UTI  *Kleb Bacteremia  *Hypotensive briefly w/ MAP <60  *Elevated Lactate resolved  *Dehydration  -VS T98.1, , /70, RR18, SpO2 97% RA  -Pt reports fever 102-103 at home  -WBC 5 > 10, Lactate 3.6, Procal 40  -Flu/covid/RSV neg  -CXR and CTAP unremarkable  -S/p 2200cc NS in ED  -Additional 1000cc NS given  -S/p levofloxacin in ED  -Started on Vanc/Zosyn (hx of MRSA)- Continue   - d/c Tamiflu, as RVP Neg  -f/u UCx   -f/up BldCx: +GNR- Kleb Pna   -f/u wound culture from drainage at abdominal site- pt reports site w. poor healing since 2019 surgical procedure   -Lactate downtrended to 1.2  -ID consulted     *Hyponatremia  *Hypophosphatemia  *Hypomagnesemia  - all resolved     *Transaminitis   -Likely due to sepsis but also consider congestive hepatopathy  -,   -CT unremarkable  -F/u RUQ sonogram    *Elevated proBNP  -BNP 1292  -f/up TTE   -Trop neg  -Cards consulted    *Decreased urine output  -Likely 2/2 dehydration  -Bladder scan 200cc    *HTN  -Hold home amlodipine due to low BPs    *Hx of TIA  -Cont home Aspirin and ezetimibe     *DVT ppx  On Heparin    GOC/Code Status: Full Code   Updated pt and wife 9/14  Dispo: Pending clinical course as above.

## 2024-09-15 LAB
-  AMPICILLIN/SULBACTAM: SIGNIFICANT CHANGE UP
-  AMPICILLIN: SIGNIFICANT CHANGE UP
-  AZTREONAM: SIGNIFICANT CHANGE UP
-  CEFAZOLIN: SIGNIFICANT CHANGE UP
-  CEFEPIME: SIGNIFICANT CHANGE UP
-  CEFOXITIN: SIGNIFICANT CHANGE UP
-  CEFTRIAXONE: SIGNIFICANT CHANGE UP
-  CIPROFLOXACIN: SIGNIFICANT CHANGE UP
-  ERTAPENEM: SIGNIFICANT CHANGE UP
-  GENTAMICIN: SIGNIFICANT CHANGE UP
-  IMIPENEM: SIGNIFICANT CHANGE UP
-  LEVOFLOXACIN: SIGNIFICANT CHANGE UP
-  MEROPENEM: SIGNIFICANT CHANGE UP
-  PIPERACILLIN/TAZOBACTAM: SIGNIFICANT CHANGE UP
-  TOBRAMYCIN: SIGNIFICANT CHANGE UP
-  TRIMETHOPRIM/SULFAMETHOXAZOLE: SIGNIFICANT CHANGE UP
ALBUMIN SERPL ELPH-MCNC: 2.4 G/DL — LOW (ref 3.3–5)
ALP SERPL-CCNC: 114 U/L — SIGNIFICANT CHANGE UP (ref 40–120)
ALT FLD-CCNC: 80 U/L — HIGH (ref 10–45)
ANION GAP SERPL CALC-SCNC: 9 MMOL/L — SIGNIFICANT CHANGE UP (ref 5–17)
AST SERPL-CCNC: 57 U/L — HIGH (ref 10–40)
BILIRUB SERPL-MCNC: 0.4 MG/DL — SIGNIFICANT CHANGE UP (ref 0.2–1.2)
BUN SERPL-MCNC: 19 MG/DL — SIGNIFICANT CHANGE UP (ref 7–23)
CALCIUM SERPL-MCNC: 7.8 MG/DL — LOW (ref 8.4–10.5)
CHLORIDE SERPL-SCNC: 109 MMOL/L — HIGH (ref 96–108)
CO2 SERPL-SCNC: 22 MMOL/L — SIGNIFICANT CHANGE UP (ref 22–31)
CREAT SERPL-MCNC: 1.02 MG/DL — SIGNIFICANT CHANGE UP (ref 0.5–1.3)
CULTURE RESULTS: ABNORMAL
CULTURE RESULTS: ABNORMAL
EGFR: 74 ML/MIN/1.73M2 — SIGNIFICANT CHANGE UP
GLUCOSE SERPL-MCNC: 91 MG/DL — SIGNIFICANT CHANGE UP (ref 70–99)
HCT VFR BLD CALC: 31 % — LOW (ref 39–50)
HGB BLD-MCNC: 10.6 G/DL — LOW (ref 13–17)
MAGNESIUM SERPL-MCNC: 2.3 MG/DL — SIGNIFICANT CHANGE UP (ref 1.6–2.6)
MCHC RBC-ENTMCNC: 30.5 PG — SIGNIFICANT CHANGE UP (ref 27–34)
MCHC RBC-ENTMCNC: 34.2 GM/DL — SIGNIFICANT CHANGE UP (ref 32–36)
MCV RBC AUTO: 89.3 FL — SIGNIFICANT CHANGE UP (ref 80–100)
METHOD TYPE: SIGNIFICANT CHANGE UP
NRBC # BLD: 0 /100 WBCS — SIGNIFICANT CHANGE UP (ref 0–0)
ORGANISM # SPEC MICROSCOPIC CNT: ABNORMAL
ORGANISM # SPEC MICROSCOPIC CNT: ABNORMAL
ORGANISM # SPEC MICROSCOPIC CNT: SIGNIFICANT CHANGE UP
PHOSPHATE SERPL-MCNC: 3.3 MG/DL — SIGNIFICANT CHANGE UP (ref 2.5–4.5)
PLATELET # BLD AUTO: 95 K/UL — LOW (ref 150–400)
POTASSIUM SERPL-MCNC: 3.8 MMOL/L — SIGNIFICANT CHANGE UP (ref 3.5–5.3)
POTASSIUM SERPL-SCNC: 3.8 MMOL/L — SIGNIFICANT CHANGE UP (ref 3.5–5.3)
PROT SERPL-MCNC: 5.4 G/DL — LOW (ref 6–8.3)
RBC # BLD: 3.47 M/UL — LOW (ref 4.2–5.8)
RBC # FLD: 15.8 % — HIGH (ref 10.3–14.5)
SODIUM SERPL-SCNC: 140 MMOL/L — SIGNIFICANT CHANGE UP (ref 135–145)
SPECIMEN SOURCE: SIGNIFICANT CHANGE UP
SPECIMEN SOURCE: SIGNIFICANT CHANGE UP
VANCOMYCIN TROUGH SERPL-MCNC: 7.2 UG/ML — LOW (ref 10–20)
WBC # BLD: 6.13 K/UL — SIGNIFICANT CHANGE UP (ref 3.8–10.5)
WBC # FLD AUTO: 6.13 K/UL — SIGNIFICANT CHANGE UP (ref 3.8–10.5)

## 2024-09-15 PROCEDURE — 99233 SBSQ HOSP IP/OBS HIGH 50: CPT

## 2024-09-15 RX ORDER — VANCOMYCIN/0.9 % SOD CHLORIDE 1.75G/25
1250 PLASTIC BAG, INJECTION (ML) INTRAVENOUS EVERY 24 HOURS
Refills: 0 | Status: DISCONTINUED | OUTPATIENT
Start: 2024-09-15 | End: 2024-09-15

## 2024-09-15 RX ADMIN — Medication 81 MILLIGRAM(S): at 11:10

## 2024-09-15 RX ADMIN — EZETIMIBE 10 MILLIGRAM(S): 10 TABLET ORAL at 21:23

## 2024-09-15 RX ADMIN — Medication 2000 MILLIGRAM(S): at 20:26

## 2024-09-15 RX ADMIN — Medication 5000 UNIT(S): at 05:54

## 2024-09-15 RX ADMIN — PIPERACILLIN SODIUM AND TAZOBACTAM SODIUM 25 GRAM(S): 3; .375 INJECTION, POWDER, FOR SOLUTION INTRAVENOUS at 05:52

## 2024-09-15 RX ADMIN — Medication 70 MILLILITER(S): at 05:52

## 2024-09-15 RX ADMIN — PIPERACILLIN SODIUM AND TAZOBACTAM SODIUM 25 GRAM(S): 3; .375 INJECTION, POWDER, FOR SOLUTION INTRAVENOUS at 14:00

## 2024-09-15 RX ADMIN — Medication 5000 UNIT(S): at 17:42

## 2024-09-15 NOTE — PROGRESS NOTE ADULT - SUBJECTIVE AND OBJECTIVE BOX
Patient is a 79y old  Male who presents with a chief complaint of severe sepsis 2/2 viral vs bacterial pneumonia (14 Sep 2024 17:45)    Patient seen and examined at bedside.  S: Denies new complaints. No f/c.     ALLERGIES:  sulfa drugs (Unknown)    MEDICATIONS:  acetaminophen     Tablet .. 650 milliGRAM(s) Oral every 6 hours PRN  aluminum hydroxide/magnesium hydroxide/simethicone Suspension 30 milliLiter(s) Oral every 4 hours PRN  aspirin  chewable 81 milliGRAM(s) Oral daily  ezetimibe 10 milliGRAM(s) Oral daily  heparin   Injectable 5000 Unit(s) SubCutaneous every 12 hours  lactated ringers. 1000 milliLiter(s) IV Continuous <Continuous>  melatonin 3 milliGRAM(s) Oral at bedtime PRN  ondansetron Injectable 4 milliGRAM(s) IV Push every 8 hours PRN  piperacillin/tazobactam IVPB.. 3.375 Gram(s) IV Intermittent every 8 hours  vancomycin  IVPB 1250 milliGRAM(s) IV Intermittent every 24 hours    Vital Signs Last 24 Hrs  T(F): 98.1 (15 Sep 2024 05:13), Max: 98.9 (14 Sep 2024 19:55)  HR: 59 (15 Sep 2024 05:13) (59 - 81)  BP: 116/71 (15 Sep 2024 05:13) (99/60 - 116/71)  RR: 17 (15 Sep 2024 05:13) (16 - 18)  SpO2: 93% (15 Sep 2024 05:13) (90% - 96%)  I&O's Summary      PHYSICAL EXAM:  General: NAD, A/O x 3  HENT: MMM, NC/AT  Lungs: Clear to auscultation bilaterally   Cardio: RR, S1/S2  Abdomen: Soft, NT/ND, Normal active Bowel Sounds, abd wound w. dsg with strikethrough drainage- no surrounding cellulitis/purulent drainage noted on exam  Extremities: No cyanosis, No edema      LABS:                        10.6   6.13  )-----------( 95       ( 15 Sep 2024 05:55 )             31.0     09-15    140  |  109  |  19  ----------------------------<  91  3.8   |  22  |  1.02    Ca    7.8      15 Sep 2024 05:55  Phos  3.3     09-15  Mg     2.3     09-15    TPro  5.4  /  Alb  2.4  /  TBili  0.4  /  DBili  x   /  AST  57  /  ALT  80  /  AlkPhos  114  09-15      PT/INR - ( 13 Sep 2024 17:08 )   PT: 13.3 sec;   INR: 1.17 ratio    PTT - ( 13 Sep 2024 17:08 )  PTT:28.6 sec    Lactate, Blood: 1.2 mmol/L (09-14 @ 06:49)  Lactate, Blood: 2.8 mmol/L (09-14 @ 00:35)  Lactate, Blood: 3.0 mmol/L (09-13 @ 20:19)  Lactate, Blood: 2.4 mmol/L (09-13 @ 18:30)  Lactate, Blood: 3.6 mmol/L (09-13 @ 17:08)    CARDIAC MARKERS ( 14 Sep 2024 00:35 )  x     / 6.2 ng/L / x     / x     / x      CARDIAC MARKERS ( 13 Sep 2024 17:08 )  x     / 4.0 ng/L / x     / x     / x          Urinalysis Basic - ( 15 Sep 2024 05:55 )  Color: x / Appearance: x / SG: x / pH: x  Gluc: 91 mg/dL / Ketone: x  / Bili: x / Urobili: x   Blood: x / Protein: x / Nitrite: x   Leuk Esterase: x / RBC: x / WBC x   Sq Epi: x / Non Sq Epi: x / Bacteria: x    Culture - Urine (collected 13 Sep 2024 19:02)  Source: Clean Catch Clean Catch (Midstream)  Final Report (14 Sep 2024 22:29):    <10,000 CFU/mL Normal Urogenital Dawn    Culture - Blood (collected 13 Sep 2024 17:08)  Source: .Blood Blood-Peripheral  Gram Stain (14 Sep 2024 08:16):    Growth in aerobic and anaerobic bottles: Gram Negative Rods  Preliminary Report (14 Sep 2024 20:38):    Growth in aerobic and anaerobic bottles: Klebsiella pneumoniae    See previous culture [id number]    Culture - Blood (collected 13 Sep 2024 17:08)  Source: .Blood Blood-Peripheral  Gram Stain (14 Sep 2024 08:15):    Growth in aerobic and anaerobic bottles: Gram Negative Rods  Preliminary Report (14 Sep 2024 20:37):    Growth in aerobic and anaerobic bottles: Klebsiella pneumoniae    Direct identification is available within approximately 3-5    hours either by Blood Panel Multiplexed PCR or Direct    MALDI-TOF. Details: [email or web address]  Organism: Blood Culture PCR (14 Sep 2024 10:20)  Organism: Blood Culture PCR (14 Sep 2024 10:20)      Method Type: PCR      -  K. pneumoniae group: Detec (K. pneumoniae, K. quasipneumoniae, K. variicola)    Radiology:   < from: US Abdomen Limited (09.14.24 @ 14:46) >  Limited visualization of the pancreas, otherwise unremarkable right upper   quadrant ultrasound.    < from: TTE Echo Complete w/o Contrast w/ Doppler (09.14.24 @ 08:29) >   1. Left ventricular ejection fraction, by visual estimation, is 60 to   65%.   2. Normal global left ventricular systolic function.   3. Mildly increased LV wall thickness.   4. Spectral Doppler shows impaired relaxation pattern of left   ventricular myocardial filling (Grade I diastolic dysfunction).   5. There is mild concentric left ventricular hypertrophy.   6. Normal right ventricular size and function.   7. The left atrium is normal in size.   8. The right atrium is normal in size.   9. Mild mitral annular calcification.  10. Mild thickening of the anterior and posterior mitral valve leaflets.  11. Trace mitral valve regurgitation.  12. Structurally normal tricuspid valve, with normal leaflet excursion.  13. Normal trileaflet aortic valve with normal opening.  14. Sclerotic aortic valve with normal opening.  15. Structurally normal pulmonic valve, with normal leaflet excursion.  16. The main pulmonary artery is normal in size.          Care Discussed with Consultants/Other Providers:   KAILYN Dunaway discussed case and plan with Dr. Lew

## 2024-09-15 NOTE — PHARMACOTHERAPY INTERVENTION NOTE - COMMENTS
Recommended switching from piperacillin/Tazobactam to ceftriaxone IV 2g q24hrs in the setting of K. pneumoniae bacteremia susceptible to ceftriaxone S <=1.    Romana Kaplan, PharmD  Clinical Pharmacy Specialist, Infectious Diseases  Tele-Antimicrobial Stewardship Program (Tele-ASP)  Tele-ASP Phone: [phone] 
Recommended discontinuing vancomycin in the setting of Klebsiella pneumoniae bacteremia. Patient is currently being treated with ceftriaxone.    Romana Kaplan, Mirna  Clinical Pharmacy Specialist, Infectious Diseases  Tele-Antimicrobial Stewardship Program (Tele-ASP)  Tele-ASP Phone: [phone]

## 2024-09-15 NOTE — PROGRESS NOTE ADULT - ASSESSMENT
80 yo male with pmhx of HTN, RCC s/p partial nephrectomy (2014), gastric cancer s/p gastrectomy (2014), bowel perforation (2019), TIA (2/2024), SVT s/p ablation, established with cardiology recommending pacemaker for sinus pauses presenting to [facility] with flu-like symptoms admitted for sepsis- source unkn.     *Severe sepsis possibly 2/2  vs GI Source   *Kleb Bacteremia  *Hypotensive briefly w/ MAP <60  *Elevated Lactate resolved  *Dehydration  -VS T98.1, , /70, RR18, SpO2 97% RA  -Pt reports fever 102-103 at home  -WBC 5 > 10, Lactate 3.6, Procal 40  -Flu/covid/RSV neg  -CXR and CTAP unremarkable  -S/p 2200cc NS in ED  -Additional 1000cc NS given  -S/p levofloxacin in ED  -Started on Vanc/Zosyn (hx of MRSA)- Continue   - d/c Tamiflu, as RVP Neg  -Lactate downtrended to 1.2  -UCx: Neg  -f/up BldCx: +GNR- Kleb Pna   -f/u wound culture from drainage at abdominal site- pt reports site w. poor healing since 2019 surgical procedure (p/s has followed up outpt w. Surgery and ID- was informed wound would always be open so long mesh in place)  -ID consulted   -f/up repeat 9.15 BldCxs for clearance (will send this evening)    *Hyponatremia  *Hypophosphatemia  *Hypomagnesemia  - all resolved     *Transaminitis- improving   -Likely due to sepsis but also consider congestive hepatopathy  -,   -CT unremarkable  -RUQ sonogram- unremarkable as above     *Elevated proBNP  -BNP 1292  -9.14 TTE EF 60-65%, grade I diastolic dysfunction   -Trop neg  -Cards consulted    *Decreased urine output  -Likely 2/2 dehydration  -Bladder scan 200cc    *HTN  -Hold home Amlodipine due to low BPs- resume as appropriate     *Hx of TIA  -Cont home Aspirin and ezetimibe     *DVT ppx  On Heparin    GOC/Code Status: Full Code     Dispo: Pending clinical course as above.     Pt will update his Wife.  80 yo male with pmhx of HTN, RCC s/p partial nephrectomy (2014), gastric cancer s/p gastrectomy (2014), bowel perforation (2019), TIA (2/2024), SVT s/p ablation, established with cardiology recommending pacemaker for sinus pauses presenting to [facility] with flu-like symptoms admitted for sepsis- source unkn.     *Severe sepsis possibly 2/2  vs GI Source   *Kleb Bacteremia  *Hypotensive briefly w/ MAP <60  *Elevated Lactate resolved  *Dehydration  -VS T98.1, , /70, RR18, SpO2 97% RA  -Pt reports fever 102-103 at home  -WBC 5 > 10, Lactate 3.6, Procal 40  -Flu/covid/RSV neg  -CXR and CTAP unremarkable  -S/p 2200cc NS in ED  -Additional 1000cc NS given  -S/p levofloxacin in ED  -Started on Vanc/Zosyn (hx of MRSA)- Continue   - d/c Tamiflu, as RVP Neg  -Lactate downtrended to 1.2  -UCx: Neg  -f/up BldCx: +GNR- Kleb Pna   -f/u wound culture from drainage at abdominal site- pt reports site w. poor healing since 2019 surgical procedure (p/s has followed up outpt w. Surgery and ID- was informed wound would always be open so long mesh in place)  -ID consulted   -f/up repeat 9.15 BldCxs for clearance (will send this evening)    *Hyponatremia, Hypophosphatemia, Hypomagnesemia- all resolved     *Transaminitis- improving   -Likely due to sepsis but also consider congestive hepatopathy  -,   -CT unremarkable  -RUQ sonogram- unremarkable as above     *Elevated proBNP  -BNP 1292  -9.14 TTE EF 60-65%, grade I diastolic dysfunction   -Trop neg  -Cards consulted    *Decreased urine output  -Likely 2/2 dehydration  -Bladder scan 200cc    *HTN  -Hold home Amlodipine due to low BPs- resume as appropriate     *Hx of TIA  -Cont home Aspirin and ezetimibe     *DVT ppx  On Heparin    GOC/Code Status: Full Code     Dispo: Pending clinical course as above.     Pt will update his Wife.

## 2024-09-16 LAB
-  CLINDAMYCIN: SIGNIFICANT CHANGE UP
-  DAPTOMYCIN: SIGNIFICANT CHANGE UP
-  ERYTHROMYCIN: SIGNIFICANT CHANGE UP
-  GENTAMICIN: SIGNIFICANT CHANGE UP
-  LINEZOLID: SIGNIFICANT CHANGE UP
-  OXACILLIN: SIGNIFICANT CHANGE UP
-  PENICILLIN: SIGNIFICANT CHANGE UP
-  RIFAMPIN: SIGNIFICANT CHANGE UP
-  TETRACYCLINE: SIGNIFICANT CHANGE UP
-  TRIMETHOPRIM/SULFAMETHOXAZOLE: SIGNIFICANT CHANGE UP
-  VANCOMYCIN: SIGNIFICANT CHANGE UP
ALBUMIN SERPL ELPH-MCNC: 2.5 G/DL — LOW (ref 3.3–5)
ALP SERPL-CCNC: 116 U/L — SIGNIFICANT CHANGE UP (ref 40–120)
ALT FLD-CCNC: 65 U/L — HIGH (ref 10–45)
ANION GAP SERPL CALC-SCNC: 8 MMOL/L — SIGNIFICANT CHANGE UP (ref 5–17)
AST SERPL-CCNC: 33 U/L — SIGNIFICANT CHANGE UP (ref 10–40)
BILIRUB SERPL-MCNC: 0.3 MG/DL — SIGNIFICANT CHANGE UP (ref 0.2–1.2)
BUN SERPL-MCNC: 17 MG/DL — SIGNIFICANT CHANGE UP (ref 7–23)
CALCIUM SERPL-MCNC: 8.2 MG/DL — LOW (ref 8.4–10.5)
CHLORIDE SERPL-SCNC: 109 MMOL/L — HIGH (ref 96–108)
CO2 SERPL-SCNC: 23 MMOL/L — SIGNIFICANT CHANGE UP (ref 22–31)
CREAT SERPL-MCNC: 0.96 MG/DL — SIGNIFICANT CHANGE UP (ref 0.5–1.3)
CULTURE RESULTS: ABNORMAL
EGFR: 80 ML/MIN/1.73M2 — SIGNIFICANT CHANGE UP
GLUCOSE SERPL-MCNC: 91 MG/DL — SIGNIFICANT CHANGE UP (ref 70–99)
HCT VFR BLD CALC: 32.8 % — LOW (ref 39–50)
HGB BLD-MCNC: 10.9 G/DL — LOW (ref 13–17)
MAGNESIUM SERPL-MCNC: 2 MG/DL — SIGNIFICANT CHANGE UP (ref 1.6–2.6)
MCHC RBC-ENTMCNC: 29.8 PG — SIGNIFICANT CHANGE UP (ref 27–34)
MCHC RBC-ENTMCNC: 33.2 GM/DL — SIGNIFICANT CHANGE UP (ref 32–36)
MCV RBC AUTO: 89.6 FL — SIGNIFICANT CHANGE UP (ref 80–100)
METHOD TYPE: SIGNIFICANT CHANGE UP
NRBC # BLD: 0 /100 WBCS — SIGNIFICANT CHANGE UP (ref 0–0)
ORGANISM # SPEC MICROSCOPIC CNT: ABNORMAL
ORGANISM # SPEC MICROSCOPIC CNT: SIGNIFICANT CHANGE UP
PHOSPHATE SERPL-MCNC: 3.2 MG/DL — SIGNIFICANT CHANGE UP (ref 2.5–4.5)
PLATELET # BLD AUTO: 107 K/UL — LOW (ref 150–400)
POTASSIUM SERPL-MCNC: 4.1 MMOL/L — SIGNIFICANT CHANGE UP (ref 3.5–5.3)
POTASSIUM SERPL-SCNC: 4.1 MMOL/L — SIGNIFICANT CHANGE UP (ref 3.5–5.3)
PROT SERPL-MCNC: 5.7 G/DL — LOW (ref 6–8.3)
RBC # BLD: 3.66 M/UL — LOW (ref 4.2–5.8)
RBC # FLD: 15.4 % — HIGH (ref 10.3–14.5)
SODIUM SERPL-SCNC: 140 MMOL/L — SIGNIFICANT CHANGE UP (ref 135–145)
SPECIMEN SOURCE: SIGNIFICANT CHANGE UP
WBC # BLD: 4.67 K/UL — SIGNIFICANT CHANGE UP (ref 3.8–10.5)
WBC # FLD AUTO: 4.67 K/UL — SIGNIFICANT CHANGE UP (ref 3.8–10.5)

## 2024-09-16 PROCEDURE — 99233 SBSQ HOSP IP/OBS HIGH 50: CPT

## 2024-09-16 RX ADMIN — EZETIMIBE 10 MILLIGRAM(S): 10 TABLET ORAL at 12:07

## 2024-09-16 RX ADMIN — Medication 81 MILLIGRAM(S): at 12:07

## 2024-09-16 RX ADMIN — Medication 5000 UNIT(S): at 17:32

## 2024-09-16 RX ADMIN — Medication 100 MILLIGRAM(S): at 20:54

## 2024-09-16 RX ADMIN — Medication 5000 UNIT(S): at 05:14

## 2024-09-16 NOTE — CONSULT NOTE ADULT - SUBJECTIVE AND OBJECTIVE BOX
HPI:   Patient is a 79y male with history of gastric ca s/p total gastrectomy about 5 years ago then about a year later had bowel perf that required surgical intervention too. 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 It is not making him ill so after several months of he was taken off antibiotics. He came in 3 d ago for 1 day of fever, total body aches, now known to have klebsiella bacteremia. He had elevated bili and liver enzymes now coming down. He has not had any pain in the abdomen, urinates ok but today is nauseated. He reports his urine was brown when he came in now normal color.     REVIEW OF SYSTEMS:  All other review of systems negative (Comprehensive ROS)    PAST MEDICAL & SURGICAL HISTORY:  HTN (hypertension)      Renal cell carcinoma, unspecified laterality  right      Pancreatitis  > 5 yrs ago      SVT (supraventricular tachycardia)  had ablation in 2018      Gastric cancer  2014      S/P gastrectomy  2014      S/P cholecystectomy  2018      H/O partial nephrectomy  2014 - right      H/O umbilical hernia repair  2015, bilat inguina hernia 15 yrs ago      S/P ACL repair  1997      H/O shoulder surgery  right yr 2000, left 2006      History of repair of hiatal hernia  2018      H/O knee surgery  right 2009 - meniscus          Allergies    sulfa drugs (Unknown)    Intolerances        Antimicrobials Day #  :4 total abx  cefTRIAXone   IVPB 2000 milliGRAM(s) IV Intermittent every 24 hours  cefTRIAXone   IVPB        Other Medications:  acetaminophen     Tablet .. 650 milliGRAM(s) Oral every 6 hours PRN  aluminum hydroxide/magnesium hydroxide/simethicone Suspension 30 milliLiter(s) Oral every 4 hours PRN  aspirin  chewable 81 milliGRAM(s) Oral daily  ezetimibe 10 milliGRAM(s) Oral daily  heparin   Injectable 5000 Unit(s) SubCutaneous every 12 hours  melatonin 3 milliGRAM(s) Oral at bedtime PRN  ondansetron Injectable 4 milliGRAM(s) IV Push every 8 hours PRN      FAMILY HISTORY:      SOCIAL HISTORY:  Smoking: [ ]Yes [ x]No  ETOH: [ ]Yes [x ]No  Drug Use: [ ]Yes [x ]No   x[ ] Single[ ]    T(F): 98 (09-16-24 @ 05:54), Max: 98 (09-15-24 @ 19:21)  HR: 53 (09-16-24 @ 05:54)  BP: 108/65 (09-16-24 @ 05:54)  RR: 17 (09-16-24 @ 05:54)  SpO2: 96% (09-16-24 @ 05:54)  Wt(kg): --    PHYSICAL EXAM:  General: alert, no acute distress  Eyes:  anicteric, no conjunctival injection, no discharge  Oropharynx: no lesions or injection 	  Neck: supple, without adenopathy  Lungs: clear to auscultation  Heart: regular rate and rhythm; no murmur, rubs or gallops  Abdomen: soft, nondistended, nontender, without mass or organomegaly  Skin: no lesions  Extremities: no clubbing, cyanosis, or edema  Neurologic: alert, oriented, moves all extremities    LAB RESULTS:                        10.9   4.67  )-----------( 107      ( 16 Sep 2024 06:05 )             32.8     09-16    140  |  109<H>  |  17  ----------------------------<  91  4.1   |  23  |  0.96    Ca    8.2<L>      16 Sep 2024 06:05  Phos  3.2     09-16  Mg     2.0     09-16    TPro  5.7<L>  /  Alb  2.5<L>  /  TBili  0.3  /  DBili  x   /  AST  33  /  ALT  65<H>  /  AlkPhos  116  09-16    LIVER FUNCTIONS - ( 16 Sep 2024 06:05 )  Alb: 2.5 g/dL / Pro: 5.7 g/dL / ALK PHOS: 116 U/L / ALT: 65 U/L / AST: 33 U/L / GGT: x           Urinalysis Basic - ( 16 Sep 2024 06:05 )    Color: x / Appearance: x / SG: x / pH: x  Gluc: 91 mg/dL / Ketone: x  / Bili: x / Urobili: x   Blood: x / Protein: x / Nitrite: x   Leuk Esterase: x / RBC: x / WBC x   Sq Epi: x / Non Sq Epi: x / Bacteria: x        MICROBIOLOGY:  RECENT CULTURES:  09-14 @ 02:00 .Other Sp. Instructions_Additional Info: Wound culture of drainage from abdomen surgical site     Numerous Staphylococcus aureus      09-13 @ 19:02 Clean Catch Clean Catch (Midstream)     <10,000 CFU/mL Normal Urogenital Dawn      09-13 @ 17:08 .Blood Blood-Peripheral Blood Culture PCR  Klebsiella pneumoniae    Growth in aerobic and anaerobic bottles: Klebsiella pneumoniae  See previous culture [id number]    Growth in aerobic and anaerobic bottles: Gram Negative Rods          RADIOLOGY REVIEWED:      ACC: [id number] EXAM:  US ABDOMEN LIMITED   ORDERED BY:  NOAH RODRIGUEZ     PROCEDURE DATE:  09/14/2024          INTERPRETATION:  CLINICAL INFORMATION: Right upper quadrant pain.    COMPARISON: Abdominal CT dated 09/13/2024 and abdominal ultrasound dated   05/18/2018    TECHNIQUE: Sonography of the right upper quadrant.    FINDINGS:    Liver: Normal in size and echogenicity. No focal lesions are identified.  Bile ducts: Normal caliber. Common bile duct measures 3 mm.  Gallbladder: Cholecystectomy.  Pancreas: Poorly visualized.  Right kidney: 10.5 cm. No hydronephrosis. There is 3.0 cm simple   appearing cyst in the midpole  Ascites: None.  IVC: Visualized portions are within normal limits.    IMPRESSION:    Limited visualization of the pancreas, otherwise unremarkable right upper   quadrant ultrasound.                < from: CT Abdomen and Pelvis w/ IV Cont (09.13.24 @ 19:35) >    ACC: [id number] EXAM:  CT ABDOMEN AND PELVIS IC   ORDERED BY: AINSLEY THOMAS     PROCEDURE DATE:  09/13/2024          INTERPRETATION:  CLINICAL INFORMATION: Unexplained fever.    COMPARISON: CT abdomen and pelvis 2/20/2020.    CONTRAST/COMPLICATIONS:  IV Contrast: Omnipaque 350  90 cc administered   10 cc discarded  Oral Contrast: NONE  Complications: None reported at time of study completion    PROCEDURE:  CT of the Abdomen and Pelvis was performed.  Sagittal and coronal reformats were performed.    FINDINGS:  LOWER CHEST: Subcentimeter right middle lobe calcified granuloma. Mild   bibasilar atelectasis.    LIVER: Hepatic lobe small cyst unchanged.  BILE DUCTS: Normal caliber.  GALLBLADDER: Cholecystectomy.  SPLEEN: Numerous subcentimeter splenic calcifications likely from prior   granulomatous disease.  PANCREAS: Within normal limits.  ADRENALS: Within normal limits.  KIDNEYS/URETERS: Within normal limits.    BLADDER: Within normal limits.  REPRODUCTIVE ORGANS: Prostate within normal limits. Partially imaged   right scrotal hydrocele.    BOWEL: Status post total gastrectomy. Jejunal anastomosis. There is a   hiatal hernia containing colon, unchanged No bowel obstruction.  PERITONEUM/RETROPERITONEUM: Within normal limits.  VESSELS: Within normal limits.  LYMPH NODES: No lymphadenopathy.  ABDOMINAL WALL: Midline abdominal wall diastases unchanged.  At the midline inferior to the xiphoid there is a small hernia containing   a tiny portion of nonobstructed small bowel. This is new since the   previous exam of 6/15/2018.  BONES: Within normal limits.    IMPRESSION:    No acute finding to explain the patient's fever.      --- End of Report ---            MALLY SHIRLEY MD; Attending Radiologist  This document has been electronicallysigned. 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 He had elevated bili and lft higher on admit so suspect hepatobiliary source . He may have passed a stone but given stomach removal and bowel reconstruction, maybe just some biliary reflux. He feels nauseated today.     Recommendations:  tailor abx to ceftriaxone  repeat blood cx  gi eval, maybe needs mrcp or even ercp at some point if can be done.   further w/u and eval to be determined  i dont think the abdo mesh infection is source. it is growing staph aureus and no collections or cellulitis present.

## 2024-09-16 NOTE — PROGRESS NOTE ADULT - SUBJECTIVE AND OBJECTIVE BOX
Patient is a 79y old  Male who presents with a chief complaint of severe sepsis 2/2 viral vs bacterial pneumonia (16 Sep 2024 08:00)      Patient seen and examined at bedside. Pt states not feeling as good as yesterday but still ok .  Abd wound still draining     ALLERGIES:  sulfa drugs (Unknown)    MEDICATIONS  (STANDING):  aspirin  chewable 81 milliGRAM(s) Oral daily  cefTRIAXone   IVPB      cefTRIAXone   IVPB 2000 milliGRAM(s) IV Intermittent every 24 hours  ezetimibe 10 milliGRAM(s) Oral daily  heparin   Injectable 5000 Unit(s) SubCutaneous every 12 hours    MEDICATIONS  (PRN):  acetaminophen     Tablet .. 650 milliGRAM(s) Oral every 6 hours PRN Temp greater or equal to 38C (100.4F), Mild Pain (1 - 3)  aluminum hydroxide/magnesium hydroxide/simethicone Suspension 30 milliLiter(s) Oral every 4 hours PRN Dyspepsia  melatonin 3 milliGRAM(s) Oral at bedtime PRN Insomnia  ondansetron Injectable 4 milliGRAM(s) IV Push every 8 hours PRN Nausea and/or Vomiting    Vital Signs Last 24 Hrs  T(F): 98 (16 Sep 2024 05:54), Max: 98 (15 Sep 2024 12:37)  HR: 53 (16 Sep 2024 05:54) (53 - 68)  BP: 108/65 (16 Sep 2024 05:54) (108/65 - 119/69)  RR: 17 (16 Sep 2024 05:54) (16 - 17)  SpO2: 96% (16 Sep 2024 05:54) (94% - 98%)  I&O's Summary    15 Sep 2024 07:01  -  16 Sep 2024 07:00  --------------------------------------------------------  IN: 150 mL / OUT: 0 mL / NET: 150 mL      PHYSICAL EXAM:  General: 80 y/o male in NAD, A/O x 3  ENT: MMM  Neck: Supple, No JVD  Lungs: Clear to auscultation bilaterally, Non labored breathing   Cardio: RRR, S1/S2, No murmurs  Abdomen: Soft, Nontender, Nondistended; Bowel sounds present,  small open area oozing purulent drainage, no erythema at site   Extremities: No calf tenderness, No pitting edema    LABS:                        10.9   4.67  )-----------( 107      ( 16 Sep 2024 06:05 )             32.8     09-16    140  |  109  |  17  ----------------------------<  91  4.1   |  23  |  0.96    Ca    8.2      16 Sep 2024 06:05  Phos  3.2     09-16  Mg     2.0     09-16    TPro  5.7  /  Alb  2.5  /  TBili  0.3  /  DBili  x   /  AST  33  /  ALT  65  /  AlkPhos  116  09-16      PT/INR - ( 13 Sep 2024 17:08 )   PT: 13.3 sec;   INR: 1.17 ratio         PTT - ( 13 Sep 2024 17:08 )  PTT:28.6 sec  Lactate, Blood: 1.2 mmol/L (09-14 @ 06:49)  Lactate, Blood: 2.8 mmol/L (09-14 @ 00:35)  Lactate, Blood: 3.0 mmol/L (09-13 @ 20:19)  Lactate, Blood: 2.4 mmol/L (09-13 @ 18:30)  Lactate, Blood: 3.6 mmol/L (09-13 @ 17:08)    CARDIAC MARKERS ( 14 Sep 2024 00:35 )  x     / 6.2 ng/L / x     / x     / x      CARDIAC MARKERS ( 13 Sep 2024 17:08 )  x     / 4.0 ng/L / x     / x     / x                            Urinalysis Basic - ( 16 Sep 2024 06:05 )    Color: x / Appearance: x / SG: x / pH: x  Gluc: 91 mg/dL / Ketone: x  / Bili: x / Urobili: x   Blood: x / Protein: x / Nitrite: x   Leuk Esterase: x / RBC: x / WBC x   Sq Epi: x / Non Sq Epi: x / Bacteria: x        Culture - Other (collected 14 Sep 2024 02:00)  Source: .Other Sp. Instructions_Additional Info: Wound culture of drainage from abdomen surgical site  Preliminary Report (15 Sep 2024 21:30):    Numerous Staphylococcus aureus    Culture - Urine (collected 13 Sep 2024 19:02)  Source: Clean Catch Clean Catch (Midstream)  Final Report (14 Sep 2024 22:29):    <10,000 CFU/mL Normal Urogenital Dawn    Culture - Blood (collected 13 Sep 2024 17:08)  Source: .Blood Blood-Peripheral  Gram Stain (14 Sep 2024 08:16):    Growth in aerobic and anaerobic bottles: Gram Negative Rods  Final Report (15 Sep 2024 16:00):    Growth in aerobic and anaerobic bottles: Klebsiella pneumoniae    See previous culture [id number]    Culture - Blood (collected 13 Sep 2024 17:08)  Source: .Blood Blood-Peripheral  Gram Stain (14 Sep 2024 08:15):    Growth in aerobic and anaerobic bottles: Gram Negative Rods  Final Report (15 Sep 2024 15:49):    Growth in aerobic and anaerobic bottles: Klebsiella pneumoniae    Direct identification is available within approximately 3-5    hours either by Blood Panel Multiplexed PCR or Direct    MALDI-TOF. Details: [email or web address]  Organism: Blood Culture PCR  Klebsiella pneumoniae (15 Sep 2024 15:49)  Organism: Klebsiella pneumoniae (15 Sep 2024 15:49)      Method Type: TIFFANIE      -  Ampicillin: R >16 These ampicillin results predict results for amoxicillin      -  Ampicillin/Sulbactam: S 8/4      -  Aztreonam: S <=4      -  Cefazolin: S <=2      -  Cefepime: S <=2      -  Cefoxitin: S <=8      -  Ceftriaxone: S <=1      -  Ciprofloxacin: S <=0.25      -  Ertapenem: S <=0.5      -  Gentamicin: S <=2      -  Imipenem: S <=1      -  Levofloxacin: S <=0.5      -  Meropenem: S <=1      -  Piperacillin/Tazobactam: S <=8      -  Tobramycin: S <=2      -  Trimethoprim/Sulfamethoxazole: S <=0.5/9.5  Organism: Blood Culture PCR (15 Sep 2024 15:49)      Method Type: PCR      -  K. pneumoniae group: Detec (K. pneumoniae, K. quasipneumoniae, K. variicola)        RADIOLOGY & ADDITIONAL TESTS:    Care Discussed with Consultants/Other Providers:    Patient is a 79y old  Male who presents with a chief complaint of severe sepsis 2/2 viral vs bacterial pneumonia (16 Sep 2024 08:00)      Patient seen and examined at bedside. Pt states not feeling as good as yesterday but still ok .  Abd wound still draining     ALLERGIES:  sulfa drugs (Unknown)    MEDICATIONS  (STANDING):  aspirin  chewable 81 milliGRAM(s) Oral daily  cefTRIAXone   IVPB      cefTRIAXone   IVPB 2000 milliGRAM(s) IV Intermittent every 24 hours  ezetimibe 10 milliGRAM(s) Oral daily  heparin   Injectable 5000 Unit(s) SubCutaneous every 12 hours    MEDICATIONS  (PRN):  acetaminophen     Tablet .. 650 milliGRAM(s) Oral every 6 hours PRN Temp greater or equal to 38C (100.4F), Mild Pain (1 - 3)  aluminum hydroxide/magnesium hydroxide/simethicone Suspension 30 milliLiter(s) Oral every 4 hours PRN Dyspepsia  melatonin 3 milliGRAM(s) Oral at bedtime PRN Insomnia  ondansetron Injectable 4 milliGRAM(s) IV Push every 8 hours PRN Nausea and/or Vomiting    Vital Signs Last 24 Hrs  T(F): 98 (16 Sep 2024 05:54), Max: 98 (15 Sep 2024 12:37)  HR: 53 (16 Sep 2024 05:54) (53 - 68)  BP: 108/65 (16 Sep 2024 05:54) (108/65 - 119/69)  RR: 17 (16 Sep 2024 05:54) (16 - 17)  SpO2: 96% (16 Sep 2024 05:54) (94% - 98%)  I&O's Summary    15 Sep 2024 07:01  -  16 Sep 2024 07:00  --------------------------------------------------------  IN: 150 mL / OUT: 0 mL / NET: 150 mL      PHYSICAL EXAM:  General: 78 y/o male in NAD, A/O x 3  ENT: MMM  Neck: Supple, No JVD  Lungs: Clear to auscultation bilaterally, Non labored breathing   Cardio: RRR, S1/S2, No murmurs  Abdomen: Soft, Nontender, Nondistended; Bowel sounds present,  small open area oozing purulent drainage, no erythema at site   Extremities: No calf tenderness, No pitting edema    LABS:                        10.9   4.67  )-----------( 107      ( 16 Sep 2024 06:05 )             32.8     09-16    140  |  109  |  17  ----------------------------<  91  4.1   |  23  |  0.96    Ca    8.2      16 Sep 2024 06:05  Phos  3.2     09-16  Mg     2.0     09-16    TPro  5.7  /  Alb  2.5  /  TBili  0.3  /  DBili  x   /  AST  33  /  ALT  65  /  AlkPhos  116  09-16      PT/INR - ( 13 Sep 2024 17:08 )   PT: 13.3 sec;   INR: 1.17 ratio         PTT - ( 13 Sep 2024 17:08 )  PTT:28.6 sec  Lactate, Blood: 1.2 mmol/L (09-14 @ 06:49)  Lactate, Blood: 2.8 mmol/L (09-14 @ 00:35)  Lactate, Blood: 3.0 mmol/L (09-13 @ 20:19)  Lactate, Blood: 2.4 mmol/L (09-13 @ 18:30)  Lactate, Blood: 3.6 mmol/L (09-13 @ 17:08)    CARDIAC MARKERS ( 14 Sep 2024 00:35 )  x     / 6.2 ng/L / x     / x     / x      CARDIAC MARKERS ( 13 Sep 2024 17:08 )  x     / 4.0 ng/L / x     / x     / x                            Urinalysis Basic - ( 16 Sep 2024 06:05 )    Color: x / Appearance: x / SG: x / pH: x  Gluc: 91 mg/dL / Ketone: x  / Bili: x / Urobili: x   Blood: x / Protein: x / Nitrite: x   Leuk Esterase: x / RBC: x / WBC x   Sq Epi: x / Non Sq Epi: x / Bacteria: x        Culture - Other (collected 14 Sep 2024 02:00)  Source: .Other Sp. Instructions_Additional Info: Wound culture of drainage from abdomen surgical site  Preliminary Report (15 Sep 2024 21:30):    Numerous Staphylococcus aureus    Culture - Urine (collected 13 Sep 2024 19:02)  Source: Clean Catch Clean Catch (Midstream)  Final Report (14 Sep 2024 22:29):    <10,000 CFU/mL Normal Urogenital Dawn    Culture - Blood (collected 13 Sep 2024 17:08)  Source: .Blood Blood-Peripheral  Gram Stain (14 Sep 2024 08:16):    Growth in aerobic and anaerobic bottles: Gram Negative Rods  Final Report (15 Sep 2024 16:00):    Growth in aerobic and anaerobic bottles: Klebsiella pneumoniae    See previous culture [id number]    Culture - Blood (collected 13 Sep 2024 17:08)  Source: .Blood Blood-Peripheral  Gram Stain (14 Sep 2024 08:15):    Growth in aerobic and anaerobic bottles: Gram Negative Rods  Final Report (15 Sep 2024 15:49):    Growth in aerobic and anaerobic bottles: Klebsiella pneumoniae    Direct identification is available within approximately 3-5    hours either by Blood Panel Multiplexed PCR or Direct    MALDI-TOF. Details: [email or web address]  Organism: Blood Culture PCR  Klebsiella pneumoniae (15 Sep 2024 15:49)  Organism: Klebsiella pneumoniae (15 Sep 2024 15:49)      Method Type: TIFFANIE      -  Ampicillin: R >16 These ampicillin results predict results for amoxicillin      -  Ampicillin/Sulbactam: S 8/4      -  Aztreonam: S <=4      -  Cefazolin: S <=2      -  Cefepime: S <=2      -  Cefoxitin: S <=8      -  Ceftriaxone: S <=1      -  Ciprofloxacin: S <=0.25      -  Ertapenem: S <=0.5      -  Gentamicin: S <=2      -  Imipenem: S <=1      -  Levofloxacin: S <=0.5      -  Meropenem: S <=1      -  Piperacillin/Tazobactam: S <=8      -  Tobramycin: S <=2      -  Trimethoprim/Sulfamethoxazole: S <=0.5/9.5  Organism: Blood Culture PCR (15 Sep 2024 15:49)      Method Type: PCR      -  K. pneumoniae group: Detec (K. pneumoniae, K. quasipneumoniae, K. variicola)        RADIOLOGY & ADDITIONAL TESTS:    Care Discussed with Consultants/Other Providers:   yes with ID and GI

## 2024-09-16 NOTE — PROGRESS NOTE ADULT - ASSESSMENT
78 yo male with pmhx of HTN, RCC s/p partial nephrectomy (2014), gastric cancer s/p gastrectomy (2014), bowel perforation (2019), TIA (2/2024), SVT s/p ablation, established with cardiology recommending pacemaker for sinus pauses presenting to [facility] with flu-like symptoms admitted for sepsis- source unkn.     *Severe sepsis possibly 2/2  vs GI Source   *Kleb Bacteremia  *Hypotensive briefly w/ MAP <60  *Elevated Lactate resolved  *Dehydration  -VS T98.1, , /70, RR18, SpO2 97% RA  -Pt reports fever 102-103 at home  -WBC 5 > 10, Lactate 3.6, Procal 40  -Flu/covid/RSV neg  -CXR and CTAP unremarkable  -S/p 2200cc NS in ED  -Additional 1000cc NS given  -S/p levofloxacin in ED  -Started on Vanc/Zosyn (hx of MRSA)- Continue   - d/c Tamiflu, as RVP Neg  -Lactate downtrended to 1.2  -UCx: Neg  -f/up BldCx: +GNR- Kleb Pna   -f/u wound culture from drainage at abdominal site- pt reports site w. poor healing since 2019 surgical procedure (p/s has followed up outpt w. Surgery and ID- was informed wound would always be open so long mesh in place)  -ID consulted   -f/up repeat 9.15 BldCxs for clearance (will send this evening)    *Hyponatremia, Hypophosphatemia, Hypomagnesemia- all resolved     *Transaminitis- improving   -Likely due to sepsis but also consider congestive hepatopathy  -,   -CT unremarkable  -RUQ sonogram- unremarkable as above     *Elevated proBNP  -BNP 1292  -9.14 TTE EF 60-65%, grade I diastolic dysfunction   -Trop neg  -Cards consulted    *Decreased urine output  -Likely 2/2 dehydration  -Bladder scan 200cc    *HTN  -Hold home Amlodipine due to low BPs- resume as appropriate     *Hx of TIA  -Cont home Aspirin and ezetimibe     *DVT ppx  On Heparin    GOC/Code Status: Full Code     Dispo: Pending clinical course as above.     Pt will update his Wife.  80 yo male with pmhx of HTN, RCC s/p partial nephrectomy (2014), gastric cancer s/p gastrectomy (2014), bowel perforation (2019), TIA (2/2024), SVT s/p ablation, established with cardiology recommending pacemaker for sinus pauses presenting to [facility] with flu-like symptoms admitted for sepsis- source unkn.     *Severe sepsis possibly 2/2  vs GI Source   *Kleb Bacteremia  *Hypotensive briefly w/ MAP <60  *Elevated Lactate -resolved  *Dehydration- improved   -VS T98.1, , /70, RR18, SpO2 97% RA  -Pt reports fever 102-103 at home  -WBC 5 > 10, Lactate 3.6, Procal 40  -Flu/covid/RSV neg  -CXR and CTAP unremarkable  -S/p 2200cc NS in ED  -Additional 1000cc NS given  -S/p levofloxacin in ED  -Started on Vanc/Zosyn (hx of MRSA)- Continue   -d/c Tamiflu, as RVP Neg  -UCx: Neg  - BldCx: +GNR- Kleb Pna   - wound culture from drainage at abdominal site- positive staph aureus - pt reports site w. poor healing since 2019 surgical procedure (p/s has followed up outpt w. Surgery and ID- was informed wound would always be open so long mesh in place)  -ID consulted - recs appreciated and reviewed   -f/up repeat 9.15 BldCxs for clearance    *Hyponatremia, Hypophosphatemia, Hypomagnesemia- all resolved     *Transaminitis- resolved   -Likely due to sepsis but also consider congestive hepatopathy- will have GI see pt   -,   -CT unremarkable  -RUQ sonogram- unremarkable as above     *Elevated proBNP  -BNP 1292  -9.14 TTE EF 60-65%, grade I diastolic dysfunction   -Trop neg  -Cards consulted-Elevation in BNP may reflect a fluid avid state such as a low albumin . echo done recently evaluate LV function shows evidence for diastolic dysfunction . ? consider Farxiga if renal indices allow.    *Decreased urine output  -Likely 2/2 dehydration  -Bladder scan 200cc    *HTN  -continue to hold  home Amlodipine due to low BPs- resume as appropriate     *Hx of TIA  -Cont home Aspirin and ezetimibe     *DVT ppx  On Heparin    GOC/Code Status: Full Code     Dispo: Pending clinical course as above.     Pt will update his Wife.  80 yo male with pmhx of HTN, RCC s/p partial nephrectomy (2014), gastric cancer s/p gastrectomy (2014), bowel perforation (2019), TIA (2/2024), SVT s/p ablation, established with cardiology recommending pacemaker for sinus pauses presenting to [facility] with flu-like symptoms admitted for sepsis- source unkn.     *Severe sepsis possibly 2/2  vs GI Source   *Kleb Bacteremia  *Hypotensive briefly w/ MAP <60  *Elevated Lactate -resolved  *Dehydration- improved   -VS T98.1, , /70, RR18, SpO2 97% RA  -Pt reports fever 102-103 at home  -WBC 5 > 10, Lactate 3.6, Procal 40  -Flu/covid/RSV neg  -CXR and CTAP unremarkable  -S/p 2200cc NS in ED  -Additional 1000cc NS given  -S/p levofloxacin in ED  -Started on Vanc/Zosyn (hx of MRSA)- change to IV CTX as per ID   -d/c Tamiflu, as RVP Neg  -UCx: Neg  - BldCx: +GNR- Kleb Pna   - wound culture from drainage at abdominal site- positive staph aureus - pt reports site w. poor healing since 2019 surgical procedure (p/s has followed up outpt w. Surgery and ID- was informed wound would always be open so long mesh in place)  -ID consulted - recs appreciated and reviewed   -f/up repeat 9.15 BldCxs for clearance  ID recommended MRCP- ordered non contrast as discussed with GI and paperwork completed     *Hyponatremia, Hypophosphatemia, Hypomagnesemia- all resolved     *Transaminitis- resolved   -Likely due to sepsis but also consider congestive hepatopathy- will have GI see pt   -,   -CT unremarkable  -RUQ sonogram- unremarkable as above     *Elevated proBNP  -BNP 1292  -9.14 TTE EF 60-65%, grade I diastolic dysfunction   -Trop neg  -Cards consulted-Elevation in BNP may reflect a fluid avid state such as a low albumin . echo done recently evaluate LV function shows evidence for diastolic dysfunction . ? consider Farxiga if renal indices allow.    *Decreased urine output  -Likely 2/2 dehydration  -Bladder scan 200cc    *HTN  -continue to hold  home Amlodipine due to low BPs- resume as appropriate     *Hx of TIA  -Cont home Aspirin and ezetimibe     *DVT ppx  On Heparin    GOC/Code Status: Full Code     Dispo: Pending clinical course as above.     Pt will update his Wife.  78 yo male with pmhx of HTN, RCC s/p partial nephrectomy (2014), gastric cancer s/p gastrectomy (2014), bowel perforation (2019), TIA (2/2024), SVT s/p ablation, established with cardiology recommending pacemaker for sinus pauses presenting to [facility] with flu-like symptoms admitted for sepsis- source unkn.     *Severe sepsis possibly 2/2  vs GI Source   *Kleb Bacteremia  *Hypotensive briefly w/ MAP <60  *Elevated Lactate -resolved  *Dehydration- improved   -VS T98.1, , /70, RR18, SpO2 97% RA  -Pt reports fever 102-103 at home  -WBC 5 > 10, Lactate 3.6, Procal 40  -Flu/covid/RSV neg  -CXR and CTAP unremarkable  -S/p 2200cc NS in ED  -Additional 1000cc NS given  -S/p levofloxacin in ED  -Started on Vanc/Zosyn (hx of MRSA)- change to IV CTX as per ID   -d/c Tamiflu, as RVP Neg  -UCx: Neg  - BldCx: +GNR- Kleb Pna   - wound culture from drainage at abdominal site- positive staph aureus - pt reports site w. poor healing since 2019 surgical procedure (p/s has followed up outpt w. Surgery and ID- was informed wound would always be open so long mesh in place)  -ID consulted - recs appreciated and reviewed   -f/up repeat 9.15 BldCxs for clearance  ID recommended MRCP- ordered non contrast as discussed with GI and paperwork completed     *Hyponatremia, Hypophosphatemia, Hypomagnesemia- all resolved     *Transaminitis- resolved   -Likely due to sepsis but also consider congestive hepatopathy- will have GI see pt   -,   -CT unremarkable  -RUQ sonogram- unremarkable as above     *Elevated proBNP  -BNP 1292  -9.14 TTE EF 60-65%, grade I diastolic dysfunction   -Trop neg  -Cards consulted-Elevation in BNP may reflect a fluid avid state such as a low albumin . echo done recently evaluate LV function shows evidence for diastolic dysfunction . ? consider Farxiga if renal indices allow.    *Decreased urine output  -Likely 2/2 dehydration  -Bladder scan 200cc    *HTN  -continue to hold  home Amlodipine due to low BPs- resume as appropriate     *Hx of TIA  -Cont home Aspirin and ezetimibe     *DVT ppx  On Heparin    GOC/Code Status: Full Code     Dispo: Pending clinical course as above.     Updated pt's wife 9/16

## 2024-09-17 DIAGNOSIS — R78.81 BACTEREMIA: ICD-10-CM

## 2024-09-17 LAB — PROCALCITONIN SERPL-MCNC: 5.9 NG/ML — HIGH

## 2024-09-17 PROCEDURE — 74181 MRI ABDOMEN W/O CONTRAST: CPT | Mod: 26

## 2024-09-17 PROCEDURE — 99233 SBSQ HOSP IP/OBS HIGH 50: CPT

## 2024-09-17 PROCEDURE — 99223 1ST HOSP IP/OBS HIGH 75: CPT | Mod: FS

## 2024-09-17 RX ORDER — AMLODIPINE BESYLATE 10 MG/1
5 TABLET ORAL DAILY
Refills: 0 | Status: DISCONTINUED | OUTPATIENT
Start: 2024-09-17 | End: 2024-09-18

## 2024-09-17 RX ADMIN — Medication 81 MILLIGRAM(S): at 11:28

## 2024-09-17 RX ADMIN — Medication 100 MILLIGRAM(S): at 21:43

## 2024-09-17 RX ADMIN — EZETIMIBE 10 MILLIGRAM(S): 10 TABLET ORAL at 11:28

## 2024-09-17 RX ADMIN — AMLODIPINE BESYLATE 5 MILLIGRAM(S): 10 TABLET ORAL at 11:28

## 2024-09-17 NOTE — PROGRESS NOTE ADULT - SUBJECTIVE AND OBJECTIVE BOX
CC: f/u for Klebsiella bacteremia     Patient reports that feels very good.     REVIEW OF SYSTEMS:  All other review of systems negative (Comprehensive ROS)    Antimicrobials Day #  : 5  cefTRIAXone   IVPB 2000 milliGRAM(s) IV Intermittent every 24 hours  cefTRIAXone   IVPB        Other Medications Reviewed    T(F): 97.6 (09-17-24 @ 05:55), Max: 98.3 (09-16-24 @ 19:33)  HR: 55 (09-17-24 @ 05:55)  BP: 135/79 (09-17-24 @ 05:55)  RR: 16 (09-17-24 @ 05:55)  SpO2: 98% (09-17-24 @ 05:55)  Wt(kg): --    PHYSICAL EXAM:  General: alert, no acute distress  Eyes:  anicteric, no conjunctival injection, no discharge  Neck: supple  Lungs: clear to auscultation  Heart: regular rate and rhythm; no murmurs  Abdomen: soft, nondistended, nontender. Bowel sounds +. Abd wound dressed.   Skin: no lesions  Extremities: no clubbing, cyanosis, or edema  Neurologic: alert, oriented, moves all extremities    LAB RESULTS:                        10.9   4.67  )-----------( 107      ( 16 Sep 2024 06:05 )             32.8     09-16    140  |  109[H]  |  17  ----------------------------<  91  4.1   |  23  |  0.96    Ca    8.2[L]      16 Sep 2024 06:05  Phos  3.2     09-16  Mg     2.0     09-16    TPro  5.7[L]  /  Alb  2.5[L]  /  TBili  0.3  /  DBili  x   /  AST  33  /  ALT  65[H]  /  AlkPhos  116  09-16    LIVER FUNCTIONS - ( 16 Sep 2024 06:05 )  Alb: 2.5 g/dL / Pro: 5.7 g/dL / ALK PHOS: 116 U/L / ALT: 65 U/L / AST: 33 U/L / GGT: x           Urinalysis Basic - ( 16 Sep 2024 06:05 )    Color: x / Appearance: x / SG: x / pH: x  Gluc: 91 mg/dL / Ketone: x  / Bili: x / Urobili: x   Blood: x / Protein: x / Nitrite: x   Leuk Esterase: x / RBC: x / WBC x   Sq Epi: x / Non Sq Epi: x / Bacteria: x      MICROBIOLOGY:  RECENT CULTURES:  09-15 @ 19:35 .Blood Blood     No growth at 24 hours      09-14 @ 02:00 .Other Sp. Instructions_Additional Info: Wound culture of drainage from abdomen surgical site Methicillin resistant Staphylococcus aureus    Numerous Methicillin Resistant Staphylococcus aureus      09-13 @ 19:02 Clean Catch Clean Catch (Midstream)     <10,000 CFU/mL Normal Urogenital Dawn      09-13 @ 17:08 .Blood Blood-Peripheral Blood Culture PCR  Klebsiella pneumoniae    Growth in aerobic and anaerobic bottles: Klebsiella pneumoniae  See previous culture [id number]    Growth in aerobic and anaerobic bottles: Gram Negative Rods      RADIOLOGY REVIEWED:  < from: MR MRCP No Cont (09.17.24 @ 09:35) >    IMPRESSION:  1.2 cm cystic focus left hepatic lobe.  No biliary obstruction.  Hiatal hernia containing a segment of colon and a midline ventral hernia   containing a small bowel loop.  Additional findings as above.    < end of copied text >

## 2024-09-17 NOTE — CONSULT NOTE ADULT - SUBJECTIVE AND OBJECTIVE BOX
INTERVAL HPI/OVERNIGHT EVENTS:  HPI:  78 yo male with pmhx of HTN, RCC s/p partial nephrectomy (2014), gastric cancer s/p gastrectomy (2014), bowel perforation (2019), TIA (2/2024), SVT s/p ablation, established with cardiology recommending pacemaker for sinus pauses presenting to [facility] with flu-like symptoms. Symptoms started yesterday, fever 102-103, chills, and body aches. Home COVID test was negative. Was prescribed Tamiflu earlier today after virtual urgent care visit, took one dose prior to arrival. He was not able to keep fluids down which is what prompted him to come to the ED. Denies nausea, vomiting, diarrhea, SOB, coughing, wheezing, rhinorrhea. Denies dysuria but does note decreased urine output despite receiving fluids in the ED. Urine collected appears dark yellow. Has not received flu vaccine this season. (13 Sep 2024 22:14)    GI Consultation for looking GI sources of klebsiella Bacteriemia. Patient seen examined at bed side. He think had EGD/ Colonoscopy in 2019 with Dr Ochoa in [facility]. He had gastric cancer s/p gastrectomy (2014), had PEG placement, which was D/C. He had bowel perforation (2019) needed urgent surgical procedure. 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 Had fever one day before hospitalization. No fever chills since admitted. Denies abdominal pain, nausea, vomiting diarrhea or constipation       MEDICATIONS  (STANDING):  amLODIPine   Tablet 5 milliGRAM(s) Oral daily  aspirin  chewable 81 milliGRAM(s) Oral daily  cefTRIAXone   IVPB 2000 milliGRAM(s) IV Intermittent every 24 hours  cefTRIAXone   IVPB      ezetimibe 10 milliGRAM(s) Oral daily  heparin   Injectable 5000 Unit(s) SubCutaneous every 12 hours    MEDICATIONS  (PRN):  acetaminophen     Tablet .. 650 milliGRAM(s) Oral every 6 hours PRN Temp greater or equal to 38C (100.4F), Mild Pain (1 - 3)  aluminum hydroxide/magnesium hydroxide/simethicone Suspension 30 milliLiter(s) Oral every 4 hours PRN Dyspepsia  melatonin 3 milliGRAM(s) Oral at bedtime PRN Insomnia  ondansetron Injectable 4 milliGRAM(s) IV Push every 8 hours PRN Nausea and/or Vomiting      Allergies    sulfa drugs (Unknown)    Intolerances        PAST MEDICAL & SURGICAL HISTORY:  HTN (hypertension)      Renal cell carcinoma, unspecified laterality  right      Pancreatitis  > 5 yrs ago      SVT (supraventricular tachycardia)  had ablation in 2018      Gastric cancer  2014      S/P gastrectomy  2014      S/P cholecystectomy  2018      H/O partial nephrectomy  2014 - right      H/O umbilical hernia repair  2015, bilat inguina hernia 15 yrs ago      S/P ACL repair  1997      H/O shoulder surgery  right yr 2000, left 2006      History of repair of hiatal hernia  2018      H/O knee surgery  right 2009 - meniscus          REVIEW OF SYSTEMS	  See HPI     PHYSICAL EXAM:   Vital Signs:  Vital Signs Last 24 Hrs  T(C): 36.4 (17 Sep 2024 05:55), Max: 36.8 (16 Sep 2024 19:33)  T(F): 97.6 (17 Sep 2024 05:55), Max: 98.3 (16 Sep 2024 19:33)  HR: 55 (17 Sep 2024 05:55) (55 - 69)  BP: 135/79 (17 Sep 2024 05:55) (124/71 - 135/79)  BP(mean): --  RR: 16 (17 Sep 2024 05:55) (16 - 16)  SpO2: 98% (17 Sep 2024 05:55) (95% - 98%)    Parameters below as of 17 Sep 2024 05:55  Patient On (Oxygen Delivery Method): room air      Daily     Daily I&O's Summary    16 Sep 2024 07:01  -  17 Sep 2024 07:00  --------------------------------------------------------  IN: 480 mL / OUT: 0 mL / NET: 480 mL        GENERAL:  Appears stated age  HEENT:  NC/AT,  conjunctivae clear and pink  CHEST:  Full & symmetric excursion  HEART:  Regular rhythm, S1, S2  ABDOMEN:  Soft, non-tender, non-distended, normoactive bowel sounds, Has dressing from old surgical wound, multiple well healed surgical scars.    EXTREMITIES:  no cyanosis, clubbing or edema  SKIN:  No rash/warm/dry  NEURO:  Alert, oriented      LABS:                        10.9   4.67  )-----------( 107      ( 16 Sep 2024 06:05 )             32.8     09-16    140  |  109[H]  |  17  ----------------------------<  91  4.1   |  23  |  0.96    Ca    8.2[L]      16 Sep 2024 06:05  Phos  3.2     09-16  Mg     2.0     09-16    TPro  5.7[L]  /  Alb  2.5[L]  /  TBili  0.3  /  DBili  x   /  AST  33  /  ALT  65[H]  /  AlkPhos  116  09-16      Urinalysis Basic - ( 16 Sep 2024 06:05 )    Color: x / Appearance: x / SG: x / pH: x  Gluc: 91 mg/dL / Ketone: x  / Bili: x / Urobili: x   Blood: x / Protein: x / Nitrite: x   Leuk Esterase: x / RBC: x / WBC x   Sq Epi: x / Non Sq Epi: x / Bacteria: x      amylase   lipase  RADIOLOGY & ADDITIONAL TESTS:    ACC: [id number] EXAM:  MR MRCP   ORDERED BY: MARCO ANTONIO AN     PROCEDURE DATE:  09/17/2024          INTERPRETATION:  CLINICAL INFORMATION: Elevated LFTs/sepsis. Prior   gastrectomy for gastric cancer.    COMPARISON: CT dated 09/13/2024.    CONTRAST/COMPLICATIONS:  IV Contrast: NONE  Oral Contrast: NONE  Complications: None reported at time of study completion    PROCEDURE:  MRI/MRCP was performed. Radial and 3D MRCP sequences were obtained.      FINDINGS:  LOWER CHEST: Hiatal hernia containing colon. Trace bibasilar atelectasis.    LIVER: 1.2 cm cystic focus left hepatic lobe, indeterminate.  BILE DUCTS: Normal caliber.  GALLBLADDER: Cholecystectomy.  SPLEEN: Multiple calcified granulomata were better identified on CT   imaging.  PANCREAS: Within normal limits.  ADRENALS: Within normal limits.  KIDNEYS/URETERS: 2.9 cm right renal cyst.    VISUALIZED PORTIONS:  BOWEL: Prior gastrectomy and jejunal anastomosis, better delineated on   prior CT imaging.  PERITONEUM: No ascites.  VESSELS: Within normal limits.  RETROPERITONEUM/LYMPH NODES: No lymphadenopathy.  ABDOMINAL WALL: Small bowel containing midline ventral hernia in the   upper abdomen and additional postsurgical change.  BONES: Degenerative changes.    IMPRESSION:  1.2 cm cystic focus left hepatic lobe.  No biliary obstruction.  Hiatal hernia containing a segment of colon and a midline ventral hernia   containing a small bowel loop.  Additional findings as above.    --- End of Report ---    SARWAT HSETER; Attending Radiologist  This document has been electronically signed. Sep 17 2024 10:44AM        ACC: [id number] EXAM:  US ABDOMEN LIMITED   ORDERED BY:  NOAH RODRIGUEZ     PROCEDURE DATE:  09/14/2024    INTERPRETATION:  CLINICAL INFORMATION: Right upper quadrant pain.    COMPARISON: Abdominal CT dated 09/13/2024 and abdominal ultrasound dated   05/18/2018    TECHNIQUE: Sonography of the right upper quadrant.    FINDINGS:    Liver: Normal in size and echogenicity. No focal lesions are identified.  Bile ducts: Normal caliber. Common bile duct measures 3 mm.  Gallbladder: Cholecystectomy.  Pancreas: Poorly visualized.  Right kidney: 10.5 cm. No hydronephrosis. There is 3.0 cm simple   appearing cyst in the midpole  Ascites: None.  IVC: Visualized portions are within normal limits.    IMPRESSION:    Limited visualization of the pancreas, otherwise unremarkable right upper   quadrant ultrasound.      --- End of Report ---      ROBBIN BABB MD; Attending Radiologist  This document has been electronically signed. Sep 14 2024  4:20PM

## 2024-09-17 NOTE — CONSULT NOTE ADULT - NS ATTEND AMEND GEN_ALL_CORE FT
I attest my time as attending was greater than 50% of the total time of 75 min on patient care on this DOS. Time spent includes review of hospital course, labs, vitals, medical records, patient evaluation, contact with family (when present), discussion of plan with the primary team, coordinating services and documenting encounter.

## 2024-09-17 NOTE — PROGRESS NOTE ADULT - ASSESSMENT
80 yo male with pmhx of HTN, RCC s/p partial nephrectomy (2014), gastric cancer s/p gastrectomy (2014), bowel perforation (2019), TIA (2/2024), SVT s/p ablation, established with cardiology recommending pacemaker for sinus pauses presenting to [facility] with flu-like symptoms admitted for sepsis- source unkn.     *Severe sepsis possibly 2/2  vs GI Source - improved  *Kleb Bacteremia  *Hypotensive briefly w/ MAP <60, resolved  *Elevated Lactate -resolved  *Dehydration- improved   -Procal 40 downtrended this AM to 5  -Flu/covid/RSV neg; CXR and CTAP unremarkable  -S/p levofloxacin in ED; Started on Vanc/Zosyn (hx of MRSA)- change to IV CTX as per ID   -UCx: Neg  - BldCx: +GNR- Kleb Pna   - wound culture from drainage at abdominal site- positive MRSA - pt reports site w. poor healing since 2019 surgical procedure (p/s has followed up outpt w. Surgery and ID- was informed wound would always be open so long mesh in place). Contact precautions ordered  -ID consulted - recs appreciated and reviewed   -f/up repeat 9.15 BldCxs for clearance  - ID recommended MRCP- ordered non contrast as discussed with GI and paperwork completed. plan for MRI today    *Hyponatremia, Hypophosphatemia, Hypomagnesemia- all resolved     *Transaminitis- resolved   -Likely due to sepsis but also consider congestive hepatopathy   -CT unremarkable  -RUQ sonogram- unremarkable  -GI consulted - will f/u recs today    *Elevated proBNP  -BNP 1292  -9.14 TTE EF 60-65%, grade I diastolic dysfunction   -Trop neg  -Cards consulted-Elevation in BNP may reflect a fluid avid state such as a low albumin . echo done recently evaluate LV function shows evidence for diastolic dysfunction . ? consider Farxiga if renal indices allow.    *HTN  -home Amlodipine held due to low BPs- resume at 5mg today    *Hx of TIA  -Cont home Aspirin and ezetimibe     *DVT ppx  On Heparin    GOC/Code Status: Full Code     Dispo: Pending clinical course as above.   Discussed treatment plan with pt at bedside and pt was in agreement with the plan. All questions answered.   Updated pt's wife 9/17

## 2024-09-17 NOTE — PROGRESS NOTE ADULT - ASSESSMENT
79y male with history of gastric ca s/p total gastrectomy about 5 years ago then about a year later had bowel perf that required surgical intervention too. 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   Admitted on 9/13/24 with fever, chills, bodyaches x 1 day.  Found afebrile, with mild leukocytosis, lactic acidosis, elevated procal & elevated LFTs   Empirically started on antibiotics for sepsis.  Found to have high grade Klebsiella bacteremia - biliary source suspected.  Responding well to antibiotics.  All presenting symptoms have improved.  LFTs near normal now.  Procal trending down   MRCP essentially non revealing for a biliary focus.    PLAN:  Continue CTX.   Follow repeated blood cx.  GI follow up.   Local wound care to abd wound.   Case reviewed with hospitalist

## 2024-09-17 NOTE — PROGRESS NOTE ADULT - SUBJECTIVE AND OBJECTIVE BOX
CC: Patient is a 79y old  Male who presents with a chief complaint of severe sepsis 2/2 viral vs bacterial pneumonia (16 Sep 2024 13:07)      Interval History: Patient seen and examined at bedside. No acute overnight events. Feels better this morning compared to yesterday. Denies any pain/acute complaints.    ALLERGIES:  sulfa drugs (Unknown)    MEDICATIONS  (STANDING):  aspirin  chewable 81 milliGRAM(s) Oral daily  cefTRIAXone   IVPB 2000 milliGRAM(s) IV Intermittent every 24 hours  cefTRIAXone   IVPB      ezetimibe 10 milliGRAM(s) Oral daily  heparin   Injectable 5000 Unit(s) SubCutaneous every 12 hours    MEDICATIONS  (PRN):  acetaminophen     Tablet .. 650 milliGRAM(s) Oral every 6 hours PRN Temp greater or equal to 38C (100.4F), Mild Pain (1 - 3)  aluminum hydroxide/magnesium hydroxide/simethicone Suspension 30 milliLiter(s) Oral every 4 hours PRN Dyspepsia  melatonin 3 milliGRAM(s) Oral at bedtime PRN Insomnia  ondansetron Injectable 4 milliGRAM(s) IV Push every 8 hours PRN Nausea and/or Vomiting    Vital Signs Last 24 Hrs  T(F): 97.6 (17 Sep 2024 05:55), Max: 98.3 (16 Sep 2024 19:33)  HR: 55 (17 Sep 2024 05:55) (55 - 69)  BP: 135/79 (17 Sep 2024 05:55) (124/71 - 135/79)  RR: 16 (17 Sep 2024 05:55) (16 - 16)  SpO2: 98% (17 Sep 2024 05:55) (95% - 98%)  I&O's Summary    16 Sep 2024 07:01  -  17 Sep 2024 07:00  --------------------------------------------------------  IN: 480 mL / OUT: 0 mL / NET: 480 mL      BMI (kg/m2): 25.8 (09-13-24 @ 16:21)    PHYSICAL EXAM:  GENERAL: pt laying in bed in NAD  CHEST/LUNG: nonlabored breathing; normal respiratory effort   HEART: Regular rate and rhythm; No murmurs noted  ABDOMEN: Soft, Nontender, Nondistended; Bowel sounds present; healed scars noted to abd with small area of open wound noted, minimal erythema surrounding, mild oozing drainage  MUSCULOSKELETAL/EXTREMITIES: FROM of extremities no edema noted to BLE  NERVOUS SYSTEM:   Sensation intact; follows commands  PSYCH: Appropriate affect, Alert & Oriented x 3     LABS:                        10.9   4.67  )-----------( 107      ( 16 Sep 2024 06:05 )             32.8       09-16    140  |  109  |  17  ----------------------------<  91  4.1   |  23  |  0.96    Ca    8.2      16 Sep 2024 06:05  Phos  3.2     09-16  Mg     2.0     09-16    TPro  5.7  /  Alb  2.5  /  TBili  0.3  /  DBili  x   /  AST  33  /  ALT  65  /  AlkPhos  116  09-16                                  Urinalysis Basic - ( 16 Sep 2024 06:05 )    Color: x / Appearance: x / SG: x / pH: x  Gluc: 91 mg/dL / Ketone: x  / Bili: x / Urobili: x   Blood: x / Protein: x / Nitrite: x   Leuk Esterase: x / RBC: x / WBC x   Sq Epi: x / Non Sq Epi: x / Bacteria: x        Culture - Blood (collected 15 Sep 2024 19:35)  Source: .Blood Blood  Preliminary Report (17 Sep 2024 02:02):    No growth at 24 hours    Culture - Blood (collected 15 Sep 2024 19:35)  Source: .Blood Blood  Preliminary Report (17 Sep 2024 02:02):    No growth at 24 hours    Culture - Other (collected 14 Sep 2024 02:00)  Source: .Other Sp. Instructions_Additional Info: Wound culture of drainage from abdomen surgical site  Final Report (16 Sep 2024 20:01):    Numerous Methicillin Resistant Staphylococcus aureus  Organism: Methicillin resistant Staphylococcus aureus (16 Sep 2024 20:01)  Organism: Methicillin resistant Staphylococcus aureus (16 Sep 2024 20:01)      Method Type: TIFFANIE      -  Clindamycin: S <=0.25      -  Daptomycin: S 1      -  Erythromycin: R >4      -  Gentamicin: S <=1 Should not be used as monotherapy      -  Linezolid: S 2      -  Oxacillin: R >2      -  Penicillin: R >8      -  Rifampin: S <=1 Should not be used as monotherapy      -  Tetracycline: S <=1      -  Trimethoprim/Sulfamethoxazole: S <=0.5/9.5      -  Vancomycin: S 1    Culture - Urine (collected 13 Sep 2024 19:02)  Source: Clean Catch Clean Catch (Midstream)  Final Report (14 Sep 2024 22:29):    <10,000 CFU/mL Normal Urogenital Dawn    Culture - Blood (collected 13 Sep 2024 17:08)  Source: .Blood Blood-Peripheral  Gram Stain (14 Sep 2024 08:16):    Growth in aerobic and anaerobic bottles: Gram Negative Rods  Final Report (15 Sep 2024 16:00):    Growth in aerobic and anaerobic bottles: Klebsiella pneumoniae    See previous culture [id number]    Culture - Blood (collected 13 Sep 2024 17:08)  Source: .Blood Blood-Peripheral  Gram Stain (14 Sep 2024 08:15):    Growth in aerobic and anaerobic bottles: Gram Negative Rods  Final Report (15 Sep 2024 15:49):    Growth in aerobic and anaerobic bottles: Klebsiella pneumoniae    Direct identification is available within approximately 3-5    hours either by Blood Panel Multiplexed PCR or Direct    MALDI-TOF. Details: [email or web address]  Organism: Blood Culture PCR  Klebsiella pneumoniae (15 Sep 2024 15:49)  Organism: Klebsiella pneumoniae (15 Sep 2024 15:49)      Method Type: TIFFANIE      -  Ampicillin: R >16 These ampicillin results predict results for amoxicillin      -  Ampicillin/Sulbactam: S 8/4      -  Aztreonam: S <=4      -  Cefazolin: S <=2      -  Cefepime: S <=2      -  Cefoxitin: S <=8      -  Ceftriaxone: S <=1      -  Ciprofloxacin: S <=0.25      -  Ertapenem: S <=0.5      -  Gentamicin: S <=2      -  Imipenem: S <=1      -  Levofloxacin: S <=0.5      -  Meropenem: S <=1      -  Piperacillin/Tazobactam: S <=8      -  Tobramycin: S <=2      -  Trimethoprim/Sulfamethoxazole: S <=0.5/9.5  Organism: Blood Culture PCR (15 Sep 2024 15:49)      Method Type: PCR      -  K. pneumoniae group: Detec (K. pneumoniae, K. quasipneumoniae, K. variicola)          Care Discussed with Consultants/Other Providers: Yes with GI

## 2024-09-17 NOTE — CONSULT NOTE ADULT - ASSESSMENT
78 yo male with pmhx of HTN, RCC s/p partial nephrectomy (2014), gastric cancer s/p gastrectomy (2014), bowel perforation (2019), TIA (2/2024), SVT s/p ablation, established with cardiology recommending pacemaker for sinus pauses presenting to ED with flu-like     GI GI Consultation for looking GI sources of klebsiella Bacteriemia.  Discussed Colonoscopy Endoscopy procedure with patient He want to think

## 2024-09-17 NOTE — CONSULT NOTE ADULT - PROBLEM SELECTOR RECOMMENDATION 9
Unsure of the GI source of bacteriemia on patient with Multiple abdominal surgeries in the past   Abdominal US reviewed  MRCP result reviewed   Continue Antibiotics as per ID   Will discuss further the need of Colonoscopy Endoscopy

## 2024-09-17 NOTE — CONSULT NOTE ADULT - REASON FOR ADMISSION
severe sepsis 2/2 viral vs bacterial pneumonia

## 2024-09-18 ENCOUNTER — TRANSCRIPTION ENCOUNTER (OUTPATIENT)
Age: 79
End: 2024-09-18

## 2024-09-18 LAB
ALBUMIN SERPL ELPH-MCNC: 2.9 G/DL — LOW (ref 3.3–5)
ALP SERPL-CCNC: 137 U/L — HIGH (ref 40–120)
ALT FLD-CCNC: 50 U/L — HIGH (ref 10–45)
ANION GAP SERPL CALC-SCNC: 9 MMOL/L — SIGNIFICANT CHANGE UP (ref 5–17)
AST SERPL-CCNC: 30 U/L — SIGNIFICANT CHANGE UP (ref 10–40)
BILIRUB SERPL-MCNC: 0.4 MG/DL — SIGNIFICANT CHANGE UP (ref 0.2–1.2)
BUN SERPL-MCNC: 18 MG/DL — SIGNIFICANT CHANGE UP (ref 7–23)
CALCIUM SERPL-MCNC: 8.7 MG/DL — SIGNIFICANT CHANGE UP (ref 8.4–10.5)
CHLORIDE SERPL-SCNC: 106 MMOL/L — SIGNIFICANT CHANGE UP (ref 96–108)
CO2 SERPL-SCNC: 27 MMOL/L — SIGNIFICANT CHANGE UP (ref 22–31)
CREAT SERPL-MCNC: 0.94 MG/DL — SIGNIFICANT CHANGE UP (ref 0.5–1.3)
EGFR: 82 ML/MIN/1.73M2 — SIGNIFICANT CHANGE UP
GLUCOSE SERPL-MCNC: 105 MG/DL — HIGH (ref 70–99)
HCT VFR BLD CALC: 36.4 % — LOW (ref 39–50)
HGB BLD-MCNC: 12.4 G/DL — LOW (ref 13–17)
MCHC RBC-ENTMCNC: 30 PG — SIGNIFICANT CHANGE UP (ref 27–34)
MCHC RBC-ENTMCNC: 34.1 GM/DL — SIGNIFICANT CHANGE UP (ref 32–36)
MCV RBC AUTO: 87.9 FL — SIGNIFICANT CHANGE UP (ref 80–100)
NRBC # BLD: 0 /100 WBCS — SIGNIFICANT CHANGE UP (ref 0–0)
PLATELET # BLD AUTO: 178 K/UL — SIGNIFICANT CHANGE UP (ref 150–400)
POTASSIUM SERPL-MCNC: 4 MMOL/L — SIGNIFICANT CHANGE UP (ref 3.5–5.3)
POTASSIUM SERPL-SCNC: 4 MMOL/L — SIGNIFICANT CHANGE UP (ref 3.5–5.3)
PROT SERPL-MCNC: 6.7 G/DL — SIGNIFICANT CHANGE UP (ref 6–8.3)
RBC # BLD: 4.14 M/UL — LOW (ref 4.2–5.8)
RBC # FLD: 14.8 % — HIGH (ref 10.3–14.5)
SODIUM SERPL-SCNC: 142 MMOL/L — SIGNIFICANT CHANGE UP (ref 135–145)
WBC # BLD: 6.73 K/UL — SIGNIFICANT CHANGE UP (ref 3.8–10.5)
WBC # FLD AUTO: 6.73 K/UL — SIGNIFICANT CHANGE UP (ref 3.8–10.5)

## 2024-09-18 PROCEDURE — 99232 SBSQ HOSP IP/OBS MODERATE 35: CPT

## 2024-09-18 PROCEDURE — 93010 ELECTROCARDIOGRAM REPORT: CPT

## 2024-09-18 PROCEDURE — 99233 SBSQ HOSP IP/OBS HIGH 50: CPT | Mod: FS

## 2024-09-18 PROCEDURE — 99233 SBSQ HOSP IP/OBS HIGH 50: CPT

## 2024-09-18 RX ORDER — AMLODIPINE BESYLATE 10 MG/1
10 TABLET ORAL DAILY
Refills: 0 | Status: DISCONTINUED | OUTPATIENT
Start: 2024-09-19 | End: 2024-09-19

## 2024-09-18 RX ORDER — ACETAMINOPHEN 325 MG/1
2 TABLET ORAL
Qty: 0 | Refills: 0 | DISCHARGE
Start: 2024-09-18

## 2024-09-18 RX ORDER — LEVOFLOXACIN 5 MG/ML
1 INJECTION, SOLUTION INTRAVENOUS
Qty: 8 | Refills: 0
Start: 2024-09-18

## 2024-09-18 RX ADMIN — Medication 100 MILLIGRAM(S): at 20:50

## 2024-09-18 RX ADMIN — EZETIMIBE 10 MILLIGRAM(S): 10 TABLET ORAL at 11:56

## 2024-09-18 RX ADMIN — AMLODIPINE BESYLATE 5 MILLIGRAM(S): 10 TABLET ORAL at 06:31

## 2024-09-18 RX ADMIN — Medication 81 MILLIGRAM(S): at 11:57

## 2024-09-18 NOTE — DISCHARGE NOTE PROVIDER - CARE PROVIDERS DIRECT ADDRESSES
,[email or web address]

## 2024-09-18 NOTE — DISCHARGE NOTE PROVIDER - ATTENDING DISCHARGE PHYSICAL EXAMINATION:
GENERAL: pt laying in bed in NAD  HEENT: NC/AT, MMM  HENT: NC/AT, mmm  PULMONARY: nonlabored breathing, no resp distress  GASTROINTESTINAL: soft nontender    MUSCULOSKELETAL: no cyanosis, no edema to BLE  Psych: A&Ox3, appropriate affect

## 2024-09-18 NOTE — PROGRESS NOTE ADULT - SUBJECTIVE AND OBJECTIVE BOX
Chief Complaint:  Patient is a 79y old  Male who presents with a chief complaint of severe sepsis 2/2 viral vs bacterial pneumonia (18 Sep 2024 07:52)      HPI/ 24 hr events: Patient seen and examined at bedside. Pt feeling well this morning. Tolerating diet. BM. Denies nausea, vomiting, diarrhea, abdominal pain, hematemesis, hematochezia, melena. Vitals are overall stable, no leukocytosis, Hgb, LFTs stable.       MEDICATIONS:   MEDICATIONS  (STANDING):  amLODIPine   Tablet 5 milliGRAM(s) Oral daily  aspirin  chewable 81 milliGRAM(s) Oral daily  cefTRIAXone   IVPB 2000 milliGRAM(s) IV Intermittent every 24 hours  cefTRIAXone   IVPB      ezetimibe 10 milliGRAM(s) Oral daily  heparin   Injectable 5000 Unit(s) SubCutaneous every 12 hours    MEDICATIONS  (PRN):  acetaminophen     Tablet .. 650 milliGRAM(s) Oral every 6 hours PRN Temp greater or equal to 38C (100.4F), Mild Pain (1 - 3)  aluminum hydroxide/magnesium hydroxide/simethicone Suspension 30 milliLiter(s) Oral every 4 hours PRN Dyspepsia  melatonin 3 milliGRAM(s) Oral at bedtime PRN Insomnia  ondansetron Injectable 4 milliGRAM(s) IV Push every 8 hours PRN Nausea and/or Vomiting      DIET:  Diet, Regular (09-13-24 @ 23:04) [Active]    ALLERGIES:   Allergies    sulfa drugs (Unknown)    Intolerances    VITAL SIGNS:   Vital Signs Last 24 Hrs  T(C): 36.5 (18 Sep 2024 04:54), Max: 36.8 (17 Sep 2024 13:44)  T(F): 97.7 (18 Sep 2024 04:54), Max: 98.2 (17 Sep 2024 13:44)  HR: 56 (18 Sep 2024 04:54) (50 - 57)  BP: 131/78 (18 Sep 2024 04:54) (131/78 - 140/79)  BP(mean): --  RR: 17 (18 Sep 2024 04:54) (16 - 17)  SpO2: 95% (18 Sep 2024 04:54) (94% - 95%)    Parameters below as of 18 Sep 2024 04:54  Patient On (Oxygen Delivery Method): room air      I&O's Summary    17 Sep 2024 07:01  -  18 Sep 2024 07:00  --------------------------------------------------------  IN: 720 mL / OUT: 0 mL / NET: 720 mL        PHYSICAL EXAM:   GENERAL:  No acute distress  HEENT:  NC/AT, conjunctiva clear, sclera anicteric  CHEST:  No increased effort  HEART:  Regular rate  ABDOMEN:  Soft, non-tender, non-distended, positive bowel sounds, abdominal dressing intact and healed surgical scars.   EXTREMITIES: No edema  SKIN:  Warm, dry  NEURO:  Calm, cooperative    LABS:                        12.4   6.73  )-----------( 178      ( 18 Sep 2024 07:18 )             36.4     Hemoglobin: 12.4 g/dL (09-18-24 @ 07:18)  Hemoglobin: 10.9 g/dL (09-16-24 @ 06:05)    09-18    142  |  106  |  18  ----------------------------<  105[H]  4.0   |  27  |  0.94    Ca    8.7      18 Sep 2024 07:18    TPro  6.7  /  Alb  2.9[L]  /  TBili  0.4  /  DBili  x   /  AST  30  /  ALT  50[H]  /  AlkPhos  137[H]  09-18    LIVER FUNCTIONS - ( 18 Sep 2024 07:18 )  Alb: 2.9 g/dL / Pro: 6.7 g/dL / ALK PHOS: 137 U/L / ALT: 50 U/L / AST: 30 U/L / GGT: x             Culture - Blood (collected 15 Sep 2024 19:35)  Source: .Blood Blood  Preliminary Report (18 Sep 2024 02:02):    No growth at 48 Hours    Culture - Blood (collected 15 Sep 2024 19:35)  Source: .Blood Blood  Preliminary Report (18 Sep 2024 02:02):    No growth at 48 Hours        RADIOLOGY & ADDITIONAL STUDIES:      ACC: [id number] EXAM:  MR MRCP   ORDERED BY: MARCO ANTONIO AN     PROCEDURE DATE:  09/17/2024      INTERPRETATION:  CLINICAL INFORMATION: Elevated LFTs/sepsis. Prior   gastrectomy for gastric cancer.    COMPARISON: CT dated 09/13/2024.    CONTRAST/COMPLICATIONS:  IV Contrast: NONE  Oral Contrast: NONE  Complications: None reported at time of study completion    PROCEDURE:  MRI/MRCP was performed. Radial and 3D MRCP sequences were obtained.      FINDINGS:  LOWER CHEST: Hiatal hernia containing colon. Trace bibasilar atelectasis.    LIVER: 1.2 cm cystic focus left hepatic lobe, indeterminate.  BILE DUCTS: Normal caliber.  GALLBLADDER: Cholecystectomy.  SPLEEN: Multiple calcified granulomata were better identified on CT   imaging.  PANCREAS: Within normal limits.  ADRENALS: Within normal limits.  KIDNEYS/URETERS: 2.9 cm right renal cyst.    VISUALIZED PORTIONS:  BOWEL: Prior gastrectomy and jejunal anastomosis, better delineated on   prior CT imaging.  PERITONEUM: No ascites.  VESSELS: Within normal limits.  RETROPERITONEUM/LYMPH NODES: No lymphadenopathy.  ABDOMINAL WALL: Small bowel containing midline ventral hernia in the   upper abdomen and additional postsurgical change.  BONES: Degenerative changes.    IMPRESSION:  1.2 cm cystic focus left hepatic lobe.  No biliary obstruction.  Hiatal hernia containing a segment of colon and a midline ventral hernia   containing a small bowel loop.  Additional findings as above.      --- End of Report ---      SARWAT HESTER; Attending Radiologist  This document has been electronically signed. Sep 17 2024 10:44AM  09-17-24 @ 09:35

## 2024-09-18 NOTE — DISCHARGE NOTE PROVIDER - NSDCPNSUBOBJ_GEN_ALL_CORE
Pt seen and examined at bedside this morning. Reports feeling well w/o complaints. Eager to go home.  Discussed abx for 8 more days and f/u with ID, PCP, and GI physicians Pt seen and examined at bedside this morning. Reports feeling well w/o complaints. Eager to go home.  Discussed abx for 8 more days and f/u with ID, PCP, and GI physicians and cardio

## 2024-09-18 NOTE — DISCHARGE NOTE NURSING/CASE MANAGEMENT/SOCIAL WORK - PATIENT PORTAL LINK FT
You can access the FollowMyHealth Patient Portal offered by [facility] by registering at the following website: [email or web address]. By joining [facility]’s FollowMyHealth portal, you will also be able to view your health information using other applications (apps) compatible with our system.

## 2024-09-18 NOTE — PROGRESS NOTE ADULT - SUBJECTIVE AND OBJECTIVE BOX
CC: Patient is a 79y old  Male who presents with a chief complaint of severe sepsis 2/2 viral vs bacterial pneumonia (18 Sep 2024 13:38)      Interval History: Patient seen and examined at bedside. No acute overnight events. No complaints this morning. However, pt had episode of lightheadedness in afternoon with palpitations.    ALLERGIES:  sulfa drugs (Unknown)    MEDICATIONS  (STANDING):  amLODIPine   Tablet 5 milliGRAM(s) Oral daily  aspirin  chewable 81 milliGRAM(s) Oral daily  cefTRIAXone   IVPB 2000 milliGRAM(s) IV Intermittent every 24 hours  cefTRIAXone   IVPB      ezetimibe 10 milliGRAM(s) Oral daily  heparin   Injectable 5000 Unit(s) SubCutaneous every 12 hours    MEDICATIONS  (PRN):  acetaminophen     Tablet .. 650 milliGRAM(s) Oral every 6 hours PRN Temp greater or equal to 38C (100.4F), Mild Pain (1 - 3)  aluminum hydroxide/magnesium hydroxide/simethicone Suspension 30 milliLiter(s) Oral every 4 hours PRN Dyspepsia  melatonin 3 milliGRAM(s) Oral at bedtime PRN Insomnia  ondansetron Injectable 4 milliGRAM(s) IV Push every 8 hours PRN Nausea and/or Vomiting    Vital Signs Last 24 Hrs  T(F): 97.7 (18 Sep 2024 04:54), Max: 98.1 (17 Sep 2024 20:00)  HR: 56 (18 Sep 2024 04:54) (50 - 56)  BP: 131/78 (18 Sep 2024 04:54) (131/78 - 139/85)  RR: 17 (18 Sep 2024 04:54) (16 - 17)  SpO2: 95% (18 Sep 2024 04:54) (95% - 95%)  I&O's Summary    17 Sep 2024 07:01  -  18 Sep 2024 07:00  --------------------------------------------------------  IN: 720 mL / OUT: 0 mL / NET: 720 mL      BMI (kg/m2): 25.8 (09-13-24 @ 16:21)    PHYSICAL EXAM:  GENERAL: pt laying in bed in NAD  HEENT: NC/AT, MMM  HENT: NC/AT, mmm  PULMONARY: nonlabored breathing, no resp distress  GASTROINTESTINAL: soft nontender    MUSCULOSKELETAL: no cyanosis, no edema to BLE  Psych: A&Ox3, appropriate affect      LABS:                        12.4   6.73  )-----------( 178      ( 18 Sep 2024 07:18 )             36.4       09-18    142  |  106  |  18  ----------------------------<  105  4.0   |  27  |  0.94    Ca    8.7      18 Sep 2024 07:18  Phos  3.2     09-16  Mg     2.0     09-16    TPro  6.7  /  Alb  2.9  /  TBili  0.4  /  DBili  x   /  AST  30  /  ALT  50  /  AlkPhos  137  09-18                                  Urinalysis Basic - ( 18 Sep 2024 07:18 )    Color: x / Appearance: x / SG: x / pH: x  Gluc: 105 mg/dL / Ketone: x  / Bili: x / Urobili: x   Blood: x / Protein: x / Nitrite: x   Leuk Esterase: x / RBC: x / WBC x   Sq Epi: x / Non Sq Epi: x / Bacteria: x        Culture - Blood (collected 15 Sep 2024 19:35)  Source: .Blood Blood  Preliminary Report (18 Sep 2024 02:02):    No growth at 48 Hours    Culture - Blood (collected 15 Sep 2024 19:35)  Source: .Blood Blood  Preliminary Report (18 Sep 2024 02:02):    No growth at 48 Hours    Culture - Other (collected 14 Sep 2024 02:00)  Source: .Other Sp. Instructions_Additional Info: Wound culture of drainage from abdomen surgical site  Final Report (16 Sep 2024 20:01):    Numerous Methicillin Resistant Staphylococcus aureus  Organism: Methicillin resistant Staphylococcus aureus (16 Sep 2024 20:01)  Organism: Methicillin resistant Staphylococcus aureus (16 Sep 2024 20:01)      -  Clindamycin: S <=0.25      -  Oxacillin: R >2      -  Gentamicin: S <=1 Should not be used as monotherapy      -  Daptomycin: S 1      -  Linezolid: S 2      -  Vancomycin: S 1      -  Tetracycline: S <=1      Method Type: TIFFANIE      -  Penicillin: R >8      -  Rifampin: S <=1 Should not be used as monotherapy      -  Erythromycin: R >4      -  Trimethoprim/Sulfamethoxazole: S <=0.5/9.5    Culture - Urine (collected 13 Sep 2024 19:02)  Source: Clean Catch Clean Catch (Midstream)  Final Report (14 Sep 2024 22:29):    <10,000 CFU/mL Normal Urogenital Dawn    Culture - Blood (collected 13 Sep 2024 17:08)  Source: .Blood Blood-Peripheral  Gram Stain (14 Sep 2024 08:16):    Growth in aerobic and anaerobic bottles: Gram Negative Rods  Final Report (15 Sep 2024 16:00):    Growth in aerobic and anaerobic bottles: Klebsiella pneumoniae    See previous culture [id number]    Culture - Blood (collected 13 Sep 2024 17:08)  Source: .Blood Blood-Peripheral  Gram Stain (14 Sep 2024 08:15):    Growth in aerobic and anaerobic bottles: Gram Negative Rods  Final Report (15 Sep 2024 15:49):    Growth in aerobic and anaerobic bottles: Klebsiella pneumoniae    Direct identification is available within approximately 3-5    hours either by Blood Panel Multiplexed PCR or Direct    MALDI-TOF. Details: [email or web address]  Organism: Blood Culture PCR  Klebsiella pneumoniae (15 Sep 2024 15:49)  Organism: Klebsiella pneumoniae (15 Sep 2024 15:49)      -  Levofloxacin: S <=0.5      -  Tobramycin: S <=2      -  Aztreonam: S <=4      -  Gentamicin: S <=2      -  Cefazolin: S <=2      -  Cefepime: S <=2      -  Piperacillin/Tazobactam: S <=8      -  Ciprofloxacin: S <=0.25      -  Imipenem: S <=1      -  Ceftriaxone: S <=1      -  Ampicillin: R >16 These ampicillin results predict results for amoxicillin      Method Type: TIFFANIE      -  Meropenem: S <=1      -  Ampicillin/Sulbactam: S 8/4      -  Cefoxitin: S <=8      -  Trimethoprim/Sulfamethoxazole: S <=0.5/9.5      -  Ertapenem: S <=0.5  Organism: Blood Culture PCR (15 Sep 2024 15:49)      Method Type: PCR      -  K. pneumoniae group: Detec (K. pneumoniae, K. quasipneumoniae, K. variicola)          Care Discussed with Consultants/Other Providers: Yes with cardio and ID

## 2024-09-18 NOTE — PROGRESS NOTE ADULT - PROBLEM SELECTOR PLAN 1
Unsure of the GI source of bacteriemia on patient with Multiple abdominal surgeries in the past   Abdominal US reviewed  MRCP result reviewed   Continue Antibiotics as per ID   Out patient GI F/U

## 2024-09-18 NOTE — PROGRESS NOTE ADULT - ASSESSMENT
Assessment:  Cristino Huff is an 79 year old man with past medical history of Hypertension, SVT (s/p ablation), TIA, Renal cell cancer (s/p partial nephrectomy) and Gastric cancer (s/p gastrectomy) initially presented with fever and body aches, found to have sepsis secondary to Klebsiella pneumoniae, concerning for biliary source.     Cardiology re-consulted today due to patient reporting palpitations and ECG with PVC. ECG today consistent with sinus rhythm and PVC. Troponins negative x 2. Echo report with normal LVEF 60-65%.    Recommendations:  [] Palpitations: PVC  Assessment:  Cristino Huff is an 79 year old man with past medical history of Hypertension, SVT (s/p ablation), TIA, Renal cell cancer (s/p partial nephrectomy) and Gastric cancer (s/p gastrectomy) initially presented with fever and body aches, found to have sepsis secondary to Klebsiella pneumoniae, concerning for biliary source.     Cardiology re-consulted today due to patient reporting palpitations. The patient reports that he felt flushing and then briefly had palpitations, denies chest pain or shortness of breath. ECG today consistent with sinus rhythm and PVC. Troponins negative x 2. Echo report with normal LVEF 60-65%. Telemetry not on patient at this time.     Recommendations:  [] Palpitations: Symptoms may have been due to PVC, however patient was not on telemetry. Place patient on telemetry to ensure no recurrent SVT. Repeat ECG. Check BMP and Mg. Would avoid AV jeromy blockers at this time as patient reports he will be having a pacemaker placed with his cardiac EP at [facility] soon due to pauses - recommended patient to have Infectious Disease clearance prior to PPM placement given this admission with bacteremia (although now repeat blood cultures negative), patient agrees.     Updated Dr. Lew. Will sign out this case to cardiologist to follow along tomorrow.    Kaden Pacheco MD  Cardiology

## 2024-09-18 NOTE — DISCHARGE NOTE PROVIDER - NSDCMRMEDTOKEN_GEN_ALL_CORE_FT
amLODIPine 10 mg oral tablet: 1 tab(s) orally once a day  aspirin 81 mg oral capsule: 1 cap(s) orally once a day  ezetimibe 10 mg oral tablet: 1 tab(s) orally once a day   acetaminophen 325 mg oral tablet: 2 tab(s) orally every 6 hours As needed Temp greater or equal to 38C (100.4F), Mild Pain (1 - 3)  amLODIPine 10 mg oral tablet: 1 tab(s) orally once a day  aspirin 81 mg oral capsule: 1 cap(s) orally once a day  ezetimibe 10 mg oral tablet: 1 tab(s) orally once a day  levoFLOXacin 500 mg oral tablet: 1 tab(s) orally once a day

## 2024-09-18 NOTE — PROGRESS NOTE ADULT - SUBJECTIVE AND OBJECTIVE BOX
CC: f/u for kleb bacteremia    Patient reports he is feeling great    REVIEW OF SYSTEMS:  All other review of systems negative (Comprehensive ROS)    Antimicrobials Day #  :6/14  cefTRIAXone   IVPB 2000 milliGRAM(s) IV Intermittent every 24 hours  cefTRIAXone   IVPB        Other Medications Reviewed    T(F): 97.7 (09-18-24 @ 04:54), Max: 98.2 (09-17-24 @ 13:44)  HR: 56 (09-18-24 @ 04:54)  BP: 131/78 (09-18-24 @ 04:54)  RR: 17 (09-18-24 @ 04:54)  SpO2: 95% (09-18-24 @ 04:54)  Wt(kg): --    PHYSICAL EXAM:  General: alert, no acute distress  Eyes:  anicteric, no conjunctival injection, no discharge  Oropharynx: no lesions or injection 	  Neck: supple, without adenopathy  Lungs: clear to auscultation  Heart: regular rate and rhythm; no murmur, rubs or gallops  Abdomen: soft, nondistended, nontender, without mass or organomegaly  Skin: no lesions  Extremities: no clubbing, cyanosis, or edema  Neurologic: alert, oriented, moves all extremities    LAB RESULTS:                        12.4   6.73  )-----------( 178      ( 18 Sep 2024 07:18 )             36.4     09-18    142  |  106  |  18  ----------------------------<  105[H]  4.0   |  27  |  0.94    Ca    8.7      18 Sep 2024 07:18    TPro  6.7  /  Alb  2.9[L]  /  TBili  0.4  /  DBili  x   /  AST  30  /  ALT  50[H]  /  AlkPhos  137[H]  09-18    LIVER FUNCTIONS - ( 18 Sep 2024 07:18 )  Alb: 2.9 g/dL / Pro: 6.7 g/dL / ALK PHOS: 137 U/L / ALT: 50 U/L / AST: 30 U/L / GGT: x           Urinalysis Basic - ( 18 Sep 2024 07:18 )    Color: x / Appearance: x / SG: x / pH: x  Gluc: 105 mg/dL / Ketone: x  / Bili: x / Urobili: x   Blood: x / Protein: x / Nitrite: x   Leuk Esterase: x / RBC: x / WBC x   Sq Epi: x / Non Sq Epi: x / Bacteria: x      MICROBIOLOGY:  RECENT CULTURES:  09-15 @ 19:35 .Blood Blood     No growth at 48 Hours      09-14 @ 02:00 .Other Sp. Instructions_Additional Info: Wound culture of drainage from abdomen surgical site Methicillin resistant Staphylococcus aureus    Numerous Methicillin Resistant Staphylococcus aureus      09-13 @ 19:02 Clean Catch Clean Catch (Midstream)     <10,000 CFU/mL Normal Urogenital Dawn      09-13 @ 17:08 .Blood Blood-Peripheral Blood Culture PCR  Klebsiella pneumoniae    Growth in aerobic and anaerobic bottles: Klebsiella pneumoniae  See previous culture [id number]    Growth in aerobic and anaerobic bottles: Gram Negative Rods        RADIOLOGY REVIEWED:    < from: MR MRCP No Cont (09.17.24 @ 09:35) >  ACC: [id number] EXAM:  MR MRCP   ORDERED BY: MARCO ANTONIO AN     PROCEDURE DATE:  09/17/2024          INTERPRETATION:  CLINICAL INFORMATION: Elevated LFTs/sepsis. Prior   gastrectomy for gastric cancer.    COMPARISON: CT dated 09/13/2024.    CONTRAST/COMPLICATIONS:  IV Contrast: NONE  Oral Contrast: NONE  Complications: None reported at time of study completion    PROCEDURE:  MRI/MRCP was performed. Radial and 3D MRCP sequences were obtained.      FINDINGS:  LOWER CHEST: Hiatal hernia containing colon. Trace bibasilar atelectasis.    LIVER: 1.2 cm cystic focus left hepatic lobe, indeterminate.  BILE DUCTS: Normal caliber.  GALLBLADDER: Cholecystectomy.  SPLEEN: Multiple calcified granulomata were better identified on CT   imaging.  PANCREAS: Within normal limits.  ADRENALS: Within normal limits.  KIDNEYS/URETERS: 2.9 cm right renal cyst.    VISUALIZED PORTIONS:  BOWEL: Prior gastrectomy and jejunal anastomosis, better delineated on   prior CT imaging.  PERITONEUM: No ascites.  VESSELS: Within normal limits.  RETROPERITONEUM/LYMPH NODES: No lymphadenopathy.  ABDOMINAL WALL: Small bowel containing midline ventral hernia in the   upper abdomen and additional postsurgical change.  BONES: Degenerative changes.    IMPRESSION:  1.2 cm cystic focus left hepatic lobe.  No biliary obstruction.  Hiatal hernia containing a segment of colon and a midline ventral hernia   containing a small bowel loop.  Additional findings as above.          --- End of Report ---            SARWAT HESTER; Attending Radiologist  This document has been electronically signed. Sep 17 2024 10:44AM    < end of copied text >            Assessment:  Patient with history of gastrectomy, sb rupture s/p surgical intervention, admitted for fever, rigors, found to have klebsiella bacteremia. Liver enzymes and bili were high on admit. mrcp is unrevealing and ct a/p unrevealing, ua no wbc . Still suspect gi source of bacteremia. He also has chronically infected hernia mesh with mrsa but it is not making him ill. Patient can go on po levaquin now for another 8 days but must f/u with gi and id after d/c. he will see his own docs at [place] but offered to f/u if he changes his mind.   Plan:  can change to po levaquin up d/c to finish more days of antibiotics  gi and id and cardiology f/u

## 2024-09-18 NOTE — DISCHARGE NOTE PROVIDER - PROVIDER TOKENS
PROVIDER:[TOKEN:[[id number]],FOLLOWUP:[1 week]],PROVIDER:[TOKEN:[[id number]],FOLLOWUP:[1 week]],PROVIDER:[TOKEN:[[id number]],FOLLOWUP:[2 weeks]] PROVIDER:[TOKEN:[[id number]],FOLLOWUP:[1 week]],PROVIDER:[TOKEN:[[id number]],FOLLOWUP:[1 week]],PROVIDER:[TOKEN:[[id number]],FOLLOWUP:[2 weeks]],PROVIDER:[TOKEN:[[id number]],ESTABLISHEDPATIENT:[T]]

## 2024-09-18 NOTE — DISCHARGE NOTE PROVIDER - HOSPITAL COURSE
Hospital Course  HPI:  80 yo male with pmhx of HTN, RCC s/p partial nephrectomy (2014), gastric cancer s/p gastrectomy (2014), bowel perforation (2019), TIA (2/2024), SVT s/p ablation, established with cardiology recommending pacemaker for sinus pauses presenting to [facility] with flu-like symptoms. Symptoms started yesterday, fever 102-103, chills, and body aches. Home COVID test was negative. Was prescribed Tamiflu earlier today after virtual urgent care visit, took one dose prior to arrival. He was not able to keep fluids down which is what prompted him to come to the ED. Denies nausea, vomiting, diarrhea, SOB, coughing, wheezing, rhinorrhea. Denies dysuria but does note decreased urine output despite receiving fluids in the ED. Urine collected appears dark yellow. Has not received flu vaccine this season. (13 Sep 2024 22:14)      You were admitted for   You were diagnosed with   You were treated with   You were prescribed the following new medications:    You will need to follow up with your primary care physician.    Source of Infection:  Antibiotic / Last Day:    Palliative Care / Advanced Care Planning  Code Status:  Patient/Family agreeable to Hospice/Palliative (Y/N)?  Summary of Goals of Care Conversation:    Discharging Provider:  Dr. Kalpana Lew  Contact Info: Cell [phone]- Please call with any questions or concerns.    Outpatient Provider:    Signout given to  SNF Provider:       Hospital Course  HPI:  78 yo male with pmhx of HTN, RCC s/p partial nephrectomy (2014), gastric cancer s/p gastrectomy (2014), bowel perforation (2019), TIA (2/2024), SVT s/p ablation, established with cardiology recommending pacemaker for sinus pauses presenting to [facility] with flu-like symptoms. Symptoms started yesterday, fever 102-103, chills, and body aches. Home COVID test was negative. Was prescribed Tamiflu earlier today after virtual urgent care visit, took one dose prior to arrival. He was not able to keep fluids down which is what prompted him to come to the ED. Denies nausea, vomiting, diarrhea, SOB, coughing, wheezing, rhinorrhea. Denies dysuria but does note decreased urine output despite receiving fluids in the ED. Urine collected appears dark yellow. Has not received flu vaccine this season. (13 Sep 2024 22:14)    Pt was admitted for severe sepsis possibly 2/2  vs GI Source and Kleb Bacteremia. He was seen by ID, treated with antibiotics and labs improved. Procal 40 downtrended to 5. Flu/covid/RSV neg; CXR and CTAP unremarkable. Wound culture from drainage at abdominal site was positive MRSA - pt reports site w. poor healing since 2019 surgical procedure (p/s has followed up outpt w. Surgery and ID- was informed wound would always be open so long mesh in place). Contact precautions ordered. Pt had transaminitis, GI was consulted, MRCP showed 1.2 cm cystic focus left hepatic lobe. No biliary obstruction. Hiatal hernia containing a segment of colon and a midline ventral hernia containing a small bowel loop. US abd was unremarkable. Transaminitis- resolved. Pt also had an elevated proBNP, trop neg; Cards was consulted-Elevation in BNP may reflect a fluid avid state such as a low albumin. 9.14 TTE EF 60-65%, grade I diastolic dysfunction. Pt remained hemodynamically stable and repeat BCx were negative. He was instructed to continue abx for another 8 days with ID and GI f/u outpatient.      Source of Infection: Klebsiella Bacteremia  Antibiotic / Last Day: 9/26      Discharging Provider:  Dr. Kalpana Lew  Contact Info: Cell [phone]- Please call with any questions or concerns.    Outpatient Provider: Dr. Aline Jose - office notified Hospital Course  HPI:  78 yo male with pmhx of HTN, RCC s/p partial nephrectomy (2014), gastric cancer s/p gastrectomy (2014), bowel perforation (2019), TIA (2/2024), SVT s/p ablation, established with cardiology recommending pacemaker for sinus pauses presenting to [facility] with flu-like symptoms. Symptoms started yesterday, fever 102-103, chills, and body aches. Home COVID test was negative. Was prescribed Tamiflu earlier today after virtual urgent care visit, took one dose prior to arrival. He was not able to keep fluids down which is what prompted him to come to the ED. Denies nausea, vomiting, diarrhea, SOB, coughing, wheezing, rhinorrhea. Denies dysuria but does note decreased urine output despite receiving fluids in the ED. Urine collected appears dark yellow. Has not received flu vaccine this season. (13 Sep 2024 22:14)    Pt was admitted for severe sepsis possibly 2/2  vs GI Source and Kleb Bacteremia. He was seen by ID, treated with antibiotics and labs improved. Procal 40 downtrended to 5. Flu/covid/RSV neg; CXR and CTAP unremarkable. Wound culture from drainage at abdominal site was positive MRSA - pt reports site w. poor healing since 2019 surgical procedure (p/s has followed up outpt w. Surgery and ID- was informed wound would always be open so long mesh in place). Contact precautions ordered. Pt had transaminitis, GI was consulted, MRCP showed 1.2 cm cystic focus left hepatic lobe. No biliary obstruction. Hiatal hernia containing a segment of colon and a midline ventral hernia containing a small bowel loop. US abd was unremarkable. Transaminitis- resolved. Pt also had an elevated proBNP, trop neg; Cards was consulted-Elevation in BNP may reflect a fluid avid state such as a low albumin. 9.14 TTE EF 60-65%, grade I diastolic dysfunction. Pt remained hemodynamically stable and repeat BCx were negative. He was instructed to continue abx for another 8 days with ID and GI f/u outpatient.    on 09/18 patient was found to have palpitations wiht flushing and then briefly had palpitations, denies chest pain or shortness of breath. he was seen by cardio who kept him on telemetry overnight and cleared him for discharge on 09/19  pt aware to follow up with ID, GI and cardio upon dischagre.     Source of Infection: Klebsiella Bacteremia  Antibiotic / Last Day: 9/26      Discharging Provider:  Dr. Kalpana Lew  Contact Info: Cell [phone]- Please call with any questions or concerns.    Outpatient Provider: Dr. Aline Jose - office notified

## 2024-09-18 NOTE — PROGRESS NOTE ADULT - ASSESSMENT
78 yo male with pmhx of HTN, RCC s/p partial nephrectomy (2014), gastric cancer s/p gastrectomy (2014), bowel perforation (2019), TIA (2/2024), SVT s/p ablation, established with cardiology recommending pacemaker for sinus pauses presenting to [facility] with flu-like symptoms admitted for sepsis- source unkn.     *Severe sepsis possibly 2/2  vs GI Source - improved  *Kleb Bacteremia  *Hypotensive briefly w/ MAP <60, resolved  *Elevated Lactate -resolved  *Dehydration- improved   -Procal 40 downtrended to 5; neg leukocytosis  -Flu/covid/RSV neg; CXR and CTAP unremarkable  -S/p levofloxacin in ED; Started on Vanc/Zosyn (hx of MRSA)- change to IV CTX as per ID. Plan for another 8 days levaquin PO per Dr. Wan if discharged  -UCx: Neg  - BldCx: +GNR- Kleb Pna   - wound culture from drainage at abdominal site- positive MRSA - pt reports site w. poor healing since 2019 surgical procedure (p/s has followed up outpt w. Surgery and ID- was informed wound would always be open so long mesh in place). Contact precautions ordered  -ID consulted - recs appreciated and reviewed   -Repeat 9.15 BldCxs NGTD  - MRCP with 1.2 cm cystic focus left hepatic lobe. No biliary obstruction. Hiatal hernia containing a segment of colon and a midline ventral hernia containing a small bowel loop.  - GI consulted - Out patient GI F/U.    Palpitations/Dizziness, r/o SVT  - pt felt dizzy this afternoon  - Spoke with Dr. Pacheco who rec obs overnight on tele with labs in AM. ordered BMP, Mg, Phos and tele monitoring    *Hyponatremia, Hypophosphatemia, Hypomagnesemia- all resolved     *Transaminitis- resolved   -Likely due to sepsis but also consider congestive hepatopathy   -CT unremarkable  -RUQ sonogram- unremarkable  -GI consulted- rec Out patient GI F/U.    *Elevated proBNP  -BNP 1292  -9.14 TTE EF 60-65%, grade I diastolic dysfunction   -Trop neg  -Cards consulted-Elevation in BNP may reflect a fluid avid state such as a low albumin . echo done recently evaluate LV function shows evidence for diastolic dysfunction . ? consider Farxiga if renal indices allow.    *HTN  -home Amlodipine held due to low BPs- resumed at 5mg     *Hx of TIA  -Cont home Aspirin and ezetimibe     *DVT ppx  On Heparin    GOC/Code Status: Full Code     Dispo: Pending clinical course as above.   Discussed treatment plan with pt at bedside and pt was in agreement with the plan. All questions answered.   Updated pt's wife 9/18 78 yo male with pmhx of HTN, RCC s/p partial nephrectomy (2014), gastric cancer s/p gastrectomy (2014), bowel perforation (2019), TIA (2/2024), SVT s/p ablation, established with cardiology recommending pacemaker for sinus pauses presenting to [facility] with flu-like symptoms admitted for sepsis- source unkn.     *Severe sepsis possibly 2/2  vs GI Source - improved  *Kleb Bacteremia  *Hypotensive briefly w/ MAP <60, resolved  *Elevated Lactate -resolved  *Dehydration- improved   -Procal 40 downtrended to 5; neg leukocytosis  -Flu/covid/RSV neg; CXR and CTAP unremarkable  -S/p levofloxacin in ED; Started on Vanc/Zosyn (hx of MRSA)- change to IV CTX as per ID. Plan for another 8 days levaquin PO per Dr. Wan if discharged  -UCx: Neg  - BldCx: +GNR- Kleb Pna   - wound culture from drainage at abdominal site- positive MRSA - pt reports site w. poor healing since 2019 surgical procedure (p/s has followed up outpt w. Surgery and ID- was informed wound would always be open so long mesh in place). Contact precautions ordered  -ID consulted - recs appreciated and reviewed   -Repeat 9.15 BldCxs NGTD  - MRCP with 1.2 cm cystic focus left hepatic lobe. No biliary obstruction. Hiatal hernia containing a segment of colon and a midline ventral hernia containing a small bowel loop.  - GI consulted - Out patient GI F/U.    Palpitations/Dizziness, r/o SVT  - pt felt dizzy this afternoon  - Spoke with Dr. Pachceo who rec obs overnight on tele with labs in AM. ordered BMP, Mg, Phos and tele monitoring    *Hyponatremia, Hypophosphatemia, Hypomagnesemia- all resolved     *Transaminitis- resolved   -Likely due to sepsis but also consider congestive hepatopathy   -CT unremarkable  -RUQ sonogram- unremarkable  -GI consulted- rec Out patient GI F/U.    *Elevated proBNP  -BNP 1292  -9.14 TTE EF 60-65%, grade I diastolic dysfunction   -Trop neg  -Cards consulted-Elevation in BNP may reflect a fluid avid state such as a low albumin . echo done recently evaluate LV function shows evidence for diastolic dysfunction . ? consider Farxiga if renal indices allow.    *HTN  -home Amlodipine initially held due to low BPs- resumed home dose for tomorrow    *Hx of TIA  -Cont home Aspirin and ezetimibe     *DVT ppx  On Heparin    GOC/Code Status: Full Code     Dispo: Pending clinical course as above.   Discussed treatment plan with pt at bedside and pt was in agreement with the plan. All questions answered.   Updated pt's wife 9/18

## 2024-09-18 NOTE — DISCHARGE NOTE PROVIDER - CARE PROVIDER_API CALL
Aline Jose  Internal Medicine  [address], [address]  [place] [address]  Phone: [phone]  Fax: [phone]  Follow Up Time: 1 week    Abraham Wan  Infectious Disease  [address], [address]  [place] [address]  Phone: [phone]  Fax: [phone]  Follow Up Time: 1 week    Skip Tellez  Gastroenterology  [address] [place] [address], [address]  [place] [address]  Phone: [phone]  Fax: [phone]  Follow Up Time: 2 weeks   Aline Jose  Internal Medicine  [address], [address]  [place] [address]  Phone: [phone]  Fax: [phone]  Follow Up Time: 1 week    Abraham Wan  Infectious Disease  [address], [address]  [place] [address]  Phone: [phone]  Fax: [phone]  Follow Up Time: 1 week    Skip Tellez  Gastroenterology  [address], [address]  [place] [address]  Phone: [phone]  Fax: [phone]  Follow Up Time: 2 weeks    TENNILLE WANG  [address] [place] [id number]  Phone: [phone]  Fax: [phone]  Established Patient  Follow Up Time:

## 2024-09-18 NOTE — PROGRESS NOTE ADULT - SUBJECTIVE AND OBJECTIVE BOX
MILAD NANCY  [id number]      Cardiology re-consulted due to palpitations/PVC    Chief Complaint: Sepsis    Interval History:    Tele:      Current meds:   acetaminophen     Tablet .. 650 milliGRAM(s) Oral every 6 hours PRN  aluminum hydroxide/magnesium hydroxide/simethicone Suspension 30 milliLiter(s) Oral every 4 hours PRN  amLODIPine   Tablet 5 milliGRAM(s) Oral daily  aspirin  chewable 81 milliGRAM(s) Oral daily  cefTRIAXone   IVPB      cefTRIAXone   IVPB 2000 milliGRAM(s) IV Intermittent every 24 hours  ezetimibe 10 milliGRAM(s) Oral daily  heparin   Injectable 5000 Unit(s) SubCutaneous every 12 hours  melatonin 3 milliGRAM(s) Oral at bedtime PRN  ondansetron Injectable 4 milliGRAM(s) IV Push every 8 hours PRN      Objective:     Vital Signs:   T(C): 36.5 (09-18-24 @ 04:54), Max: 36.8 (09-17-24 @ 13:44)  HR: 56 (09-18-24 @ 04:54) (50 - 57)  BP: 131/78 (09-18-24 @ 04:54) (131/78 - 140/79)  RR: 17 (09-18-24 @ 04:54) (16 - 17)  SpO2: 95% (09-18-24 @ 04:54) (94% - 95%)  Wt(kg): --    Physical Exam:   General: Comfortable in NAD  Neck: No JVD  CVS: nl s1s2, no s3  Pulm: CTA b/l  Abd: soft, non-tender  Ext: No c/c/e  Neuro A&O x3  Psych: Normal affect      Labs:   18 Sep 2024 07:18    142    |  106    |  18     ----------------------------<  105    4.0     |  27     |  0.94     Ca    8.7        18 Sep 2024 07:18    TPro  6.7    /  Alb  2.9    /  TBili  0.4    /  DBili  x      /  AST  30     /  ALT  50     /  AlkPhos  137    18 Sep 2024 07:18                          12.4   6.73  )-----------( 178      ( 18 Sep 2024 07:18 )             36.4           TTE (9/14/24):  LVEF 60-65%, mild LVH  Normal right ventricular size and function    ECG (9/18/24): sinus rhythm, PVC     MILAD CRUZ  [id number]      Cardiology re-consulted due to palpitations/PVC    Chief Complaint: Sepsis    Interval History: The patient reports that he felt flushing and then briefly had palpitations, denies chest pain or shortness of breath.     Tele: not on telemetry       Current meds:   acetaminophen     Tablet .. 650 milliGRAM(s) Oral every 6 hours PRN  aluminum hydroxide/magnesium hydroxide/simethicone Suspension 30 milliLiter(s) Oral every 4 hours PRN  amLODIPine   Tablet 5 milliGRAM(s) Oral daily  aspirin  chewable 81 milliGRAM(s) Oral daily  cefTRIAXone   IVPB      cefTRIAXone   IVPB 2000 milliGRAM(s) IV Intermittent every 24 hours  ezetimibe 10 milliGRAM(s) Oral daily  heparin   Injectable 5000 Unit(s) SubCutaneous every 12 hours  melatonin 3 milliGRAM(s) Oral at bedtime PRN  ondansetron Injectable 4 milliGRAM(s) IV Push every 8 hours PRN      Objective:     Vital Signs:   T(C): 36.5 (09-18-24 @ 04:54), Max: 36.8 (09-17-24 @ 13:44)  HR: 56 (09-18-24 @ 04:54) (50 - 57)  BP: 131/78 (09-18-24 @ 04:54) (131/78 - 140/79)  RR: 17 (09-18-24 @ 04:54) (16 - 17)  SpO2: 95% (09-18-24 @ 04:54) (94% - 95%)  Wt(kg): --    Physical Exam:   General: no acute distress  Neck: supple   CVS: JVP ~ 7 cm H20, RRR, s1, s2  Pulm: unlabored respirations, clear to ausculation   Ext: no lower extremity edema b/l   Neuro: awake, alert and oriented  Psych: Normal affect      Labs:   18 Sep 2024 07:18    142    |  106    |  18     ----------------------------<  105    4.0     |  27     |  0.94     Ca    8.7        18 Sep 2024 07:18    TPro  6.7    /  Alb  2.9    /  TBili  0.4    /  DBili  x      /  AST  30     /  ALT  50     /  AlkPhos  137    18 Sep 2024 07:18                          12.4   6.73  )-----------( 178      ( 18 Sep 2024 07:18 )             36.4         TTE (9/14/24):  LVEF 60-65%, mild LVH  Normal right ventricular size and function    ECG (9/18/24): sinus rhythm, PVC

## 2024-09-18 NOTE — DISCHARGE NOTE PROVIDER - NSDCCPCAREPLAN_GEN_ALL_CORE_FT
PRINCIPAL DISCHARGE DIAGNOSIS  Diagnosis: Bacteremia  Assessment and Plan of Treatment: You were found to have Klebsiella bacteria in your blood and were treated with antibiotics. Your blood levels improved and you are to continue the antibiotics as instructed. Please follow up with your PCP in 1 week.      SECONDARY DISCHARGE DIAGNOSES  Diagnosis: Transaminitis  Assessment and Plan of Treatment: You liver function tests were elevated. You were seen by GI and your MRI of the abdomen and ultrasound were stable/not significant.     PRINCIPAL DISCHARGE DIAGNOSIS  Diagnosis: Bacteremia  Assessment and Plan of Treatment: You were found to have Klebsiella bacteria in your blood and were treated with antibiotics. Your blood levels improved and you are to continue the antibiotics as instructed, last day 9/26. Please follow up with your PCP in 1 week as well as the infectious disease doctor.      SECONDARY DISCHARGE DIAGNOSES  Diagnosis: Transaminitis  Assessment and Plan of Treatment: You liver function tests were elevated. You were seen by GI and your MRI of the abdomen and ultrasound were stable/not significant. Please follow up with GI in 1-2 weeks.     PRINCIPAL DISCHARGE DIAGNOSIS  Diagnosis: Bacteremia  Assessment and Plan of Treatment: You were found to have Klebsiella bacteria in your blood and were treated with antibiotics. Your blood levels improved and you are to continue the antibiotics as instructed, last day 9/26. Please follow up with your PCP in 1 week as well as the infectious disease doctor.      SECONDARY DISCHARGE DIAGNOSES  Diagnosis: Transaminitis  Assessment and Plan of Treatment: You liver function tests were elevated. You were seen by GI and your MRI of the abdomen and ultrasound were stable/not significant. Please follow up with GI in 1-2 weeks.    Diagnosis: Palpitations  Assessment and Plan of Treatment: follow up with cardiology and Dr. Schultz on Discharge

## 2024-09-19 VITALS
DIASTOLIC BLOOD PRESSURE: 72 MMHG | RESPIRATION RATE: 16 BRPM | HEART RATE: 65 BPM | OXYGEN SATURATION: 96 % | SYSTOLIC BLOOD PRESSURE: 126 MMHG | TEMPERATURE: 98 F

## 2024-09-19 LAB
ANION GAP SERPL CALC-SCNC: 9 MMOL/L — SIGNIFICANT CHANGE UP (ref 5–17)
BUN SERPL-MCNC: 19 MG/DL — SIGNIFICANT CHANGE UP (ref 7–23)
CALCIUM SERPL-MCNC: 8.9 MG/DL — SIGNIFICANT CHANGE UP (ref 8.4–10.5)
CHLORIDE SERPL-SCNC: 107 MMOL/L — SIGNIFICANT CHANGE UP (ref 96–108)
CO2 SERPL-SCNC: 26 MMOL/L — SIGNIFICANT CHANGE UP (ref 22–31)
CREAT SERPL-MCNC: 0.88 MG/DL — SIGNIFICANT CHANGE UP (ref 0.5–1.3)
EGFR: 87 ML/MIN/1.73M2 — SIGNIFICANT CHANGE UP
GLUCOSE SERPL-MCNC: 110 MG/DL — HIGH (ref 70–99)
MAGNESIUM SERPL-MCNC: 2.1 MG/DL — SIGNIFICANT CHANGE UP (ref 1.6–2.6)
PHOSPHATE SERPL-MCNC: 3.5 MG/DL — SIGNIFICANT CHANGE UP (ref 2.5–4.5)
POTASSIUM SERPL-MCNC: 4.3 MMOL/L — SIGNIFICANT CHANGE UP (ref 3.5–5.3)
POTASSIUM SERPL-SCNC: 4.3 MMOL/L — SIGNIFICANT CHANGE UP (ref 3.5–5.3)
SODIUM SERPL-SCNC: 142 MMOL/L — SIGNIFICANT CHANGE UP (ref 135–145)
TROPONIN I, HIGH SENSITIVITY RESULT: <4 NG/L — SIGNIFICANT CHANGE UP

## 2024-09-19 PROCEDURE — 99231 SBSQ HOSP IP/OBS SF/LOW 25: CPT | Mod: FS

## 2024-09-19 PROCEDURE — 99239 HOSP IP/OBS DSCHRG MGMT >30: CPT

## 2024-09-19 RX ORDER — EZETIMIBE 10 MG/1
1 TABLET ORAL
Refills: 0 | DISCHARGE

## 2024-09-19 RX ADMIN — EZETIMIBE 10 MILLIGRAM(S): 10 TABLET ORAL at 12:03

## 2024-09-19 RX ADMIN — Medication 81 MILLIGRAM(S): at 12:00

## 2024-09-19 RX ADMIN — AMLODIPINE BESYLATE 10 MILLIGRAM(S): 10 TABLET ORAL at 06:14

## 2024-09-19 NOTE — PROGRESS NOTE ADULT - SUBJECTIVE AND OBJECTIVE BOX
PROGRESS NOTE:     Patient is a 79y old  Male who presents with a chief complaint of severe sepsis 2/2 viral vs bacterial pneumonia (18 Sep 2024 13:43)      SUBJECTIVE / OVERNIGHT EVENTS: pt was seen and evaluated today.  Feels well. no complains offered     ADDITIONAL REVIEW OF SYSTEMS:    MEDICATIONS  (STANDING):  amLODIPine   Tablet 10 milliGRAM(s) Oral daily  aspirin  chewable 81 milliGRAM(s) Oral daily  cefTRIAXone   IVPB 2000 milliGRAM(s) IV Intermittent every 24 hours  cefTRIAXone   IVPB      ezetimibe 10 milliGRAM(s) Oral daily  heparin   Injectable 5000 Unit(s) SubCutaneous every 12 hours    MEDICATIONS  (PRN):  acetaminophen     Tablet .. 650 milliGRAM(s) Oral every 6 hours PRN Temp greater or equal to 38C (100.4F), Mild Pain (1 - 3)  aluminum hydroxide/magnesium hydroxide/simethicone Suspension 30 milliLiter(s) Oral every 4 hours PRN Dyspepsia  melatonin 3 milliGRAM(s) Oral at bedtime PRN Insomnia  ondansetron Injectable 4 milliGRAM(s) IV Push every 8 hours PRN Nausea and/or Vomiting      CAPILLARY BLOOD GLUCOSE        I&O's Summary    18 Sep 2024 07:01  -  19 Sep 2024 07:00  --------------------------------------------------------  IN: 530 mL / OUT: 0 mL / NET: 530 mL        PHYSICAL EXAM:  Vital Signs Last 24 Hrs  T(C): 36.8 (19 Sep 2024 05:16), Max: 36.9 (18 Sep 2024 19:45)  T(F): 98.3 (19 Sep 2024 05:16), Max: 98.4 (18 Sep 2024 19:45)  HR: 60 (19 Sep 2024 05:16) (56 - 66)  BP: 120/75 (19 Sep 2024 05:16) (120/75 - 136/81)  BP(mean): --  RR: 16 (19 Sep 2024 05:16) (16 - 16)  SpO2: 94% (19 Sep 2024 05:16) (94% - 96%)    Parameters below as of 18 Sep 2024 19:45  Patient On (Oxygen Delivery Method): room air      PHYSICAL EXAM:  GENERAL: pt laying in bed in NAD  HEENT: NC/AT, MMM  HENT: NC/AT, mmm  PULMONARY: nonlabored breathing, no resp distress  GASTROINTESTINAL: soft nontender    MUSCULOSKELETAL: no cyanosis, no edema to BLE  Psych: A&Ox3, appropriate affec    LABS:                        12.4   6.73  )-----------( 178      ( 18 Sep 2024 07:18 )             36.4     09-19    142  |  107  |  19  ----------------------------<  110[H]  4.3   |  26  |  0.88    Ca    8.9      19 Sep 2024 08:19  Phos  3.5     09-19  Mg     2.1     09-19    TPro  6.7  /  Alb  2.9[L]  /  TBili  0.4  /  DBili  x   /  AST  30  /  ALT  50[H]  /  AlkPhos  137[H]  09-18          Urinalysis Basic - ( 19 Sep 2024 08:19 )    Color: x / Appearance: x / SG: x / pH: x  Gluc: 110 mg/dL / Ketone: x  / Bili: x / Urobili: x   Blood: x / Protein: x / Nitrite: x   Leuk Esterase: x / RBC: x / WBC x   Sq Epi: x / Non Sq Epi: x / Bacteria: x          RADIOLOGY & ADDITIONAL TESTS:  Results Reviewed: yes  Imaging Personally Reviewed: yes  Electrocardiogram Personally Reviewed: yes    COORDINATION OF CARE:  Care Discussed with Consultants/Other Providers [Y/N]: yes  Prior or Outpatient Records Reviewed [Y/N]: yes

## 2024-09-19 NOTE — PROGRESS NOTE ADULT - ASSESSMENT
Assessment:79 year old man with past medical history of Hypertension, SVT (s/p ablation), TIA, Renal cell cancer (s/p partial nephrectomy) and Gastric cancer (s/p gastrectomy) initially presented with fever and body aches, found to have sepsis secondary to Klebsiella pneumoniae, concerning for biliary source.     Cardiology re-consulted today due to patient reporting palpitations. The patient reports that he felt flushing and then briefly had palpitations, denies chest pain or shortness of breath. ECG today consistent with sinus rhythm and PVC. Troponins negative x 2. Echo report with normal LVEF 60-65%. Telemetry not on patient at this time.     Recommendations:  - pt monitored overnight on tele. no significant events  - Would avoid AV jeromy blockers at this time as patient reports he will be having a pacemaker placed with his cardiac EP at [facility] soon due to pauses - recommended patient to have Infectious Disease clearance prior to PPM placement given this admission with bacteremia (although now repeat blood cultures negative), patient agrees.     pt CV stable for DC

## 2024-09-19 NOTE — PROGRESS NOTE ADULT - ASSESSMENT
80 yo male with pmhx of HTN, RCC s/p partial nephrectomy (2014), gastric cancer s/p gastrectomy (2014), bowel perforation (2019), TIA (2/2024), SVT s/p ablation, established with cardiology recommending pacemaker for sinus pauses presenting to [facility] with flu-like symptoms admitted for sepsis- source unkn.     *Severe sepsis possibly 2/2  vs GI Source - improved  *Kleb Bacteremia  *Hypotensive briefly w/ MAP <60, resolved  *Elevated Lactate -resolved  *Dehydration- improved   -Procal 40 downtrended to 5; neg leukocytosis  -Flu/covid/RSV neg; CXR and CTAP unremarkable  -S/p levofloxacin in ED; Started on Vanc/Zosyn (hx of MRSA)- change to IV CTX as per ID. Plan for another 8 days levaquin PO per Dr. Wan if discharged  -UCx: Neg  - BldCx: +GNR- Kleb Pna   - wound culture from drainage at abdominal site- positive MRSA - pt reports site w. poor healing since 2019 surgical procedure (p/s has followed up outpt w. Surgery and ID- was informed wound would always be open so long mesh in place). Contact precautions ordered  -ID consulted - recs appreciated and reviewed   -Repeat 9.15 BldCxs NGTD  - MRCP with 1.2 cm cystic focus left hepatic lobe. No biliary obstruction. Hiatal hernia containing a segment of colon and a midline ventral hernia containing a small bowel loop.  - GI consulted - Out patient GI F/U.    Palpitations/Dizziness, r/o SVT  - pt felt dizzy this afternoon  - Spoke with Dr. Pacheco who rec obs overnight on tele with labs in AM. ordered BMP, Mg, Phos and tele monitoring    *Hyponatremia, Hypophosphatemia, Hypomagnesemia- all resolved     *Transaminitis- resolved   -Likely due to sepsis but also consider congestive hepatopathy   -CT unremarkable  -RUQ sonogram- unremarkable  -GI consulted- rec Out patient GI F/U.    *Elevated proBNP  -BNP 1292  -9.14 TTE EF 60-65%, grade I diastolic dysfunction   -Trop neg  -Cards consulted-Elevation in BNP may reflect a fluid avid state such as a low albumin . echo done recently evaluate LV function shows evidence for diastolic dysfunction . ? consider Farxiga if renal indices allow.    *HTN  -home Amlodipine initially held due to low BPs- resumed home dose for tomorrow    *Hx of TIA  -Cont home Aspirin and ezetimibe     *DVT ppx  On Heparin    GOC/Code Status: Full Code     Dispo: Pending clinical course as above.   Discussed treatment plan with pt at bedside and pt was in agreement with the plan. All questions answered.  78 yo male with pmhx of HTN, RCC s/p partial nephrectomy (2014), gastric cancer s/p gastrectomy (2014), bowel perforation (2019), TIA (2/2024), SVT s/p ablation, established with cardiology recommending pacemaker for sinus pauses presenting to [facility] with flu-like symptoms admitted for sepsis- source unkn.     *Severe sepsis possibly 2/2  vs GI Source - improved  *Kleb Bacteremia  *Hypotensive briefly w/ MAP <60, resolved  *Elevated Lactate -resolved  *Dehydration- improved   -Procal 40 downtrended to 5; neg leukocytosis  -Flu/covid/RSV neg; CXR and CTAP unremarkable  -S/p levofloxacin in ED; Started on Vanc/Zosyn (hx of MRSA)- change to IV CTX as per ID. Plan for another 8 days levaquin PO per Dr. Wan if discharged  -UCx: Neg  - BldCx: +GNR- Kleb Pna   - wound culture from drainage at abdominal site- positive MRSA - pt reports site w. poor healing since 2019 surgical procedure (p/s has followed up outpt w. Surgery and ID- was informed wound would always be open so long mesh in place). Contact precautions ordered  -ID consulted - recs appreciated and reviewed   -Repeat 9.15 BldCxs NGTD  - MRCP with 1.2 cm cystic focus left hepatic lobe. No biliary obstruction. Hiatal hernia containing a segment of colon and a midline ventral hernia containing a small bowel loop.  - GI consulted - Out patient GI F/U.    Palpitations/Dizziness, r/o SVT  - pt felt dizzy 09/18  - Spoke with Dr. Pacheco who rec obs overnight on tele with labs in AM which is unremarkable  Dr. Hargrove asked to follow up today    *Hyponatremia, Hypophosphatemia, Hypomagnesemia- all resolved     *Transaminitis- resolved   -Likely due to sepsis but also consider congestive hepatopathy   -CT unremarkable  -RUQ sonogram- unremarkable  -GI consulted- rec Out patient GI F/U.    *Elevated proBNP  -BNP 1292  -9.14 TTE EF 60-65%, grade I diastolic dysfunction   -Trop neg  -Cards consulted-Elevation in BNP may reflect a fluid avid state such as a low albumin . echo done recently evaluate LV function shows evidence for diastolic dysfunction . ? consider Farxiga if renal indices allow.    *HTN  -home Amlodipine initially held due to low BPs- resumed home dose for tomorrow    *Hx of TIA  -Cont home Aspirin and ezetimibe     *DVT ppx  On Heparin    GOC/Code Status: Full Code     Dispo: Pending clinical course as above.   Discussed treatment plan with pt at bedside and pt was in agreement with the plan. All questions answered.  78 yo male with pmhx of HTN, RCC s/p partial nephrectomy (2014), gastric cancer s/p gastrectomy (2014), bowel perforation (2019), TIA (2/2024), SVT s/p ablation, established with cardiology recommending pacemaker for sinus pauses presenting to [facility] with flu-like symptoms admitted for sepsis- source unkn.     *Severe sepsis possibly 2/2  vs GI Source - improved  *Kleb Bacteremia  *Hypotensive briefly w/ MAP <60, resolved  *Elevated Lactate -resolved  *Dehydration- improved   -Procal 40 downtrended to 5; neg leukocytosis  -Flu/covid/RSV neg; CXR and CTAP unremarkable  -S/p levofloxacin in ED; Started on Vanc/Zosyn (hx of MRSA)- change to IV CTX as per ID. Plan for another 8 days levaquin PO per Dr. Wan if discharged  -UCx: Neg  - BldCx: +GNR- Kleb Pna   - wound culture from drainage at abdominal site- positive MRSA - pt reports site w. poor healing since 2019 surgical procedure (p/s has followed up outpt w. Surgery and ID- was informed wound would always be open so long mesh in place). Contact precautions ordered  -ID consulted - recs appreciated and reviewed   -Repeat 9.15 BldCxs NGTD  - MRCP with 1.2 cm cystic focus left hepatic lobe. No biliary obstruction. Hiatal hernia containing a segment of colon and a midline ventral hernia containing a small bowel loop.  - GI consulted - Out patient GI F/U.    Palpitations/Dizziness, r/o SVT  - pt felt dizzy 09/18  - Spoke with Dr. Pacheco who rec obs overnight on tele with labs in AM which is unremarkable  Dr. Hargrove asked to follow up today    *Hyponatremia, Hypophosphatemia, Hypomagnesemia- all resolved     *Transaminitis- resolved   -Likely due to sepsis but also consider congestive hepatopathy   -CT unremarkable  -RUQ sonogram- unremarkable  -GI consulted- rec Out patient GI F/U.    *Elevated proBNP  -BNP 1292  -9.14 TTE EF 60-65%, grade I diastolic dysfunction   -Trop neg  -Cards consulted-Elevation in BNP may reflect a fluid avid state such as a low albumin . echo done recently evaluate LV function shows evidence for diastolic dysfunction . ? consider Farxiga if renal indices allow.    *HTN  -home Amlodipine initially held due to low BPs- resumed home dose for tomorrow    *Hx of TIA  -Cont home Aspirin and ezetimibe     *DVT ppx  On Heparin    GOC/Code Status: Full Code      spoke to bhavesh and his wife on 09/19 agreeble with discharge plan   Dispo: Pending clinical course as above.   Discussed treatment plan with pt at bedside and pt was in agreement with the plan. All questions answered.

## 2024-09-19 NOTE — PROGRESS NOTE ADULT - SUBJECTIVE AND OBJECTIVE BOX
MILAD NANCY  [id number]      Cardiology re-consulted due to palpitations/PVC    Chief Complaint: Sepsis    Interval History: denies chest pain, palps, dizziness or shortness of breath.     Tele: sinus 60s      Current meds:   acetaminophen     Tablet .. 650 milliGRAM(s) Oral every 6 hours PRN  aluminum hydroxide/magnesium hydroxide/simethicone Suspension 30 milliLiter(s) Oral every 4 hours PRN  amLODIPine   Tablet 5 milliGRAM(s) Oral daily  aspirin  chewable 81 milliGRAM(s) Oral daily  cefTRIAXone   IVPB      cefTRIAXone   IVPB 2000 milliGRAM(s) IV Intermittent every 24 hours  ezetimibe 10 milliGRAM(s) Oral daily  heparin   Injectable 5000 Unit(s) SubCutaneous every 12 hours  melatonin 3 milliGRAM(s) Oral at bedtime PRN  ondansetron Injectable 4 milliGRAM(s) IV Push every 8 hours PRN      Objective:     Vital Signs:   T(C): 36.5 (09-18-24 @ 04:54), Max: 36.8 (09-17-24 @ 13:44)  HR: 56 (09-18-24 @ 04:54) (50 - 57)  BP: 131/78 (09-18-24 @ 04:54) (131/78 - 140/79)  RR: 17 (09-18-24 @ 04:54) (16 - 17)  SpO2: 95% (09-18-24 @ 04:54) (94% - 95%)  Wt(kg): --    Physical Exam:   General: no acute distress  Neck: supple   CVS: JVP ~ 7 cm H20, RRR, s1, s2  Pulm: unlabored respirations, clear to ausculation   Ext: no lower extremity edema b/l   Neuro: awake, alert and oriented  Psych: Normal affect      Labs:   18 Sep 2024 07:18    142    |  106    |  18     ----------------------------<  105    4.0     |  27     |  0.94     Ca    8.7        18 Sep 2024 07:18    TPro  6.7    /  Alb  2.9    /  TBili  0.4    /  DBili  x      /  AST  30     /  ALT  50     /  AlkPhos  137    18 Sep 2024 07:18                          12.4   6.73  )-----------( 178      ( 18 Sep 2024 07:18 )             36.4         TTE (9/14/24):  LVEF 60-65%, mild LVH  Normal right ventricular size and function    ECG (9/18/24): sinus rhythm, PVC

## 2024-09-19 NOTE — PROGRESS NOTE ADULT - NS ATTEND AMEND GEN_ALL_CORE FT
reviewed AM labs - stable with no leukocytosis. LFTs downtrending - pt asymptomatic. Will repeat BCx tonight and f/u ID recs tomorrow for abx. F/u UCx
I attest my time as attending was greater than 50% of the total time of 50 min on patient care on this DOS. Time spent includes review of hospital course, labs, vitals, medical records, patient evaluation, contact with family (when present), discussion of plan with the primary team, coordinating services and documenting encounter.
D/w patient and Ms. Edward, cardiologically awaits discharge.
reviewed AM labs - neg leukocytosis, +BCx for Kleb noted. TTE reviewed w/o vegetations and abd US ordered for elevated transaminases - reviewed w/o acute pathology. will c/w broad spectrum abx and f/u UCx/BCx  F/u cardio and ID consults
reviewed AM labs - stable. spoke with Dr. Wan - marco antonio abx to ceftriaxone  GI consulted. Ordered MRCP.   will f/u BCx and c/w abx per ID recs. f/u am procal ordered

## 2024-09-19 NOTE — PROGRESS NOTE ADULT - REASON FOR ADMISSION
Severe sepsis 2/2 viral vs bacterial pneumonia
back

## 2024-09-19 NOTE — PROGRESS NOTE ADULT - PROVIDER SPECIALTY LIST ADULT
Hospitalist
Cardiology
Gastroenterology
Hospitalist
Infectious Disease
Cardiology
Hospitalist
Infectious Disease

## 2024-09-20 RX ORDER — EZETIMIBE 10 MG/1
1 TABLET ORAL
Qty: 30 | Refills: 0
Start: 2024-09-20 | End: 2024-10-19

## 2024-09-21 LAB
CULTURE RESULTS: SIGNIFICANT CHANGE UP
CULTURE RESULTS: SIGNIFICANT CHANGE UP
SPECIMEN SOURCE: SIGNIFICANT CHANGE UP
SPECIMEN SOURCE: SIGNIFICANT CHANGE UP

## 2024-09-29 PROCEDURE — 71045 X-RAY EXAM CHEST 1 VIEW: CPT

## 2024-09-29 PROCEDURE — 76705 ECHO EXAM OF ABDOMEN: CPT

## 2024-09-29 PROCEDURE — 85610 PROTHROMBIN TIME: CPT

## 2024-09-29 PROCEDURE — 87077 CULTURE AEROBIC IDENTIFY: CPT

## 2024-09-29 PROCEDURE — 74181 MRI ABDOMEN W/O CONTRAST: CPT | Mod: MC

## 2024-09-29 PROCEDURE — 84484 ASSAY OF TROPONIN QUANT: CPT

## 2024-09-29 PROCEDURE — 81001 URINALYSIS AUTO W/SCOPE: CPT

## 2024-09-29 PROCEDURE — 87186 SC STD MICRODIL/AGAR DIL: CPT

## 2024-09-29 PROCEDURE — 85025 COMPLETE CBC W/AUTO DIFF WBC: CPT

## 2024-09-29 PROCEDURE — 80053 COMPREHEN METABOLIC PANEL: CPT

## 2024-09-29 PROCEDURE — 83735 ASSAY OF MAGNESIUM: CPT

## 2024-09-29 PROCEDURE — 83880 ASSAY OF NATRIURETIC PEPTIDE: CPT

## 2024-09-29 PROCEDURE — 84100 ASSAY OF PHOSPHORUS: CPT

## 2024-09-29 PROCEDURE — 99285 EMERGENCY DEPT VISIT HI MDM: CPT

## 2024-09-29 PROCEDURE — 87070 CULTURE OTHR SPECIMN AEROBIC: CPT

## 2024-09-29 PROCEDURE — 93005 ELECTROCARDIOGRAM TRACING: CPT

## 2024-09-29 PROCEDURE — 82248 BILIRUBIN DIRECT: CPT

## 2024-09-29 PROCEDURE — 80048 BASIC METABOLIC PNL TOTAL CA: CPT

## 2024-09-29 PROCEDURE — 84145 PROCALCITONIN (PCT): CPT

## 2024-09-29 PROCEDURE — 93306 TTE W/DOPPLER COMPLETE: CPT

## 2024-09-29 PROCEDURE — 87150 DNA/RNA AMPLIFIED PROBE: CPT

## 2024-09-29 PROCEDURE — 87086 URINE CULTURE/COLONY COUNT: CPT

## 2024-09-29 PROCEDURE — 85027 COMPLETE CBC AUTOMATED: CPT

## 2024-09-29 PROCEDURE — 96365 THER/PROPH/DIAG IV INF INIT: CPT

## 2024-09-29 PROCEDURE — 36415 COLL VENOUS BLD VENIPUNCTURE: CPT

## 2024-09-29 PROCEDURE — 85730 THROMBOPLASTIN TIME PARTIAL: CPT

## 2024-09-29 PROCEDURE — 96375 TX/PRO/DX INJ NEW DRUG ADDON: CPT

## 2024-09-29 PROCEDURE — 87040 BLOOD CULTURE FOR BACTERIA: CPT

## 2024-09-29 PROCEDURE — 83605 ASSAY OF LACTIC ACID: CPT

## 2024-09-29 PROCEDURE — 80202 ASSAY OF VANCOMYCIN: CPT

## 2024-09-29 PROCEDURE — 87637 SARSCOV2&INF A&B&RSV AMP PRB: CPT

## 2024-09-29 PROCEDURE — 74177 CT ABD & PELVIS W/CONTRAST: CPT | Mod: MC

## 2025-07-01 ENCOUNTER — INPATIENT (INPATIENT)
Facility: HOSPITAL | Age: 80
LOS: 2 days | Discharge: HOME | DRG: 948 | End: 2025-07-04
Attending: STUDENT IN AN ORGANIZED HEALTH CARE EDUCATION/TRAINING PROGRAM | Admitting: INTERNAL MEDICINE
Payer: MEDICARE

## 2025-07-01 ENCOUNTER — NON-APPOINTMENT (OUTPATIENT)
Age: 80
End: 2025-07-01

## 2025-07-01 VITALS
WEIGHT: 169.98 LBS | SYSTOLIC BLOOD PRESSURE: 161 MMHG | DIASTOLIC BLOOD PRESSURE: 88 MMHG | HEIGHT: 69 IN | TEMPERATURE: 98 F | HEART RATE: 67 BPM | RESPIRATION RATE: 17 BRPM | OXYGEN SATURATION: 99 %

## 2025-07-01 DIAGNOSIS — Z98.890 OTHER SPECIFIED POSTPROCEDURAL STATES: Chronic | ICD-10-CM

## 2025-07-01 DIAGNOSIS — Z90.5 ACQUIRED ABSENCE OF KIDNEY: Chronic | ICD-10-CM

## 2025-07-01 DIAGNOSIS — R68.89 OTHER GENERAL SYMPTOMS AND SIGNS: ICD-10-CM

## 2025-07-01 DIAGNOSIS — Z90.3 ACQUIRED ABSENCE OF STOMACH [PART OF]: Chronic | ICD-10-CM

## 2025-07-01 DIAGNOSIS — Z90.49 ACQUIRED ABSENCE OF OTHER SPECIFIED PARTS OF DIGESTIVE TRACT: Chronic | ICD-10-CM

## 2025-07-01 LAB
ALBUMIN SERPL ELPH-MCNC: 3.8 G/DL — SIGNIFICANT CHANGE UP (ref 3.3–5)
ALP SERPL-CCNC: 85 U/L — SIGNIFICANT CHANGE UP (ref 40–120)
ALT FLD-CCNC: 25 U/L — SIGNIFICANT CHANGE UP (ref 10–45)
ANION GAP SERPL CALC-SCNC: 8 MMOL/L — SIGNIFICANT CHANGE UP (ref 5–17)
APPEARANCE UR: CLEAR — SIGNIFICANT CHANGE UP
APTT BLD: 33.9 SEC — SIGNIFICANT CHANGE UP (ref 26.1–36.8)
AST SERPL-CCNC: 24 U/L — SIGNIFICANT CHANGE UP (ref 10–40)
BASOPHILS # BLD AUTO: 0.03 K/UL — SIGNIFICANT CHANGE UP (ref 0–0.2)
BASOPHILS NFR BLD AUTO: 0.5 % — SIGNIFICANT CHANGE UP (ref 0–2)
BILIRUB SERPL-MCNC: 0.7 MG/DL — SIGNIFICANT CHANGE UP (ref 0.2–1.2)
BILIRUB UR-MCNC: NEGATIVE — SIGNIFICANT CHANGE UP
BUN SERPL-MCNC: 20 MG/DL — SIGNIFICANT CHANGE UP (ref 7–23)
CALCIUM SERPL-MCNC: 9.3 MG/DL — SIGNIFICANT CHANGE UP (ref 8.4–10.5)
CHLORIDE SERPL-SCNC: 103 MMOL/L — SIGNIFICANT CHANGE UP (ref 96–108)
CO2 SERPL-SCNC: 28 MMOL/L — SIGNIFICANT CHANGE UP (ref 22–31)
COLOR SPEC: YELLOW — SIGNIFICANT CHANGE UP
CREAT SERPL-MCNC: 0.82 MG/DL — SIGNIFICANT CHANGE UP (ref 0.5–1.3)
DIFF PNL FLD: NEGATIVE — SIGNIFICANT CHANGE UP
EGFR: 88 ML/MIN/1.73M2 — SIGNIFICANT CHANGE UP
EGFR: 88 ML/MIN/1.73M2 — SIGNIFICANT CHANGE UP
EOSINOPHIL # BLD AUTO: 0.17 K/UL — SIGNIFICANT CHANGE UP (ref 0–0.5)
EOSINOPHIL NFR BLD AUTO: 2.9 % — SIGNIFICANT CHANGE UP (ref 0–6)
FLUAV AG NPH QL: SIGNIFICANT CHANGE UP
FLUBV AG NPH QL: SIGNIFICANT CHANGE UP
GLUCOSE SERPL-MCNC: 94 MG/DL — SIGNIFICANT CHANGE UP (ref 70–99)
GLUCOSE UR QL: NEGATIVE MG/DL — SIGNIFICANT CHANGE UP
HCT VFR BLD CALC: 37.2 % — LOW (ref 39–50)
HGB BLD-MCNC: 12.3 G/DL — LOW (ref 13–17)
IMM GRANULOCYTES NFR BLD AUTO: 0.7 % — SIGNIFICANT CHANGE UP (ref 0–0.9)
INR BLD: 0.97 RATIO — SIGNIFICANT CHANGE UP (ref 0.85–1.16)
KETONES UR QL: NEGATIVE MG/DL — SIGNIFICANT CHANGE UP
LACTATE SERPL-SCNC: 0.6 MMOL/L — LOW (ref 0.7–2)
LEUKOCYTE ESTERASE UR-ACNC: NEGATIVE — SIGNIFICANT CHANGE UP
LYMPHOCYTES # BLD AUTO: 1.34 K/UL — SIGNIFICANT CHANGE UP (ref 1–3.3)
LYMPHOCYTES # BLD AUTO: 23.2 % — SIGNIFICANT CHANGE UP (ref 13–44)
MCHC RBC-ENTMCNC: 28.7 PG — SIGNIFICANT CHANGE UP (ref 27–34)
MCHC RBC-ENTMCNC: 33.1 G/DL — SIGNIFICANT CHANGE UP (ref 32–36)
MCV RBC AUTO: 86.9 FL — SIGNIFICANT CHANGE UP (ref 80–100)
MONOCYTES # BLD AUTO: 0.47 K/UL — SIGNIFICANT CHANGE UP (ref 0–0.9)
MONOCYTES NFR BLD AUTO: 8.1 % — SIGNIFICANT CHANGE UP (ref 2–14)
NEUTROPHILS # BLD AUTO: 3.72 K/UL — SIGNIFICANT CHANGE UP (ref 1.8–7.4)
NEUTROPHILS NFR BLD AUTO: 64.6 % — SIGNIFICANT CHANGE UP (ref 43–77)
NITRITE UR-MCNC: NEGATIVE — SIGNIFICANT CHANGE UP
NRBC BLD AUTO-RTO: 0 /100 WBCS — SIGNIFICANT CHANGE UP (ref 0–0)
PH UR: 5.5 — SIGNIFICANT CHANGE UP (ref 5–8)
PLATELET # BLD AUTO: 233 K/UL — SIGNIFICANT CHANGE UP (ref 150–400)
POTASSIUM SERPL-MCNC: 4.2 MMOL/L — SIGNIFICANT CHANGE UP (ref 3.5–5.3)
POTASSIUM SERPL-SCNC: 4.2 MMOL/L — SIGNIFICANT CHANGE UP (ref 3.5–5.3)
PROCALCITONIN SERPL-MCNC: 0.04 NG/ML — SIGNIFICANT CHANGE UP
PROT SERPL-MCNC: 7.6 G/DL — SIGNIFICANT CHANGE UP (ref 6–8.3)
PROT UR-MCNC: NEGATIVE MG/DL — SIGNIFICANT CHANGE UP
PROTHROM AB SERPL-ACNC: 11.4 SEC — SIGNIFICANT CHANGE UP (ref 9.9–13.4)
RBC # BLD: 4.28 M/UL — SIGNIFICANT CHANGE UP (ref 4.2–5.8)
RBC # FLD: 15.1 % — HIGH (ref 10.3–14.5)
RSV RNA NPH QL NAA+NON-PROBE: SIGNIFICANT CHANGE UP
SARS-COV-2 RNA SPEC QL NAA+PROBE: SIGNIFICANT CHANGE UP
SODIUM SERPL-SCNC: 139 MMOL/L — SIGNIFICANT CHANGE UP (ref 135–145)
SOURCE RESPIRATORY: SIGNIFICANT CHANGE UP
SP GR SPEC: 1 — SIGNIFICANT CHANGE UP (ref 1–1.03)
TROPONIN I, HIGH SENSITIVITY RESULT: 5.3 NG/L — SIGNIFICANT CHANGE UP
UROBILINOGEN FLD QL: 0.2 MG/DL — SIGNIFICANT CHANGE UP (ref 0.2–1)
WBC # BLD: 5.77 K/UL — SIGNIFICANT CHANGE UP (ref 3.8–10.5)
WBC # FLD AUTO: 5.77 K/UL — SIGNIFICANT CHANGE UP (ref 3.8–10.5)

## 2025-07-01 PROCEDURE — 85610 PROTHROMBIN TIME: CPT

## 2025-07-01 PROCEDURE — 85730 THROMBOPLASTIN TIME PARTIAL: CPT

## 2025-07-01 PROCEDURE — 99285 EMERGENCY DEPT VISIT HI MDM: CPT

## 2025-07-01 PROCEDURE — 71045 X-RAY EXAM CHEST 1 VIEW: CPT | Mod: 26

## 2025-07-01 PROCEDURE — 80053 COMPREHEN METABOLIC PANEL: CPT

## 2025-07-01 PROCEDURE — 0241U: CPT

## 2025-07-01 PROCEDURE — 93010 ELECTROCARDIOGRAM REPORT: CPT

## 2025-07-01 PROCEDURE — 99223 1ST HOSP IP/OBS HIGH 75: CPT | Mod: GC

## 2025-07-01 PROCEDURE — 84484 ASSAY OF TROPONIN QUANT: CPT

## 2025-07-01 PROCEDURE — 85025 COMPLETE CBC W/AUTO DIFF WBC: CPT

## 2025-07-01 PROCEDURE — 84145 PROCALCITONIN (PCT): CPT

## 2025-07-01 PROCEDURE — 71045 X-RAY EXAM CHEST 1 VIEW: CPT

## 2025-07-01 PROCEDURE — 81003 URINALYSIS AUTO W/O SCOPE: CPT

## 2025-07-01 PROCEDURE — 83605 ASSAY OF LACTIC ACID: CPT

## 2025-07-01 PROCEDURE — 36415 COLL VENOUS BLD VENIPUNCTURE: CPT

## 2025-07-01 RX ORDER — PIPERACILLIN-TAZO-DEXTROSE,ISO 3.375G/5
3.38 IV SOLUTION, PIGGYBACK PREMIX FROZEN(ML) INTRAVENOUS EVERY 8 HOURS
Refills: 0 | Status: DISCONTINUED | OUTPATIENT
Start: 2025-07-02 | End: 2025-07-04

## 2025-07-01 RX ORDER — ASPIRIN 325 MG
81 TABLET ORAL DAILY
Refills: 0 | Status: DISCONTINUED | OUTPATIENT
Start: 2025-07-01 | End: 2025-07-04

## 2025-07-01 RX ORDER — AMLODIPINE BESYLATE 10 MG/1
10 TABLET ORAL DAILY
Refills: 0 | Status: DISCONTINUED | OUTPATIENT
Start: 2025-07-01 | End: 2025-07-04

## 2025-07-01 RX ORDER — PIPERACILLIN-TAZO-DEXTROSE,ISO 3.375G/5
3.38 IV SOLUTION, PIGGYBACK PREMIX FROZEN(ML) INTRAVENOUS ONCE
Refills: 0 | Status: COMPLETED | OUTPATIENT
Start: 2025-07-02 | End: 2025-07-02

## 2025-07-01 RX ORDER — CYANOCOBALAMIN 1000 UG/ML
1 INJECTION INTRAMUSCULAR; SUBCUTANEOUS
Refills: 0 | DISCHARGE

## 2025-07-01 RX ORDER — ENOXAPARIN SODIUM 100 MG/ML
40 INJECTION SUBCUTANEOUS EVERY 24 HOURS
Refills: 0 | Status: DISCONTINUED | OUTPATIENT
Start: 2025-07-01 | End: 2025-07-04

## 2025-07-01 RX ORDER — MELATONIN 5 MG
3 TABLET ORAL AT BEDTIME
Refills: 0 | Status: DISCONTINUED | OUTPATIENT
Start: 2025-07-01 | End: 2025-07-04

## 2025-07-01 RX ORDER — ACETAMINOPHEN 500 MG/5ML
650 LIQUID (ML) ORAL EVERY 6 HOURS
Refills: 0 | Status: DISCONTINUED | OUTPATIENT
Start: 2025-07-01 | End: 2025-07-04

## 2025-07-01 RX ORDER — MAGNESIUM, ALUMINUM HYDROXIDE 200-200 MG
30 TABLET,CHEWABLE ORAL EVERY 4 HOURS
Refills: 0 | Status: DISCONTINUED | OUTPATIENT
Start: 2025-07-01 | End: 2025-07-04

## 2025-07-01 RX ORDER — EZETIMIBE 10 MG/1
10 TABLET ORAL DAILY
Refills: 0 | Status: DISCONTINUED | OUTPATIENT
Start: 2025-07-01 | End: 2025-07-04

## 2025-07-01 RX ORDER — VANCOMYCIN HCL IN 5 % DEXTROSE 1.5G/250ML
1000 PLASTIC BAG, INJECTION (ML) INTRAVENOUS ONCE
Refills: 0 | Status: COMPLETED | OUTPATIENT
Start: 2025-07-01 | End: 2025-07-01

## 2025-07-01 RX ORDER — VANCOMYCIN HCL IN 5 % DEXTROSE 1.5G/250ML
1000 PLASTIC BAG, INJECTION (ML) INTRAVENOUS EVERY 12 HOURS
Refills: 0 | Status: DISCONTINUED | OUTPATIENT
Start: 2025-07-01 | End: 2025-07-04

## 2025-07-01 RX ORDER — PIPERACILLIN-TAZO-DEXTROSE,ISO 3.375G/5
3.38 IV SOLUTION, PIGGYBACK PREMIX FROZEN(ML) INTRAVENOUS ONCE
Refills: 0 | Status: COMPLETED | OUTPATIENT
Start: 2025-07-01 | End: 2025-07-01

## 2025-07-01 RX ORDER — ONDANSETRON HCL/PF 4 MG/2 ML
4 VIAL (ML) INJECTION EVERY 8 HOURS
Refills: 0 | Status: DISCONTINUED | OUTPATIENT
Start: 2025-07-01 | End: 2025-07-04

## 2025-07-01 RX ORDER — CEFEPIME 2 G/20ML
1000 INJECTION, POWDER, FOR SOLUTION INTRAVENOUS ONCE
Refills: 0 | Status: COMPLETED | OUTPATIENT
Start: 2025-07-01 | End: 2025-07-01

## 2025-07-01 RX ADMIN — CEFEPIME 100 MILLIGRAM(S): 2 INJECTION, POWDER, FOR SOLUTION INTRAVENOUS at 16:38

## 2025-07-01 RX ADMIN — Medication 250 MILLIGRAM(S): at 16:53

## 2025-07-01 RX ADMIN — Medication 200 GRAM(S): at 22:36

## 2025-07-01 NOTE — H&P ADULT - ASSESSMENT
one liner from hospitals.....    Admitted for :    #Chronic Abdominal Wound, Increase Drainage   #Weakness #AMS  - Chronic wound since 2019, increased drainage for past 2 days w/ increased generalized weakness and GAMALIEL   - Not meeting SIRs criteria on admission, lactate 0.6  - CXR w/o acute infectious etiology, UA neg.   - Blood cultures x 2 drawn in the ED, will add urine culture   - S/p cefepime and vanc in ED. C/w zosyn and vanc   - F/U CTAP to r/o other intrabominal etiology, F/U CTH for completeness in pt w/ increased weakness/feeling altered/confused today   - ID consult     #HTN  #HLD  - Chronic   - C/w home meds: amlodipine 10mg, zetia 10mg, aspirin 81mg   - Monitor vitals     #DVT ppx  - Lovenox    #GOC  - Full code    Discussed with attending, Dr Livingston        Patient is a 78 yo male with a pmhx of HTN, RCC s/p partial nephrectomy (2014), gastric cancer s/p gastrectomy (2014), bowel perforation (2019), TIA (2/2024), SVT s/p ablation, s/p pacemaker for sinus pauses presenting to [facility] for increased drainage from abdominal wound and increased generalized weakness.       Admitted for :    #Chronic Abdominal Wound, Increase Drainage   #Weakness #AMS  - Chronic wound since 2019, increased drainage for past 2 days w/ increased generalized weakness and GAMALIEL   - Not meeting SIRs criteria on admission, lactate 0.6  - CXR w/o acute infectious etiology, UA neg.   - Blood cultures x 2 drawn in the ED, will add urine culture   - S/p cefepime and vanc in ED. C/w zosyn and vanc   - F/U CTAP to r/o other intrabominal etiology, F/U CTH for completeness in pt w/ increased weakness/feeling altered/confused today   - ID consult     #HTN  #HLD  - Chronic   - C/w home meds: amlodipine 10mg, zetia 10mg, aspirin 81mg   - Monitor vitals     #DVT ppx  - Lovenox    #GOC  - Full code    Discussed with attending, Dr Livingston        Patient is a 80 yo male with a pmhx of HTN, MRSA, RCC s/p partial nephrectomy (2014), gastric cancer s/p gastrectomy (2014), bowel perforation (2019), chronic abdominal wound s/p perforation repair (2019), TIA (2/2024), SVT s/p ablation, s/p pacemaker for sinus pauses (2024) presenting to [facility] for increased drainage from abdominal wound and increased generalized weakness. Admitted for :    #Chronic Abdominal Wound, Increased Drainage   #Weakness #AMS  - Chronic wound since 2019, increased drainage for past 2 days w/ increased generalized weakness and GAMALIEL   - Not meeting SIRs criteria on admission, lactate 0.6  - CXR w/o acute infectious etiology, UA neg.   - Blood cultures x 2 drawn in the ED, will add urine culture   - S/p cefepime and vanc in ED. C/w zosyn and vanc   - F/U CTAP to r/o other intrabominal etiology, F/U CTH for completeness in pt w/ increased weakness/feeling altered/confused today   - ID consult     #HTN  #HLD  - Chronic   - C/w home meds: amlodipine 10mg, zetia 10mg, aspirin 81mg   - Monitor vitals     #DVT ppx  - Lovenox    #GOC  - Full code    Discussed with attending, Dr Livingston

## 2025-07-01 NOTE — H&P ADULT - NSHPPHYSICALEXAM_GEN_ALL_CORE
GENERAL: NAD, lying in bed comfortably  HEAD:  Atraumatic, normocephalic  EYES: EOMI, conjunctiva and sclera clear  NECK: Supple, trachea midline, no JVD  HEART: Regular rate and rhythm, no murmurs, rubs, or gallops  LUNGS: Unlabored respirations.  Clear to auscultation bilaterally, no crackles, wheezing, or rhonchi  ABDOMEN: Soft, nontender, nondistended, +BS  EXTREMITIES: 2+ peripheral pulses bilaterally. No clubbing, cyanosis, or edema  NERVOUS SYSTEM:  A&Ox3, moving all extremities, no focal deficits   SKIN: No rashes or lesions GENERAL: NAD, lying in bed comfortably  HEAD:  Atraumatic, normocephalic, mild tenderness bilateral maxillary sinus  EYES: EOMI, conjunctiva and sclera clear, PERRLA  NECK: Supple, trachea midline, no JVD  HEART: Regular rate and rhythm, no murmurs, rubs, or gallops  LUNGS: Unlabored respirations.  Clear to auscultation bilaterally, no crackles, wheezing, or rhonchi  ABDOMEN: Soft, TTP hypogastric, no rebound/guarding, nondistended, +BS, + chronic abdominal wound draining white/yellow pus  EXTREMITIES: 2+ peripheral pulses bilaterally. No clubbing, cyanosis, or edema  NERVOUS SYSTEM:  A&Ox3, moving all extremities, no focal deficits, + numbness bilateral LE from knee distal, light touch intact below knee, patellar reflex intact bilaterally  SKIN: No rashes or lesions

## 2025-07-01 NOTE — ED PROVIDER NOTE - CLINICAL SUMMARY MEDICAL DECISION MAKING FREE TEXT BOX
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  Endorsing urinary frequency but no dysuria.  No nausea no vomiting diarrhea no chest pain no cough no sore throat no headache no shortness of breath.  No falls no trauma.    Vitals are normal afebrile.    Constitutional: NAD AAOx3  Eyes: PERRLA EOMI  Head: Normocephalic atraumatic  Mouth: MMM  Cardiac: regular rate   Resp: No respiratory distress.   GI: Abd s/nt/nd  Neuro: CN2-12 intact, strength is 5/5 in all extremities.   Skin: No visible rashes      No white count.  Labs normal.  Limited viral swab is negative.  Chest x-ray is clear.  Urine is clear.  Blood culture pending.  Urine culture pending.  Vanco and cefepime ordered and given.  Admit to medicine for follow-up cultures.

## 2025-07-01 NOTE — H&P ADULT - HISTORY OF PRESENT ILLNESS
Patient is a 80 yo male with a pmhx of ... presenting to the hospital for increased drainage from abdominal wound and increased weakness. HPI.....    ED course:   vitals:  labs:  EKG:  Imaging: CXR wet read, no consolidations, no congestive features, no ptx  s/p: vanc and cefepime, blood cultures x2 drawn    Vasile BURNS personally reviewed Patient is a 80 yo male with a pmhx of HTN, MRSA, RCC s/p partial nephrectomy (2014), gastric cancer s/p gastrectomy (2014), bowel perforation (2019), chronic abdominal wound s/p perforation repair (2019), TIA (2/2024), SVT s/p ablation, s/p pacemaker for sinus pauses (2024) presenting to ED for increased drainage from abdominal wound and increased generalized weakness.     Patient reports 2 days new onset low energy, mild generalized headache, increased bilateral LE numbness, and 1 day new onset hypogastric pain with urinary urgency and increased drainage from abdominal wound. Went to urgent care on 7/1/25 in the morning, UA wnl at urgent care so sent home. Increasing disorientation so came to [facility] on 7/1/25.      Patient at baseline plays tennis 3x week and takes nap 1x daily but now needs to take a nap 3x a day and feels generalized weakness. States at baseline has numbness in distal bilateral lower extremities and now has numbness extending to below knees bilaterally.     Recently traveled to [place] from Nov. 2024-May 2025, had chills and altered mental status in end of May while in [place] but no abdominal pain, went to ED there and d/c from ED with cipro for a week which resolved symptoms.     Denies shaking/chills, fever, vision changes, runny nose, myalgias, and hematuria.     ED course:   vitals: T 97.8, HR 67, /88, RR 17, SpO2 99  labs: Hb 12.3, Hct 37.2, RDW 15.1, Lactate 0.6, UA wnl, Troponin wnl  EKG: sinus teresa rate 56 no acute ST changes  Imaging: CXR wet read, no consolidations, no congestive features, no ptx  s/p: vanc and cefepime, blood cultures x2 drawn    Vasile BURNS personally reviewed Patient is a 78 yo male with a pmhx of HTN, MRSA, RCC s/p partial nephrectomy (2014), gastric cancer s/p gastrectomy (2014), bowel perforation (2019), chronic abdominal wound s/p perforation repair (2019), TIA (2/2024), SVT s/p ablation, s/p pacemaker for sinus pauses (2024) presenting to [facility] for increased drainage from abdominal wound and increased generalized weakness.     Patient reports 2 days new onset low energy, mild generalized headache, increased bilateral LE numbness, and 1 day new onset hypogastric pain with urinary urgency and increased drainage from abdominal wound. Went to urgent care on 7/1/25 in the morning, UA wnl at urgent care so sent home. Increasing disorientation so came to [facility]. Patient at baseline plays tennis 3x week and takes nap 1x daily but now needs to take a nap 3x a day and feels generalized weakness. States at baseline has numbness in distal bilateral lower extremities and now has numbness extending to below knees bilaterally. Recently traveled to [place] from Nov. 2024-May 2025, had chills and altered mental status in end of May while in [place] but no abdominal pain, went to ED there and d/c from ED with cipro for a week which resolved symptoms. Denies shaking/chills, fever, vision changes, runny nose, myalgias, and hematuria.     ED course:   vitals: T 97.8, HR 67, /88, RR 17, SpO2 99  labs: Hb 12.3, Hct 37.2, RDW 15.1, Lactate 0.6, UA wnl, Troponin wnl  EKG: sinus teresa rate 56 no acute ST changes  Imaging: CXR wet read, no consolidations, no congestive features, no ptx  s/p: vanc and cefepime, blood cultures x2 drawn    Vasile BURNS personally reviewed

## 2025-07-01 NOTE — H&P ADULT - NSHPREVIEWOFSYSTEMS_GEN_ALL_CORE
REVIEW OF SYSTEMS:    CONSTITUTIONAL:  No weakness, fevers or chills  EYES/ENT:  No visual changes;  No vertigo or throat pain   NECK:  No pain or stiffness  RESPIRATORY:  No cough, wheezing, hemoptysis; No shortness of breath  CARDIOVASCULAR:  No chest pain or palpitations  GASTROINTESTINAL:  No abdominal or epigastric pain. No nausea, vomiting, or hematemesis; No diarrhea or constipation. No melena or hematochezia.  GENITOURINARY:  No dysuria, frequency or hematuria  NEUROLOGICAL:  No numbness or weakness  SKIN:  No itching, rashes REVIEW OF SYSTEMS:    CONSTITUTIONAL:  + weakness, no fevers or chills  EYES/ENT:  No visual changes;  No vertigo or throat pain   NECK:  No pain or stiffness  RESPIRATORY:  No cough, wheezing, hemoptysis; No shortness of breath  CARDIOVASCULAR:  No chest pain or palpitations  GASTROINTESTINAL:  +abdominal pain. No nausea, vomiting, or hematemesis; No diarrhea or constipation. No melena or hematochezia.  GENITOURINARY:  No dysuria, + frequency, no hematuria  NEUROLOGICAL:  + bilateral LE numbness, + weakness  SKIN:  No itching, rashes

## 2025-07-01 NOTE — PATIENT PROFILE ADULT - FALL HARM RISK - HARM RISK INTERVENTIONS

## 2025-07-01 NOTE — ED ADULT NURSE NOTE - NSFALLUNIVINTERV_ED_ALL_ED
Bed/Stretcher in lowest position, wheels locked, appropriate side rails in place/Call bell, personal items and telephone in reach/Instruct patient to call for assistance before getting out of bed/chair/stretcher/Non-slip footwear applied when patient is off stretcher/[place] to call system/Physically safe environment - no spills, clutter or unnecessary equipment/Purposeful proactive rounding/Room/bathroom lighting operational, light cord in reach

## 2025-07-01 NOTE — ED ADULT NURSE NOTE - OBJECTIVE STATEMENT
Patient presents to ED with complaint of lethargy, weakness and dizziness x 2 days. 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

## 2025-07-01 NOTE — H&P ADULT - ATTENDING COMMENTS
I have personally seen and examined patient on the above date.  I discussed the case with Dr Berg and I agree with findings and plan as detailed per note above, which I have amended where appropriate.    Superseding the above.   79M hx of HTN, HLD,  RCC s/p partial nephrectomy (2014), gastric cancer s/p gastrectomy (2014), bowel perforation (2019), TIA (2/2024), SVT s/p ablation, s/p PPM 10/2024 pw significant general malaise for the past 2-3 days especially worse today with added new sxs of feeling disoriented and 'not as quick' mentally. Noted some increase drainage from chronic abd wound past several days and some lower abd bladder discomfort and urinary frequency for the past several days. No subjective fevers, or chills, no URI sxs, no cough, SOB CP, NV. Went to UC earlier today and UA performed that was neg  +abd discomfort as above. +chronic loose stools since bowel sx. +chronic neuropathy in the feet but reported worse than usual today but no ascending neuropathic sxs. He reported some mild balance issues with walking but felt it was 'psychological'  Had episode of chills, and fatigue in 5/2025 while in [place] and was in ED - unclear what workup was done but pt was prescribed cipro and flagyl course with resolution of sxs.   In ED afebrile P: 67 BP: 161/88 sat 99% on RA  PE:  HEENT: MMM  CV:S1S2,RRR, no mgr  CL: CTA bl   Abd: soft mild lower abd tenderness on palp. +small R periumbilical wound with no surrounding erythema with some slightly cloudy drainage. no odor.   Ext; neg CCE.   Neuro; nonfocal. +bl feet neuropathy to mid shin, patella reflex intact on L. s/p R TKR.   Labs:     EKG: personally rev.   CXR: personally rev. NAPD +microPPM    AP:     #generalized malaise r/o occult infection.   cont Vanco and Zosyn for now. Pt with known chronic MRSA wound infection and hx of Klebsiella bacteremai.   check CTAP to be complete.   otherwise labs are completely benign  #hx of TIA, with negative infectious babin thus far  nonspecific sxs of mild disorientation and neuropathy.  nonfocal exam  check CT head to be complete.   #HTN  cont BP meds as tolerated  #HLD  cont statin  #GOC  Pt is FULL Code    Vasile alfaro  D/W Dr Álvarez.

## 2025-07-01 NOTE — ED ADULT TRIAGE NOTE - CHIEF COMPLAINT QUOTE
PAtient presents to ED with complaint of lethargy, weakness x 2 days. Also dizziness. No nausea, vomiting, shortness of breath, denies pain on urination.

## 2025-07-02 LAB
ALBUMIN SERPL ELPH-MCNC: 3.4 G/DL — SIGNIFICANT CHANGE UP (ref 3.3–5)
ALP SERPL-CCNC: 79 U/L — SIGNIFICANT CHANGE UP (ref 40–120)
ALT FLD-CCNC: 21 U/L — SIGNIFICANT CHANGE UP (ref 10–45)
ANION GAP SERPL CALC-SCNC: 9 MMOL/L — SIGNIFICANT CHANGE UP (ref 5–17)
AST SERPL-CCNC: 21 U/L — SIGNIFICANT CHANGE UP (ref 10–40)
BASOPHILS # BLD AUTO: 0.03 K/UL — SIGNIFICANT CHANGE UP (ref 0–0.2)
BASOPHILS NFR BLD AUTO: 0.8 % — SIGNIFICANT CHANGE UP (ref 0–2)
BILIRUB SERPL-MCNC: 1.1 MG/DL — SIGNIFICANT CHANGE UP (ref 0.2–1.2)
BUN SERPL-MCNC: 15 MG/DL — SIGNIFICANT CHANGE UP (ref 7–23)
CALCIUM SERPL-MCNC: 8.9 MG/DL — SIGNIFICANT CHANGE UP (ref 8.4–10.5)
CHLORIDE SERPL-SCNC: 103 MMOL/L — SIGNIFICANT CHANGE UP (ref 96–108)
CO2 SERPL-SCNC: 26 MMOL/L — SIGNIFICANT CHANGE UP (ref 22–31)
CREAT SERPL-MCNC: 0.87 MG/DL — SIGNIFICANT CHANGE UP (ref 0.5–1.3)
EGFR: 87 ML/MIN/1.73M2 — SIGNIFICANT CHANGE UP
EGFR: 87 ML/MIN/1.73M2 — SIGNIFICANT CHANGE UP
EOSINOPHIL # BLD AUTO: 0.2 K/UL — SIGNIFICANT CHANGE UP (ref 0–0.5)
EOSINOPHIL NFR BLD AUTO: 5.2 % — SIGNIFICANT CHANGE UP (ref 0–6)
GLUCOSE SERPL-MCNC: 101 MG/DL — HIGH (ref 70–99)
HCT VFR BLD CALC: 36.5 % — LOW (ref 39–50)
HGB BLD-MCNC: 11.9 G/DL — LOW (ref 13–17)
IMM GRANULOCYTES NFR BLD AUTO: 0.3 % — SIGNIFICANT CHANGE UP (ref 0–0.9)
LYMPHOCYTES # BLD AUTO: 1.02 K/UL — SIGNIFICANT CHANGE UP (ref 1–3.3)
LYMPHOCYTES # BLD AUTO: 26.4 % — SIGNIFICANT CHANGE UP (ref 13–44)
MCHC RBC-ENTMCNC: 28.1 PG — SIGNIFICANT CHANGE UP (ref 27–34)
MCHC RBC-ENTMCNC: 32.6 G/DL — SIGNIFICANT CHANGE UP (ref 32–36)
MCV RBC AUTO: 86.3 FL — SIGNIFICANT CHANGE UP (ref 80–100)
MONOCYTES # BLD AUTO: 0.42 K/UL — SIGNIFICANT CHANGE UP (ref 0–0.9)
MONOCYTES NFR BLD AUTO: 10.9 % — SIGNIFICANT CHANGE UP (ref 2–14)
NEUTROPHILS # BLD AUTO: 2.19 K/UL — SIGNIFICANT CHANGE UP (ref 1.8–7.4)
NEUTROPHILS NFR BLD AUTO: 56.4 % — SIGNIFICANT CHANGE UP (ref 43–77)
NRBC BLD AUTO-RTO: 0 /100 WBCS — SIGNIFICANT CHANGE UP (ref 0–0)
PLATELET # BLD AUTO: 218 K/UL — SIGNIFICANT CHANGE UP (ref 150–400)
POTASSIUM SERPL-MCNC: 4.4 MMOL/L — SIGNIFICANT CHANGE UP (ref 3.5–5.3)
POTASSIUM SERPL-SCNC: 4.4 MMOL/L — SIGNIFICANT CHANGE UP (ref 3.5–5.3)
PROT SERPL-MCNC: 7.1 G/DL — SIGNIFICANT CHANGE UP (ref 6–8.3)
RBC # BLD: 4.23 M/UL — SIGNIFICANT CHANGE UP (ref 4.2–5.8)
RBC # FLD: 15.1 % — HIGH (ref 10.3–14.5)
SODIUM SERPL-SCNC: 138 MMOL/L — SIGNIFICANT CHANGE UP (ref 135–145)
WBC # BLD: 3.87 K/UL — SIGNIFICANT CHANGE UP (ref 3.8–10.5)
WBC # FLD AUTO: 3.87 K/UL — SIGNIFICANT CHANGE UP (ref 3.8–10.5)

## 2025-07-02 PROCEDURE — 70450 CT HEAD/BRAIN W/O DYE: CPT

## 2025-07-02 PROCEDURE — 87040 BLOOD CULTURE FOR BACTERIA: CPT

## 2025-07-02 PROCEDURE — 70450 CT HEAD/BRAIN W/O DYE: CPT | Mod: 26

## 2025-07-02 PROCEDURE — 36415 COLL VENOUS BLD VENIPUNCTURE: CPT

## 2025-07-02 PROCEDURE — 85730 THROMBOPLASTIN TIME PARTIAL: CPT

## 2025-07-02 PROCEDURE — 99233 SBSQ HOSP IP/OBS HIGH 50: CPT

## 2025-07-02 PROCEDURE — 83605 ASSAY OF LACTIC ACID: CPT

## 2025-07-02 PROCEDURE — 93005 ELECTROCARDIOGRAM TRACING: CPT

## 2025-07-02 PROCEDURE — 87086 URINE CULTURE/COLONY COUNT: CPT

## 2025-07-02 PROCEDURE — 84145 PROCALCITONIN (PCT): CPT

## 2025-07-02 PROCEDURE — 74177 CT ABD & PELVIS W/CONTRAST: CPT | Mod: 26

## 2025-07-02 PROCEDURE — 84484 ASSAY OF TROPONIN QUANT: CPT

## 2025-07-02 PROCEDURE — 0241U: CPT

## 2025-07-02 PROCEDURE — 74177 CT ABD & PELVIS W/CONTRAST: CPT

## 2025-07-02 PROCEDURE — 81003 URINALYSIS AUTO W/O SCOPE: CPT

## 2025-07-02 PROCEDURE — 85610 PROTHROMBIN TIME: CPT

## 2025-07-02 PROCEDURE — 99222 1ST HOSP IP/OBS MODERATE 55: CPT

## 2025-07-02 PROCEDURE — 80053 COMPREHEN METABOLIC PANEL: CPT

## 2025-07-02 PROCEDURE — 71045 X-RAY EXAM CHEST 1 VIEW: CPT

## 2025-07-02 PROCEDURE — 85025 COMPLETE CBC W/AUTO DIFF WBC: CPT

## 2025-07-02 RX ORDER — LACTOBACILLUS ACIDOPHILUS/PECT 75 MM-100
1 CAPSULE ORAL DAILY
Refills: 0 | Status: DISCONTINUED | OUTPATIENT
Start: 2025-07-02 | End: 2025-07-04

## 2025-07-02 RX ADMIN — Medication 81 MILLIGRAM(S): at 12:15

## 2025-07-02 RX ADMIN — Medication 25 GRAM(S): at 21:40

## 2025-07-02 RX ADMIN — ENOXAPARIN SODIUM 40 MILLIGRAM(S): 100 INJECTION SUBCUTANEOUS at 06:32

## 2025-07-02 RX ADMIN — Medication 250 MILLIGRAM(S): at 17:00

## 2025-07-02 RX ADMIN — Medication 650 MILLIGRAM(S): at 13:15

## 2025-07-02 RX ADMIN — Medication 650 MILLIGRAM(S): at 12:14

## 2025-07-02 RX ADMIN — Medication 2000 UNIT(S): at 12:15

## 2025-07-02 RX ADMIN — Medication 25 GRAM(S): at 03:48

## 2025-07-02 RX ADMIN — AMLODIPINE BESYLATE 10 MILLIGRAM(S): 10 TABLET ORAL at 06:31

## 2025-07-02 RX ADMIN — EZETIMIBE 10 MILLIGRAM(S): 10 TABLET ORAL at 12:15

## 2025-07-02 RX ADMIN — Medication 3 MILLIGRAM(S): at 02:12

## 2025-07-02 RX ADMIN — Medication 250 MILLIGRAM(S): at 04:29

## 2025-07-02 RX ADMIN — Medication 650 MILLIGRAM(S): at 03:34

## 2025-07-02 RX ADMIN — Medication 650 MILLIGRAM(S): at 21:39

## 2025-07-02 RX ADMIN — Medication 650 MILLIGRAM(S): at 02:12

## 2025-07-02 RX ADMIN — Medication 25 GRAM(S): at 12:16

## 2025-07-02 NOTE — CONSULT NOTE ADULT - ASSESSMENT
a/p    Non peritoneal abdominal exam. Non septic patient. Chronic MRSA with "retained" mesh.    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  Evaluated and treated at [facility] by Chair Dr. Gamal Venegas who advised against future suture due to high risk of bowel injury. CT imaging corroborates loops of intestine adherent to undersurface of abdominal wall/    Surgery is essentially contraindicated in the present presentation.    I recommend:    ID consult  Wound care consult(I spoke to Dr. Mendez who will see patient).  Nutrition consult  May feed patient      Thank you      Dr. Ronald Garza  cell#[phone]

## 2025-07-02 NOTE — CONSULT NOTE ADULT - SUBJECTIVE AND OBJECTIVE BOX
CC:  Patient is a 79y old  Male who presents with a chief complaint of Increased drainage from abdominal wound (02 Jul 2025 13:09)      HPI:  Patient is a 80 yo male with a pmhx of HTN, MRSA, RCC s/p partial nephrectomy (2014), gastric cancer s/p gastrectomy (2014), bowel perforation (2019), chronic abdominal wound s/p perforation repair (2019), TIA (2/2024), SVT s/p ablation, s/p pacemaker for sinus pauses (2024) presenting to [facility] for increased drainage from abdominal wound and increased generalized weakness.     Patient reports 2 days new onset low energy, mild generalized headache, increased bilateral LE numbness, and 1 day new onset hypogastric pain with urinary urgency and increased drainage from abdominal wound. Went to urgent care on 7/1/25 in the morning, UA wnl at urgent care so sent home. Increasing disorientation so came to [facility]. Patient at baseline plays tennis 3x week and takes nap 1x daily but now needs to take a nap 3x a day and feels generalized weakness. States at baseline has numbness in distal bilateral lower extremities and now has numbness extending to below knees bilaterally. Recently traveled to [place] from Nov. 2024-May 2025, had chills and altered mental status in end of May while in [place] but no abdominal pain, went to ED there and d/c from ED with cipro for a week which resolved symptoms. Denies shaking/chills, fever, vision changes, runny nose, myalgias, and hematuria.     ED course:   vitals: T 97.8, HR 67, /88, RR 17, SpO2 99  labs: Hb 12.3, Hct 37.2, RDW 15.1, Lactate 0.6, UA wnl, Troponin wnl  EKG: sinus teresa rate 56 no acute ST changes  Imaging: CXR wet read, no consolidations, no congestive features, no ptx  s/p: vanc and cefepime, blood cultures x2 drawn    Vasile BURNS personally reviewed (01 Jul 2025 19:43)      PAST MEDICAL & SURGICAL HISTORY:  HTN (hypertension)      Renal cell carcinoma, unspecified laterality  right      Pancreatitis  > 5 yrs ago      SVT (supraventricular tachycardia)  had ablation in 2018      Gastric cancer  2014      S/P gastrectomy  2014      S/P cholecystectomy  2018      H/O partial nephrectomy  2014 - right      H/O umbilical hernia repair  2015, bilat inguina hernia 15 yrs ago      S/P ACL repair  1997      H/O shoulder surgery  right yr 2000, left 2006      History of repair of hiatal hernia  2018      H/O knee surgery  right 2009 - meniscus          Allergies    sulfa drugs (Unknown)    Intolerances        SOCIAL HISTORY          Smoking: Yes [ ]  No [ ]   ______pk yrs          ETOH  Yes [ ]  No [ ]  Social [ ]          DRUGS:  Yes [ ]  No [ ]  if so what______________    FAMILY HISTORY:  FH: type 2 diabetes (Father)    FH: breast cancer (Mother)        MEDICATIONS  (STANDING):  amLODIPine   Tablet 10 milliGRAM(s) Oral daily  aspirin enteric coated 81 milliGRAM(s) Oral daily  cholecalciferol 2000 Unit(s) Oral daily  enoxaparin Injectable 40 milliGRAM(s) SubCutaneous every 24 hours  ezetimibe 10 milliGRAM(s) Oral daily  piperacillin/tazobactam IVPB.. 3.375 Gram(s) IV Intermittent every 8 hours  vancomycin  IVPB 1000 milliGRAM(s) IV Intermittent every 12 hours    MEDICATIONS  (PRN):  acetaminophen     Tablet .. 650 milliGRAM(s) Oral every 6 hours PRN Temp greater or equal to 38C (100.4F), Mild Pain (1 - 3)  aluminum hydroxide/magnesium hydroxide/simethicone Suspension 30 milliLiter(s) Oral every 4 hours PRN Dyspepsia  melatonin 3 milliGRAM(s) Oral at bedtime PRN Insomnia  ondansetron Injectable 4 milliGRAM(s) IV Push every 8 hours PRN Nausea and/or Vomiting         Review of systems:  General:  no fever or chills  Pulmonary:  no SOB  Cardiac:  denies chest pain, palpitations  GI:  no nausea, vomitting, or abddominal pain  :  no increase frequency, or burning  Heme:  no easy bruising with minor trauma  Musculoskeletal:  No history of back pain or trauma  Skin:  no history of rashes, injury, trauma  Neuro:  no weakness         Vital Signs Last 24 Hrs  T(C): 36.7 (02 Jul 2025 12:00), Max: 37.1 (01 Jul 2025 17:57)  T(F): 98.1 (02 Jul 2025 12:00), Max: 98.7 (01 Jul 2025 17:57)  HR: 60 (02 Jul 2025 12:00) (55 - 67)  BP: 143/80 (02 Jul 2025 12:00) (127/81 - 153/75)  BP(mean): --  RR: 17 (02 Jul 2025 12:00) (16 - 20)  SpO2: 98% (02 Jul 2025 12:00) (97% - 99%)    Parameters below as of 02 Jul 2025 12:00  Patient On (Oxygen Delivery Method): room air        Physical Exam:    General:  Appears stated age, well-groomed, well-nourished, no distress  Extremities:    Skin:     other:  Musculoskeletal:    Neuro/Psych:  Alert, oriented tp time, place and person       LABS:                        11.9   3.87  )-----------( 218      ( 02 Jul 2025 08:00 )             36.5     07-02    138  |  103  |  15  ----------------------------<  101[H]  4.4   |  26  |  0.87    Ca    8.9      02 Jul 2025 08:00    TPro  7.1  /  Alb  3.4  /  TBili  1.1  /  DBili  x   /  AST  21  /  ALT  21  /  AlkPhos  79  07-02    PT/INR - ( 01 Jul 2025 16:30 )   PT: 11.4 sec;   INR: 0.97 ratio         PTT - ( 01 Jul 2025 16:30 )  PTT:33.9 sec  Urinalysis Basic - ( 02 Jul 2025 08:00 )    Color: x / Appearance: x / SG: x / pH: x  Gluc: 101 mg/dL / Ketone: x  / Bili: x / Urobili: x   Blood: x / Protein: x / Nitrite: x   Leuk Esterase: x / RBC: x / WBC x   Sq Epi: x / Non Sq Epi: x / Bacteria: x        RADIOLOGY & ADDITIONAL STUDIES:  ACC: [id number] EXAM:  CT ABDOMEN AND PELVIS IC   ORDERED BY: DIANA GOMEZ     PROCEDURE DATE:  07/02/2025          INTERPRETATION:  CLINICAL INFORMATION: Chronic abdominal wound,   increasing drainage and abdominal pain.    COMPARISON: 9/13/2024CT.    CONTRAST/COMPLICATIONS:  IV Contrast: Omnipaque 350  90 cc administered   10 cc discarded  Oral Contrast: NONE.    PROCEDURE:  CT of the Abdomen and Pelvis was performed.  Sagittal and coronal reformats were performed.    FINDINGS:  LOWER CHEST: Subcentimeter right middle lobe calcified granuloma. Mild   bibasilar atelectasis.    LIVER: Hepatic lobe small cyst unchanged.  BILE DUCTS: Normal caliber.  GALLBLADDER: Cholecystectomy.  SPLEEN: Numerous subcentimeter splenic calcifications appear stable,   likely from prior granulomatous disease.  PANCREAS: Within normal limits.  ADRENALS: Within normal limits.  KIDNEYS/URETERS: Right renal cyst.    BLADDER: Within normal limits.  REPRODUCTIVE ORGANS: Prostate within normal limits.    BOWEL:Status post total gastrectomy. Jejunal anastomosis.  Hiatal hernia   containing colon, unchanged. There is diffuse mural thickening involving   on the ventral bowel loops that abut the abdominal wall adjacent to the   pronounced abdominal wall diastases/abdominal wall defect. No bowel   obstruction. Appendix is not visualized, questioned appendectomy material   on 2, 71.  PERITONEUM/RETROPERITONEUM: Within normal limits.  VESSELS: Atherosclerotic changes.  LYMPH NODES: No lymphadenopathy.  ABDOMINAL WALL: Midline abdominal wall diastases/ abdominal wall defect   unchanged. There is no longer small hernia containing nonobstructing   bowel are old that was previously noted on in the upper midline abdomen   inferior to the level of the xiphoid.Hernia repair material in left   lower abdomen.  BONES: Degenerative changes.    IMPRESSION:  Compared to 9/13/2024 CT, interval resolution of previously noted small   hernia containing nonobstructed bowel that was seen inferior to the   xiphoid level.  There is interval mural thickening of the bowel loops adjacent to the   abdominal wall defect/ area of the diastases, which may be inflammatory.  No fluid collection.  Midline abdominal wall diathesis is otherwise unchanged.    Otherwise, no significant interval change since 9/13/2024 CT.    --- End of Report ---            SARAH LUX MD; Attending Radiologist  This document has been electronically signed. Jul 2 2025  9:55AM    Risks, benefits, and alternatives to treatment discussed. All questions answered with understanding.    Procedure Performed:  (  )Yes     (  )No  Name of Procedure:      [  ]Debridement     [  ]I&D    [  ]Laceration Repair     [  ]Other:  (  )partial thickness     (  )full thickness     (  )subcutaneous     (  )muscle/tendon     (  )bone  (  )sharp     (  )surgical       CC:  Patient is a 79y old  Male who presents with a chief complaint of Increased drainage from abdominal wound (02 Jul 2025 13:09)      HPI:  Patient is a 78 yo male with a pmhx of HTN, MRSA, RCC s/p partial nephrectomy (2014), gastric cancer s/p gastrectomy (2014), bowel perforation (2019), chronic abdominal wound s/p perforation repair (2019), TIA (2/2024), SVT s/p ablation, s/p pacemaker for sinus pauses (2024) presenting to [facility] for increased drainage from abdominal wound and increased generalized weakness.     Patient reports 2 days new onset low energy, mild generalized headache, increased bilateral LE numbness, and 1 day new onset hypogastric pain with urinary urgency and increased drainage from abdominal wound. Went to urgent care on 7/1/25 in the morning, UA wnl at urgent care so sent home. Increasing disorientation so came to [facility]. Patient at baseline plays tennis 3x week and takes nap 1x daily but now needs to take a nap 3x a day and feels generalized weakness. States at baseline has numbness in distal bilateral lower extremities and now has numbness extending to below knees bilaterally. Recently traveled to [place] from Nov. 2024-May 2025, had chills and altered mental status in end of May while in [place] but no abdominal pain, went to ED there and d/c from ED with cipro for a week which resolved symptoms. Denies shaking/chills, fever, vision changes, runny nose, myalgias, and hematuria.     ED course:   vitals: T 97.8, HR 67, /88, RR 17, SpO2 99  labs: Hb 12.3, Hct 37.2, RDW 15.1, Lactate 0.6, UA wnl, Troponin wnl  EKG: sinus teresa rate 56 no acute ST changes  Imaging: CXR wet read, no consolidations, no congestive features, no ptx  s/p: vanc and cefepime, blood cultures x2 drawn    Vasile BURNS personally reviewed (01 Jul 2025 19:43)      PAST MEDICAL & SURGICAL HISTORY:  HTN (hypertension)      Renal cell carcinoma, unspecified laterality  right      Pancreatitis  > 5 yrs ago      SVT (supraventricular tachycardia)  had ablation in 2018      Gastric cancer  2014      S/P gastrectomy  2014      S/P cholecystectomy  2018      H/O partial nephrectomy  2014 - right      H/O umbilical hernia repair  2015, bilat inguina hernia 15 yrs ago      S/P ACL repair  1997      H/O shoulder surgery  right yr 2000, left 2006      History of repair of hiatal hernia  2018      H/O knee surgery  right 2009 - meniscus          Allergies    sulfa drugs (Unknown)    Intolerances        SOCIAL HISTORY          Smoking: Yes [ ]  No [ ]   ______pk yrs          ETOH  Yes [ ]  No [ ]  Social [ ]          DRUGS:  Yes [ ]  No [ ]  if so what______________    FAMILY HISTORY:  FH: type 2 diabetes (Father)    FH: breast cancer (Mother)        MEDICATIONS  (STANDING):  amLODIPine   Tablet 10 milliGRAM(s) Oral daily  aspirin enteric coated 81 milliGRAM(s) Oral daily  cholecalciferol 2000 Unit(s) Oral daily  enoxaparin Injectable 40 milliGRAM(s) SubCutaneous every 24 hours  ezetimibe 10 milliGRAM(s) Oral daily  piperacillin/tazobactam IVPB.. 3.375 Gram(s) IV Intermittent every 8 hours  vancomycin  IVPB 1000 milliGRAM(s) IV Intermittent every 12 hours    MEDICATIONS  (PRN):  acetaminophen     Tablet .. 650 milliGRAM(s) Oral every 6 hours PRN Temp greater or equal to 38C (100.4F), Mild Pain (1 - 3)  aluminum hydroxide/magnesium hydroxide/simethicone Suspension 30 milliLiter(s) Oral every 4 hours PRN Dyspepsia  melatonin 3 milliGRAM(s) Oral at bedtime PRN Insomnia  ondansetron Injectable 4 milliGRAM(s) IV Push every 8 hours PRN Nausea and/or Vomiting         Review of systems:  General:  no fever or chills  Pulmonary:  no SOB  Cardiac:  denies chest pain, palpitations  GI:  no nausea, vomitting, or abddominal pain  :  no increase frequency, or burning  Heme:  no easy bruising with minor trauma  Musculoskeletal:  No history of back pain or trauma  Skin:  no history of rashes, injury, trauma  Neuro:  no weakness         Vital Signs Last 24 Hrs  T(C): 36.7 (02 Jul 2025 12:00), Max: 37.1 (01 Jul 2025 17:57)  T(F): 98.1 (02 Jul 2025 12:00), Max: 98.7 (01 Jul 2025 17:57)  HR: 60 (02 Jul 2025 12:00) (55 - 67)  BP: 143/80 (02 Jul 2025 12:00) (127/81 - 153/75)  BP(mean): --  RR: 17 (02 Jul 2025 12:00) (16 - 20)  SpO2: 98% (02 Jul 2025 12:00) (97% - 99%)    Parameters below as of 02 Jul 2025 12:00  Patient On (Oxygen Delivery Method): room air        Physical Exam:    General:   no distress  Extremities:  no sig edema  Skin:   abdominal wall scarring, non tender, dimpling of skin with green brown small stain on dressing   Neuro/Psych:  Alert, oriented      LABS:                        11.9   3.87  )-----------( 218      ( 02 Jul 2025 08:00 )             36.5     07-02    138  |  103  |  15  ----------------------------<  101[H]  4.4   |  26  |  0.87    Ca    8.9      02 Jul 2025 08:00    TPro  7.1  /  Alb  3.4  /  TBili  1.1  /  DBili  x   /  AST  21  /  ALT  21  /  AlkPhos  79  07-02    PT/INR - ( 01 Jul 2025 16:30 )   PT: 11.4 sec;   INR: 0.97 ratio         PTT - ( 01 Jul 2025 16:30 )  PTT:33.9 sec  Urinalysis Basic - ( 02 Jul 2025 08:00 )    Color: x / Appearance: x / SG: x / pH: x  Gluc: 101 mg/dL / Ketone: x  / Bili: x / Urobili: x   Blood: x / Protein: x / Nitrite: x   Leuk Esterase: x / RBC: x / WBC x   Sq Epi: x / Non Sq Epi: x / Bacteria: x        RADIOLOGY & ADDITIONAL STUDIES:  ACC: [id number] EXAM:  CT ABDOMEN AND PELVIS IC   ORDERED BY: DIANA GOMEZ     PROCEDURE DATE:  07/02/2025          INTERPRETATION:  CLINICAL INFORMATION: Chronic abdominal wound,   increasing drainage and abdominal pain.    COMPARISON: 9/13/2024CT.    CONTRAST/COMPLICATIONS:  IV Contrast: Omnipaque 350  90 cc administered   10 cc discarded  Oral Contrast: NONE.    PROCEDURE:  CT of the Abdomen and Pelvis was performed.  Sagittal and coronal reformats were performed.    FINDINGS:  LOWER CHEST: Subcentimeter right middle lobe calcified granuloma. Mild   bibasilar atelectasis.    LIVER: Hepatic lobe small cyst unchanged.  BILE DUCTS: Normal caliber.  GALLBLADDER: Cholecystectomy.  SPLEEN: Numerous subcentimeter splenic calcifications appear stable,   likely from prior granulomatous disease.  PANCREAS: Within normal limits.  ADRENALS: Within normal limits.  KIDNEYS/URETERS: Right renal cyst.    BLADDER: Within normal limits.  REPRODUCTIVE ORGANS: Prostate within normal limits.    BOWEL:Status post total gastrectomy. Jejunal anastomosis.  Hiatal hernia   containing colon, unchanged. There is diffuse mural thickening involving   on the ventral bowel loops that abut the abdominal wall adjacent to the   pronounced abdominal wall diastases/abdominal wall defect. No bowel   obstruction. Appendix is not visualized, questioned appendectomy material   on 2, 71.  PERITONEUM/RETROPERITONEUM: Within normal limits.  VESSELS: Atherosclerotic changes.  LYMPH NODES: No lymphadenopathy.  ABDOMINAL WALL: Midline abdominal wall diastases/ abdominal wall defect   unchanged. There is no longer small hernia containing nonobstructing   bowel are old that was previously noted on in the upper midline abdomen   inferior to the level of the xiphoid.Hernia repair material in left   lower abdomen.  BONES: Degenerative changes.    IMPRESSION:  Compared to 9/13/2024 CT, interval resolution of previously noted small   hernia containing nonobstructed bowel that was seen inferior to the   xiphoid level.  There is interval mural thickening of the bowel loops adjacent to the   abdominal wall defect/ area of the diastases, which may be inflammatory.  No fluid collection.  Midline abdominal wall diathesis is otherwise unchanged.    Otherwise, no significant interval change since 9/13/2024 CT.    --- End of Report ---            SARAH LUX MD; Attending Radiologist  This document has been electronically signed. Jul 2 2025  9:55AM    Risks, benefits, and alternatives to treatment discussed. All questions answered with understanding.    Procedure Performed:  (  )Yes     (  )No  Name of Procedure:      [  ]Debridement     [  ]I&D    [  ]Laceration Repair     [  ]Other:  (  )partial thickness     (  )full thickness     (  )subcutaneous     (  )muscle/tendon     (  )bone  (  )sharp     (  )surgical

## 2025-07-02 NOTE — CONSULT NOTE ADULT - ASSESSMENT
CTAP 7/2/25:  There is interval mural thickening of the bowel loops adjacent to the   abdominal wall defect/ area of the diastases, which may be inflammatory.  No fluid collection.  Midline abdominal wall diathesis is otherwise unchanged.     Dimpling of skin with spotting of dressing abdominal wall c/w fistula, CT findings noted.  -apply dry sterile dressing daily abdomen   CTAP 7/2/25:  There is interval mural thickening of the bowel loops adjacent to the   abdominal wall defect/ area of the diastases, which may be inflammatory.  No fluid collection.  Midline abdominal wall diathesis is otherwise unchanged.     Dimpling of skin with spotting of dressing abdominal wall c/w fistula, CT findings noted.  -Local wound care:  apply dry sterile dressing daily abdomen

## 2025-07-02 NOTE — CONSULT NOTE ADULT - SUBJECTIVE AND OBJECTIVE BOX
Hospitalist note:      Reason for Admission: Increased drainage from abdominal wound  History of Present Illness:   Patient is a 78 yo male with a pmhx of HTN, MRSA, RCC s/p partial nephrectomy (2014), gastric cancer s/p gastrectomy (2014), bowel perforation (2019), chronic abdominal wound s/p perforation repair (2019), TIA (2/2024), SVT s/p ablation, s/p pacemaker for sinus pauses (2024) presenting to [facility] for increased drainage from abdominal wound and increased generalized weakness.     Patient reports 2 days new onset low energy, mild generalized headache, increased bilateral LE numbness, and 1 day new onset hypogastric pain with urinary urgency and increased drainage from abdominal wound. Went to urgent care on 7/1/25 in the morning, UA wnl at urgent care so sent home. Increasing disorientation so came to [facility]. Patient at baseline plays tennis 3x week and takes nap 1x daily but now needs to take a nap 3x a day and feels generalized weakness. States at baseline has numbness in distal bilateral lower extremities and now has numbness extending to below knees bilaterally. Recently traveled to [place] from Nov. 2024-May 2025, had chills and altered mental status in end of May while in [place] but no abdominal pain, went to ED there and d/c from ED with cipro for a week which resolved symptoms. Denies shaking/chills, fever, vision changes, runny nose, myalgias, and hematuria.     ED course:   vitals: T 97.8, HR 67, /88, RR 17, SpO2 99  labs: Hb 12.3, Hct 37.2, RDW 15.1, Lactate 0.6, UA wnl, Troponin wnl  EKG: sinus teresa rate 56 no acute ST changes  Imaging: CXR wet read, no consolidations, no congestive features, no ptx  s/p: vanc and cefepime, blood cultures x2 drawn    Vasile BURNS personally reviewed                        my note:    asked to see patient for mrsa/complex abdominal wound    pt c/o lower abdominal fullness/pressur tacos bladder:    s/p    1: total gastrectomy for cancer [place]  [facility]  2: Umbilical hernia repair 2015 [facility] [place]  3: Hiatal hernia repair 2018 [facility]  4: Cholecystectomy 2018 [place]  5: "Intestinal rupture" 2019 [place]  6: "intestinal rupture" 2019 [place]  7: "J" tube insertion 2020 [place]  8:  Removal of mesh 2020 [facility]                patient interviewed and examined in ED [place]#2      in no distress      afebrile    vitals stable    Heent-sclera anicteric      abdomen-complex midline scar with small fistulous opening weith seropurulent drainage, soft, non distended, non tender, no evident hernia      labs:    wbc 3.8  h/h  11/36  plt  218    k+4.4    creat 0.87    lfts-normal    ua-negative      imaging:  < from: CT Abdomen and Pelvis w/ IV Cont (07.02.25 @ 03:29) >  WEL:Status post total gastrectomy. Jejunal anastomosis.  Hiatal hernia   containing colon, unchanged. There is diffuse mural thickening involving   on the ventral bowel loops that abut the abdominal wall adjacent to the   pronounced abdominal wall diastases/abdominal wall defect. No bowel   obstruction. Appendix is not visualized, questioned appendectomy material   on 2, 71.  PERITONEUM/RETROPERITONEUM: Within normal limits.  VESSELS: Atherosclerotic changes.  LYMPH NODES: No lymphadenopathy.  ABDOMINAL WALL: Midline abdominal wall diastases/ abdominal wall defect   unchanged. There is no longer small hernia containing nonobstructing   bowel are old that was previously noted on in the upper midline abdomen   inferior to the level of the xiphoid.Hernia repair material in left   lower abdomen.  BONES: Degenerative changes.    IMPRESSION:  Compared to 9/13/2024 CT, interval resolution of previously noted small   hernia containing nonobstructed bowel that was seen inferior to the   xiphoid level.  There is interval mural thickening of the bowel loops adjacent to the   abdominal wall defect/ area of the diastases, which may be inflammatory.  No fluid collection.  Midline abdominal wall diathesis is otherwise unchanged.    Otherwise, no significant interval change since 9/13/2024 CT.    --- End of Report ---            SARAH LUX MD; Attending Radiologist  This document has been elec    < end of copied text >

## 2025-07-02 NOTE — PROGRESS NOTE ADULT - ASSESSMENT
Patient is a 80 yo male with a pmhx of HTN, MRSA, RCC s/p partial nephrectomy (2014), gastric cancer s/p gastrectomy (2014), bowel perforation (2019), chronic abdominal wound s/p perforation repair (2019), TIA (2/2024), SVT s/p ablation, s/p pacemaker for sinus pauses (2024) presenting to [facility] for increased drainage from abdominal wound and increased generalized weakness. Admitted for :    Chronic Abdominal Wound  CT AP with IV contrast showed interval resolution of previously noted small hernia, interval mural thickening of bowel loops adjacent to abdominal wall defect/area of diastases which may be inflammatory  Patient has chronic wound since 2019 2/2 perforated bowel  Continue zosyn and vanco, follow all cultures  Follow up ID consult    HTN  HLD  chronic conditions  continue home meds: amlodipine, zetia and ASA    DVT PPx  lovenox    Goals of Care  Full code     Patient is a 80 yo male with a pmhx of HTN, MRSA, RCC s/p partial nephrectomy (2014), gastric cancer s/p gastrectomy (2014), bowel perforation (2019), chronic abdominal wound s/p perforation repair (2019), TIA (2/2024), SVT s/p ablation, s/p pacemaker for sinus pauses (2024) presenting to [facility] for increased drainage from abdominal wound and increased generalized weakness. Admitted for :    Chronic Abdominal Wound  CT AP with IV contrast showed interval resolution of previously noted small hernia, interval mural thickening of bowel loops adjacent to abdominal wall defect/area of diastases which may be inflammatory  Patient has chronic wound since 2019 2/2 perforated bowel  Continue zosyn and vanco, follow all cultures  Follow up ID consult, will get surgery and wound care evaluation    HTN  HLD  chronic conditions  continue home meds: amlodipine, zetia and ASA    DVT PPx  lovenox    Goals of Care  Full code

## 2025-07-03 LAB
ANION GAP SERPL CALC-SCNC: 11 MMOL/L — SIGNIFICANT CHANGE UP (ref 5–17)
BUN SERPL-MCNC: 13 MG/DL — SIGNIFICANT CHANGE UP (ref 7–23)
CALCIUM SERPL-MCNC: 9 MG/DL — SIGNIFICANT CHANGE UP (ref 8.4–10.5)
CHLORIDE SERPL-SCNC: 105 MMOL/L — SIGNIFICANT CHANGE UP (ref 96–108)
CO2 SERPL-SCNC: 26 MMOL/L — SIGNIFICANT CHANGE UP (ref 22–31)
CREAT SERPL-MCNC: 0.9 MG/DL — SIGNIFICANT CHANGE UP (ref 0.5–1.3)
CULTURE RESULTS: NO GROWTH — SIGNIFICANT CHANGE UP
EGFR: 85 ML/MIN/1.73M2 — SIGNIFICANT CHANGE UP
EGFR: 85 ML/MIN/1.73M2 — SIGNIFICANT CHANGE UP
GLUCOSE SERPL-MCNC: 97 MG/DL — SIGNIFICANT CHANGE UP (ref 70–99)
HCT VFR BLD CALC: 35.8 % — LOW (ref 39–50)
HGB BLD-MCNC: 11.8 G/DL — LOW (ref 13–17)
MCHC RBC-ENTMCNC: 28.6 PG — SIGNIFICANT CHANGE UP (ref 27–34)
MCHC RBC-ENTMCNC: 33 G/DL — SIGNIFICANT CHANGE UP (ref 32–36)
MCV RBC AUTO: 86.9 FL — SIGNIFICANT CHANGE UP (ref 80–100)
NRBC BLD AUTO-RTO: 0 /100 WBCS — SIGNIFICANT CHANGE UP (ref 0–0)
PLATELET # BLD AUTO: 209 K/UL — SIGNIFICANT CHANGE UP (ref 150–400)
POTASSIUM SERPL-MCNC: 3.9 MMOL/L — SIGNIFICANT CHANGE UP (ref 3.5–5.3)
POTASSIUM SERPL-SCNC: 3.9 MMOL/L — SIGNIFICANT CHANGE UP (ref 3.5–5.3)
RBC # BLD: 4.12 M/UL — LOW (ref 4.2–5.8)
RBC # FLD: 15.1 % — HIGH (ref 10.3–14.5)
SODIUM SERPL-SCNC: 142 MMOL/L — SIGNIFICANT CHANGE UP (ref 135–145)
SPECIMEN SOURCE: SIGNIFICANT CHANGE UP
VANCOMYCIN TROUGH SERPL-MCNC: 13.5 UG/ML — SIGNIFICANT CHANGE UP (ref 10–20)
WBC # BLD: 4.56 K/UL — SIGNIFICANT CHANGE UP (ref 3.8–10.5)
WBC # FLD AUTO: 4.56 K/UL — SIGNIFICANT CHANGE UP (ref 3.8–10.5)

## 2025-07-03 PROCEDURE — 99233 SBSQ HOSP IP/OBS HIGH 50: CPT

## 2025-07-03 PROCEDURE — 85025 COMPLETE CBC W/AUTO DIFF WBC: CPT

## 2025-07-03 PROCEDURE — 93005 ELECTROCARDIOGRAM TRACING: CPT

## 2025-07-03 PROCEDURE — 0241U: CPT

## 2025-07-03 PROCEDURE — 84145 PROCALCITONIN (PCT): CPT

## 2025-07-03 PROCEDURE — 85027 COMPLETE CBC AUTOMATED: CPT

## 2025-07-03 PROCEDURE — 36415 COLL VENOUS BLD VENIPUNCTURE: CPT

## 2025-07-03 PROCEDURE — 80202 ASSAY OF VANCOMYCIN: CPT

## 2025-07-03 PROCEDURE — 87040 BLOOD CULTURE FOR BACTERIA: CPT

## 2025-07-03 PROCEDURE — 81003 URINALYSIS AUTO W/O SCOPE: CPT

## 2025-07-03 PROCEDURE — 80048 BASIC METABOLIC PNL TOTAL CA: CPT

## 2025-07-03 PROCEDURE — 74177 CT ABD & PELVIS W/CONTRAST: CPT

## 2025-07-03 PROCEDURE — 85610 PROTHROMBIN TIME: CPT

## 2025-07-03 PROCEDURE — 87086 URINE CULTURE/COLONY COUNT: CPT

## 2025-07-03 PROCEDURE — 83605 ASSAY OF LACTIC ACID: CPT

## 2025-07-03 PROCEDURE — 80053 COMPREHEN METABOLIC PANEL: CPT

## 2025-07-03 PROCEDURE — 70450 CT HEAD/BRAIN W/O DYE: CPT

## 2025-07-03 PROCEDURE — 85730 THROMBOPLASTIN TIME PARTIAL: CPT

## 2025-07-03 PROCEDURE — 71045 X-RAY EXAM CHEST 1 VIEW: CPT

## 2025-07-03 PROCEDURE — 84484 ASSAY OF TROPONIN QUANT: CPT

## 2025-07-03 RX ADMIN — Medication 250 MILLIGRAM(S): at 21:19

## 2025-07-03 RX ADMIN — Medication 25 GRAM(S): at 18:29

## 2025-07-03 RX ADMIN — Medication 250 MILLIGRAM(S): at 08:26

## 2025-07-03 RX ADMIN — Medication 81 MILLIGRAM(S): at 13:37

## 2025-07-03 RX ADMIN — EZETIMIBE 10 MILLIGRAM(S): 10 TABLET ORAL at 13:35

## 2025-07-03 RX ADMIN — Medication 1 TABLET(S): at 13:35

## 2025-07-03 RX ADMIN — AMLODIPINE BESYLATE 10 MILLIGRAM(S): 10 TABLET ORAL at 08:39

## 2025-07-03 RX ADMIN — Medication 2000 UNIT(S): at 13:34

## 2025-07-03 RX ADMIN — Medication 25 GRAM(S): at 09:40

## 2025-07-03 RX ADMIN — ENOXAPARIN SODIUM 40 MILLIGRAM(S): 100 INJECTION SUBCUTANEOUS at 09:40

## 2025-07-03 NOTE — CONSULT NOTE ADULT - REASON FOR ADMISSION
Increased drainage from abdominal wound

## 2025-07-03 NOTE — CONSULT NOTE ADULT - SUBJECTIVE AND OBJECTIVE BOX
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 He came in 2 days ago for feeling weak and a little confused. He has had no fever as best I can tell. He feels ok now. He feels like he was having some trouble urinating and like his prostate was inflamed. He has no vomiting, diarrhea, sob, abdo pain, hematuria, rash, joint swelling. He was in [place] and got sick in [place] while coming up [place] but nothing found. He reports going home on po cipro. He had klebsiella bacteremia in 9/24, no source apparent.     REVIEW OF SYSTEMS:  All other review of systems negative (Comprehensive ROS)    PAST MEDICAL & SURGICAL HISTORY:  HTN (hypertension)      Renal cell carcinoma, unspecified laterality  right      Pancreatitis  > 5 yrs ago      SVT (supraventricular tachycardia)  had ablation in 2018      Gastric cancer  2014      S/P gastrectomy  2014      S/P cholecystectomy  2018      H/O partial nephrectomy  2014 - right      H/O umbilical hernia repair  2015, bilat inguina hernia 15 yrs ago      S/P ACL repair  1997      H/O shoulder surgery  right yr 2000, left 2006      History of repair of hiatal hernia  2018      H/O knee surgery  right 2009 - meniscus          Allergies    sulfa drugs (Unknown)    Intolerances        Antimicrobials Day #  :3  piperacillin/tazobactam IVPB.. 3.375 Gram(s) IV Intermittent every 8 hours  vancomycin  IVPB 1000 milliGRAM(s) IV Intermittent every 12 hours    Other Medications:  acetaminophen     Tablet .. 650 milliGRAM(s) Oral every 6 hours PRN  aluminum hydroxide/magnesium hydroxide/simethicone Suspension 30 milliLiter(s) Oral every 4 hours PRN  amLODIPine   Tablet 10 milliGRAM(s) Oral daily  aspirin enteric coated 81 milliGRAM(s) Oral daily  cholecalciferol 2000 Unit(s) Oral daily  enoxaparin Injectable 40 milliGRAM(s) SubCutaneous every 24 hours  ezetimibe 10 milliGRAM(s) Oral daily  lactobacillus acidophilus 1 Tablet(s) Oral daily  melatonin 3 milliGRAM(s) Oral at bedtime PRN  ondansetron Injectable 4 milliGRAM(s) IV Push every 8 hours PRN      FAMILY HISTORY:  FH: type 2 diabetes (Father)    FH: breast cancer (Mother)        SOCIAL HISTORY:  Smoking: [ ]Yes [ ]No  ETOH: [ ]Yes [ ]No  Drug Use: [ ]Yes [ ]No   [ ] Single[ ]    T(F): 98.1 (07-03-25 @ 12:35), Max: 98.1 (07-03-25 @ 12:35)  HR: 72 (07-03-25 @ 12:35)  BP: 127/83 (07-03-25 @ 12:35)  RR: 17 (07-03-25 @ 12:35)  SpO2: 95% (07-03-25 @ 12:35)  Wt(kg): --    PHYSICAL EXAM:  General: alert, no acute distress  Eyes:  anicteric, no conjunctival injection, no discharge  Oropharynx: no lesions or injection 	  Neck: supple, without adenopathy  Lungs: clear to auscultation  Heart: regular rate and rhythm; no murmur, rubs or gallops  Abdomen: soft, nondistended, nontender, without mass or organomegaly. pinhole defect with scant drainage, no tenderness  Skin: no lesions  Extremities: no clubbing, cyanosis, or edema  Neurologic: alert, oriented, moves all extremities    LAB RESULTS:                        11.8   4.56  )-----------( 209      ( 03 Jul 2025 07:05 )             35.8     07-03    142  |  105  |  13  ----------------------------<  97  3.9   |  26  |  0.90    Ca    9.0      03 Jul 2025 07:05    TPro  7.1  /  Alb  3.4  /  TBili  1.1  /  DBili  x   /  AST  21  /  ALT  21  /  AlkPhos  79  07-02    LIVER FUNCTIONS - ( 02 Jul 2025 08:00 )  Alb: 3.4 g/dL / Pro: 7.1 g/dL / ALK PHOS: 79 U/L / ALT: 21 U/L / AST: 21 U/L / GGT: x           Urinalysis Basic - ( 03 Jul 2025 07:05 )    Color: x / Appearance: x / SG: x / pH: x  Gluc: 97 mg/dL / Ketone: x  / Bili: x / Urobili: x   Blood: x / Protein: x / Nitrite: x   Leuk Esterase: x / RBC: x / WBC x   Sq Epi: x / Non Sq Epi: x / Bacteria: x        MICROBIOLOGY:  RECENT CULTURES:  07-02 @ 02:00 Clean Catch Clean Catch (Midstream)     No growth      07-01 @ 16:30 Blood Blood-Peripheral     No growth at 24 hours            RADIOLOGY REVIEWED:  < from: CT Head No Cont (07.02.25 @ 03:29) >    ACC: [id number] EXAM:  CT BRAIN   ORDERED BY:  RUY HENDRICKS     PROCEDURE DATE:  07/02/2025          INTERPRETATION:  CLINICAL INFORMATION: Altered mental status.    COMPARISON: None.    CONTRAST:  IV Contrast: NONE  .    TECHNIQUE:  Serial axial images were obtained from the skull base to the   vertex using multi-slice helical technique. Sagittal and coronal   reformats were obtained.    FINDINGS:    VENTRICLES AND SULCI: Age appropriate involutional changes.  INTRA-AXIAL: No mass effect, acute hemorrhage, or midline shift.  There   are periventricular and subcortical white matter hypodensities,   consistent with microvascular type changes.  EXTRA-AXIAL: No mass or fluid collection. Basal cisterns are normal in   appearance.    VISUALIZED SINUSES:  Clear.  TYMPANOMASTOID CAVITIES:  Clear.  VISUALIZED ORBITS: Normal.  CALVARIUM: Intact.    MISCELLANEOUS: None.      IMPRESSION:  No evidence of acute intracranial pathology. If clinical symptoms persist   or worsen, more sensitive evaluation with brain MRI may be obtained, if   no contraindications exist.    < end of copied text >  ACC: [id number] EXAM:  CT ABDOMEN AND PELVIS IC   ORDERED BY: DIANA GOMEZ     PROCEDURE DATE:  07/02/2025          INTERPRETATION:  CLINICAL INFORMATION: Chronic abdominal wound,   increasing drainage and abdominal pain.    COMPARISON: 9/13/2024CT.    CONTRAST/COMPLICATIONS:  IV Contrast: Omnipaque 350  90 cc administered   10 cc discarded  Oral Contrast: NONE.    PROCEDURE:  CT of the Abdomen and Pelvis was performed.  Sagittal and coronal reformats were performed.    FINDINGS:  LOWER CHEST: Subcentimeter right middle lobe calcified granuloma. Mild   bibasilar atelectasis.    LIVER: Hepatic lobe small cyst unchanged.  BILE DUCTS: Normal caliber.  GALLBLADDER: Cholecystectomy.  SPLEEN: Numerous subcentimeter splenic calcifications appear stable,   likely from prior granulomatous disease.  PANCREAS: Within normal limits.  ADRENALS: Within normal limits.  KIDNEYS/URETERS: Right renal cyst.    BLADDER: Within normal limits.  REPRODUCTIVE ORGANS: Prostate within normal limits.    BOWEL:Status post total gastrectomy. Jejunal anastomosis.  Hiatal hernia   containing colon, unchanged. There is diffuse mural thickening involving   on the ventral bowel loops that abut the abdominal wall adjacent to the   pronounced abdominal wall diastases/abdominal wall defect. No bowel   obstruction. Appendix is not visualized, questioned appendectomy material   on 2, 71.  PERITONEUM/RETROPERITONEUM: Within normal limits.  VESSELS: Atherosclerotic changes.  LYMPH NODES: No lymphadenopathy.  ABDOMINAL WALL: Midline abdominal wall diastases/ abdominal wall defect   unchanged. There is no longer small hernia containing nonobstructing   bowel are old that was previously noted on in the upper midline abdomen   inferior to the level of the xiphoid.Hernia repair material in left   lower abdomen.  BONES: Degenerative changes.    IMPRESSION:  Compared to 9/13/2024 CT, interval resolution of previously noted small   hernia containing nonobstructed bowel that was seen inferior to the   xiphoid level.  There is interval mural thickening of the bowel loops adjacent to the   abdominal wall defect/ area of the diastases, which may be inflammatory.  No fluid collection.  Midline abdominal wall diathesis is otherwise unchanged.    Otherwise, no significant interval change since 9/13/2024 CT.          Impression:  80y male with gastrectomy for gastric ca, sb perforation s/p resection, infected hernia mesh with mrsa s/p subtotal mesh resection with chronically draining wound. He came in 2 days ago for feeling weak and a little confused. He has had no fever as best I can tell. He feels ok now. He feels like he was having some trouble urinating and like his prostate was inflamed. He has no vomiting, diarrhea, sob, abdo pain, hematuria, rash, joint swelling. He was in [place] and got sick in [place] but nothing found. He reports going home on po cipro. He had klebsiella bacteremia in 9/24, no source apparent. Exam is unrevealing for an active infection except for the pinhole fistula with scant drainage, imaging ok except bowel a bit thick near abdo wall so maybe there was some translocation, labs ok and he is with a normal ms and cx all neg including urine with neg ua    Recommendations:  for now vanco and zosyn  if he remains well overnight and cx neg  can change to minocycline and augmentin for 3 more days  f/u in my office

## 2025-07-03 NOTE — PROGRESS NOTE ADULT - NS ATTEND AMEND GEN_ALL_CORE FT
78 y/o M with PMH of HTN, MRSA, RCC s/p partial nephrectomy (2014), gastric cancer s/p gastrectomy (2014), bowel perforation (2019), chronic abdominal wound s/p perforation repair (2019), TIA (2/2024), SVT s/p ablation, s/p pacemaker for sinus pauses (2024) presenting to [facility] for increased drainage from abdominal wound and increased generalized weakness, admitted for management of chronic abd wound. Surgery, ID, Plastics consults appreciated. Afebrile, hemodynamically stable. Cont IV vanco, zosyn. follow blood cx. Cont home meds as indicated.
80 y/o M with PMH of HTN, MRSA, RCC s/p partial nephrectomy (2014), gastric cancer s/p gastrectomy (2014), bowel perforation (2019), chronic abdominal wound s/p perforation repair (2019), TIA (2/2024), SVT s/p ablation, s/p pacemaker for sinus pauses (2024) presenting to [facility] for increased drainage from abdominal wound and increased generalized weakness, admitted for management of chronic abd wound. Surgery, Plastics consults appreciated. Afebrile, hemodynamically stable. Cont IV vanco, zosyn - f/u ID consult. Cont home meds as indicated.

## 2025-07-03 NOTE — PROGRESS NOTE ADULT - ASSESSMENT
Patient is a 80 yo male with a pmhx of HTN, MRSA, RCC s/p partial nephrectomy (2014), gastric cancer s/p gastrectomy (2014), bowel perforation (2019), chronic abdominal wound s/p perforation repair (2019), TIA (2/2024), SVT s/p ablation, s/p pacemaker for sinus pauses (2024) presenting to [facility] for increased drainage from abdominal wound and increased generalized weakness. 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 Wan aware    HTN  HLD  chronic conditions  continue home meds: amlodipine, zetia and ASA    DVT PPx  lovenox    Goals of Care  Full code

## 2025-07-04 ENCOUNTER — TRANSCRIPTION ENCOUNTER (OUTPATIENT)
Age: 80
End: 2025-07-04

## 2025-07-04 VITALS
TEMPERATURE: 97 F | SYSTOLIC BLOOD PRESSURE: 120 MMHG | WEIGHT: 173.06 LBS | OXYGEN SATURATION: 94 % | RESPIRATION RATE: 17 BRPM | DIASTOLIC BLOOD PRESSURE: 68 MMHG | HEART RATE: 55 BPM

## 2025-07-04 PROCEDURE — 84145 PROCALCITONIN (PCT): CPT

## 2025-07-04 PROCEDURE — 96374 THER/PROPH/DIAG INJ IV PUSH: CPT

## 2025-07-04 PROCEDURE — 85027 COMPLETE CBC AUTOMATED: CPT

## 2025-07-04 PROCEDURE — 80048 BASIC METABOLIC PNL TOTAL CA: CPT

## 2025-07-04 PROCEDURE — 74177 CT ABD & PELVIS W/CONTRAST: CPT

## 2025-07-04 PROCEDURE — 87086 URINE CULTURE/COLONY COUNT: CPT

## 2025-07-04 PROCEDURE — 85610 PROTHROMBIN TIME: CPT

## 2025-07-04 PROCEDURE — 70450 CT HEAD/BRAIN W/O DYE: CPT

## 2025-07-04 PROCEDURE — 80053 COMPREHEN METABOLIC PANEL: CPT

## 2025-07-04 PROCEDURE — 99239 HOSP IP/OBS DSCHRG MGMT >30: CPT

## 2025-07-04 PROCEDURE — 84484 ASSAY OF TROPONIN QUANT: CPT

## 2025-07-04 PROCEDURE — 36415 COLL VENOUS BLD VENIPUNCTURE: CPT

## 2025-07-04 PROCEDURE — 93005 ELECTROCARDIOGRAM TRACING: CPT

## 2025-07-04 PROCEDURE — 87040 BLOOD CULTURE FOR BACTERIA: CPT

## 2025-07-04 PROCEDURE — 96375 TX/PRO/DX INJ NEW DRUG ADDON: CPT

## 2025-07-04 PROCEDURE — 83605 ASSAY OF LACTIC ACID: CPT

## 2025-07-04 PROCEDURE — 81003 URINALYSIS AUTO W/O SCOPE: CPT

## 2025-07-04 PROCEDURE — 85025 COMPLETE CBC W/AUTO DIFF WBC: CPT

## 2025-07-04 PROCEDURE — 0241U: CPT

## 2025-07-04 PROCEDURE — 85730 THROMBOPLASTIN TIME PARTIAL: CPT

## 2025-07-04 PROCEDURE — 87637 SARSCOV2&INF A&B&RSV AMP PRB: CPT

## 2025-07-04 PROCEDURE — 99285 EMERGENCY DEPT VISIT HI MDM: CPT

## 2025-07-04 PROCEDURE — 80202 ASSAY OF VANCOMYCIN: CPT

## 2025-07-04 PROCEDURE — 71045 X-RAY EXAM CHEST 1 VIEW: CPT

## 2025-07-04 RX ORDER — AMOXICILLIN AND CLAVULANATE POTASSIUM 500; 125 MG/1; MG/1
1 TABLET, FILM COATED ORAL
Qty: 6 | Refills: 0
Start: 2025-07-04

## 2025-07-04 RX ORDER — MINOCYCLINE HCL 50 MG
1 TABLET ORAL
Qty: 6 | Refills: 0
Start: 2025-07-04

## 2025-07-04 RX ORDER — LACTOBACILLUS ACIDOPHILUS/PECT 75 MM-100
1 CAPSULE ORAL
Qty: 6 | Refills: 0
Start: 2025-07-04

## 2025-07-04 RX ADMIN — AMLODIPINE BESYLATE 10 MILLIGRAM(S): 10 TABLET ORAL at 06:15

## 2025-07-04 RX ADMIN — Medication 25 GRAM(S): at 03:03

## 2025-07-04 RX ADMIN — ENOXAPARIN SODIUM 40 MILLIGRAM(S): 100 INJECTION SUBCUTANEOUS at 06:15

## 2025-07-04 NOTE — DISCHARGE NOTE NURSING/CASE MANAGEMENT/SOCIAL WORK - NSDCFUADDAPPT_GEN_ALL_CORE_FT
Case management office to make follow up appointment with Dr. Aline Jose [phone] on Monday 07/07 and will inform patient.

## 2025-07-04 NOTE — DISCHARGE NOTE NURSING/CASE MANAGEMENT/SOCIAL WORK - PATIENT PORTAL LINK FT
You can access the FollowMyHealth Patient Portal offered by [facility] by registering at the following website: [email or web address]. By joining "[email or web address]’s FollowMyHealth portal, you will also be able to view your health information using other applications (apps) compatible with our system.

## 2025-07-04 NOTE — DISCHARGE NOTE PROVIDER - NSDCMRMEDTOKEN_GEN_ALL_CORE_FT
amLODIPine 10 mg oral tablet: 1 tab(s) orally once a day  amoxicillin-clavulanate 875 mg-125 mg oral tablet: 1 tab(s) orally 2 times a day Start on evening of 7/4/25  aspirin 81 mg oral capsule: 1 cap(s) orally once a day  cholecalciferol 50 mcg (2000 intl units) oral tablet: 1 tab(s) orally once a day  cyanocobalamin 100 mcg oral tablet: 1 tab(s) orally once a day  ezetimibe 10 mg oral tablet: 1 tab(s) orally once a day  lactobacillus acidophilus oral tablet: 1 tab(s) orally 2 times a day take while on antibiotics  minocycline 100 mg oral capsule: 1 cap(s) orally 2 times a day Start on evening of 7/4/25

## 2025-07-04 NOTE — DISCHARGE NOTE PROVIDER - CARE PROVIDERS DIRECT ADDRESSES
[email or web address]

## 2025-07-04 NOTE — DISCHARGE NOTE NURSING/CASE MANAGEMENT/SOCIAL WORK - FINANCIAL ASSISTANCE
[facility] provides services at a reduced cost to those who are determined to be eligible through [facility]’s financial assistance program. Information regarding [facility]’s financial assistance program can be found by going to [email or web address] or by calling [phone].

## 2025-07-04 NOTE — DISCHARGE NOTE PROVIDER - CARE PROVIDER_API CALL
Abraham Wan  Infectious Disease  [address]  [place] [address]  Phone: [phone]  Fax: [phone]  Follow Up Time: 1-3 days    Aline Jose  Internal Medicine  [address]  [place] [address]  Phone: [phone]  Fax: [phone]  Follow Up Time: 1 week

## 2025-07-04 NOTE — PROGRESS NOTE ADULT - SUBJECTIVE AND OBJECTIVE BOX
PROGRESS NOTE:     Patient is a 80y old  Male who presents with a chief complaint of Increased drainage from abdominal wound (03 Jul 2025 19:55)          SUBJECTIVE & OBJECTIVE:   Pt seen and examined at bedside in AM    no overnight events.       REVIEW OF SYSTEMS: remaining ROS negative     PHYSICAL EXAM:  VITALS:  Vital Signs Last 24 Hrs  T(C): 36.3 (04 Jul 2025 05:11), Max: 36.7 (03 Jul 2025 12:35)  T(F): 97.4 (04 Jul 2025 05:11), Max: 98.1 (03 Jul 2025 12:35)  HR: 55 (04 Jul 2025 05:11) (55 - 72)  BP: 120/68 (04 Jul 2025 05:11) (109/67 - 127/83)  BP(mean): 86 (04 Jul 2025 05:11) (86 - 86)  RR: 17 (04 Jul 2025 05:11) (17 - 18)  SpO2: 94% (04 Jul 2025 05:11) (92% - 95%)    Parameters below as of 04 Jul 2025 05:11  Patient On (Oxygen Delivery Method): room air          GENERAL: NAD,  no increased WOB  HEAD:  Atraumatic, Normocephalic  EYES: EOMI, conjunctiva and sclera clear  ENMT: Moist mucous membranes  NECK: Supple, No JVD  NERVOUS SYSTEM:  Alert & Oriented X2, no focal neuro deficits   CHEST/LUNG: Clear to auscultation bilaterally; No rales, rhonchi, wheezing, or rubs  HEART: Regular rate and rhythm; No murmurs, rubs, or gallops  ABDOMEN: Soft, Nontender, Nondistended; dressing c/d/i  EXTREMITIES:  No clubbing, cyanosis, calf tenderness or edema b/l      MEDICATIONS  (STANDING):  amLODIPine   Tablet 10 milliGRAM(s) Oral daily  aspirin enteric coated 81 milliGRAM(s) Oral daily  cholecalciferol 2000 Unit(s) Oral daily  enoxaparin Injectable 40 milliGRAM(s) SubCutaneous every 24 hours  ezetimibe 10 milliGRAM(s) Oral daily  lactobacillus acidophilus 1 Tablet(s) Oral daily  piperacillin/tazobactam IVPB.. 3.375 Gram(s) IV Intermittent every 8 hours  vancomycin  IVPB 1000 milliGRAM(s) IV Intermittent every 12 hours    MEDICATIONS  (PRN):  acetaminophen     Tablet .. 650 milliGRAM(s) Oral every 6 hours PRN Temp greater or equal to 38C (100.4F), Mild Pain (1 - 3)  aluminum hydroxide/magnesium hydroxide/simethicone Suspension 30 milliLiter(s) Oral every 4 hours PRN Dyspepsia  melatonin 3 milliGRAM(s) Oral at bedtime PRN Insomnia  ondansetron Injectable 4 milliGRAM(s) IV Push every 8 hours PRN Nausea and/or Vomiting      Allergies    sulfa drugs (Unknown)    Intolerances              LABS:                           11.8   4.56  )-----------( 209      ( 03 Jul 2025 07:05 )             35.8     07-03    142  |  105  |  13  ----------------------------<  97  3.9   |  26  |  0.90    Ca    9.0      03 Jul 2025 07:05        Urinalysis Basic - ( 03 Jul 2025 07:05 )    Color: x / Appearance: x / SG: x / pH: x  Gluc: 97 mg/dL / Ketone: x  / Bili: x / Urobili: x   Blood: x / Protein: x / Nitrite: x   Leuk Esterase: x / RBC: x / WBC x   Sq Epi: x / Non Sq Epi: x / Bacteria: x      CAPILLARY BLOOD GLUCOSE                          RECENT CULTURES:    Culture - Urine (collected 02 Jul 2025 02:00)  Source: Clean Catch Clean Catch (Midstream)  Final Report (03 Jul 2025 08:37):    No growth    Urinalysis with Rflx Culture (collected 01 Jul 2025 17:05)    Culture - Blood (collected 01 Jul 2025 16:30)  Source: Blood Blood-Peripheral  Preliminary Report (04 Jul 2025 01:02):    No growth at 48 Hours    Culture - Blood (collected 01 Jul 2025 16:30)  Source: Blood Blood-Peripheral  Preliminary Report (04 Jul 2025 01:02):    No growth at 48 Hours    RADIOLOGY & ADDITIONAL TESTS:    < from: CT Abdomen and Pelvis w/ IV Cont (07.02.25 @ 03:29) >  IMPRESSION:  Compared to 9/13/2024 CT, interval resolution of previously noted small   hernia containing nonobstructed bowel that was seen inferior to the   xiphoid level.  There is interval mural thickening of the bowel loops adjacent to the   abdominal wall defect/ area of the diastases, which may be inflammatory.  No fluid collection.  Midline abdominal wall diathesis is otherwise unchanged.    Otherwise, no significant interval change since 9/13/2024 CT.    < end of copied text >  
Patient is a 79y old  Male who presents with a chief complaint of Increased drainage from abdominal wound (01 Jul 2025 19:43)    Patient seen and examined at bedside.  no acute overnight events    ALLERGIES:  sulfa drugs (Unknown)        Vital Signs Last 24 Hrs  T(F): 97.9 (02 Jul 2025 06:15), Max: 98.7 (01 Jul 2025 17:57)  HR: 55 (02 Jul 2025 06:15) (55 - 67)  BP: 131/77 (02 Jul 2025 06:15) (127/81 - 161/88)  RR: 16 (02 Jul 2025 06:15) (16 - 20)  SpO2: 97% (02 Jul 2025 06:15) (97% - 99%)  I&O's Summary    MEDICATIONS:  acetaminophen     Tablet .. 650 milliGRAM(s) Oral every 6 hours PRN  aluminum hydroxide/magnesium hydroxide/simethicone Suspension 30 milliLiter(s) Oral every 4 hours PRN  amLODIPine   Tablet 10 milliGRAM(s) Oral daily  aspirin enteric coated 81 milliGRAM(s) Oral daily  cholecalciferol 2000 Unit(s) Oral daily  enoxaparin Injectable 40 milliGRAM(s) SubCutaneous every 24 hours  ezetimibe 10 milliGRAM(s) Oral daily  melatonin 3 milliGRAM(s) Oral at bedtime PRN  ondansetron Injectable 4 milliGRAM(s) IV Push every 8 hours PRN  piperacillin/tazobactam IVPB.. 3.375 Gram(s) IV Intermittent every 8 hours  vancomycin  IVPB 1000 milliGRAM(s) IV Intermittent every 12 hours      PHYSICAL EXAM:  General: NAD, A/O x 3  ENT: MMM, no thrush  Neck: Supple, No JVD  Lungs: Clear to auscultation bilaterally, non labored, good air entry  Cardio: RRR, S1/S2, No murmurs  Abdomen: Soft, mild hypogastric tenderness, Nondistended; Chronic abdominal wound  Extremities: No cyanosis, No edema    LABS:                        11.9   3.87  )-----------( 218      ( 02 Jul 2025 08:00 )             36.5     07-02    138  |  103  |  15  ----------------------------<  101  4.4   |  26  |  0.87    Ca    8.9      02 Jul 2025 08:00    TPro  7.1  /  Alb  3.4  /  TBili  1.1  /  DBili  x   /  AST  21  /  ALT  21  /  AlkPhos  79  07-02      PT/INR - ( 01 Jul 2025 16:30 )   PT: 11.4 sec;   INR: 0.97 ratio         PTT - ( 01 Jul 2025 16:30 )  PTT:33.9 sec  Lactate, Blood: 0.6 mmol/L (07-01 @ 16:30)    CARDIAC MARKERS ( 01 Jul 2025 16:30 )  x     / 5.3 ng/L / x     / x     / x                            Urinalysis Basic - ( 02 Jul 2025 08:00 )    Color: x / Appearance: x / SG: x / pH: x  Gluc: 101 mg/dL / Ketone: x  / Bili: x / Urobili: x   Blood: x / Protein: x / Nitrite: x   Leuk Esterase: x / RBC: x / WBC x   Sq Epi: x / Non Sq Epi: x / Bacteria: x            RADIOLOGY & ADDITIONAL TESTS:    Care Discussed with Consultants/Other Providers:   
Patient is a 80y old  Male who presents with a chief complaint of Increased drainage from abdominal wound (02 Jul 2025 14:16)    Patient seen and examined at bedside.  no acute overnight events    ALLERGIES:  sulfa drugs (Unknown)        Vital Signs Last 24 Hrs  T(F): 97.7 (03 Jul 2025 05:46), Max: 98.1 (02 Jul 2025 12:00)  HR: 66 (03 Jul 2025 05:46) (58 - 71)  BP: 118/75 (03 Jul 2025 08:31) (110/70 - 143/80)  RR: 18 (03 Jul 2025 05:46) (17 - 18)  SpO2: 97% (03 Jul 2025 05:46) (94% - 98%)  I&O's Summary    02 Jul 2025 07:01  -  03 Jul 2025 07:00  --------------------------------------------------------  IN: 0 mL / OUT: 200 mL / NET: -200 mL      MEDICATIONS:  acetaminophen     Tablet .. 650 milliGRAM(s) Oral every 6 hours PRN  aluminum hydroxide/magnesium hydroxide/simethicone Suspension 30 milliLiter(s) Oral every 4 hours PRN  amLODIPine   Tablet 10 milliGRAM(s) Oral daily  aspirin enteric coated 81 milliGRAM(s) Oral daily  cholecalciferol 2000 Unit(s) Oral daily  enoxaparin Injectable 40 milliGRAM(s) SubCutaneous every 24 hours  ezetimibe 10 milliGRAM(s) Oral daily  lactobacillus acidophilus 1 Tablet(s) Oral daily  melatonin 3 milliGRAM(s) Oral at bedtime PRN  ondansetron Injectable 4 milliGRAM(s) IV Push every 8 hours PRN  piperacillin/tazobactam IVPB.. 3.375 Gram(s) IV Intermittent every 8 hours  vancomycin  IVPB 1000 milliGRAM(s) IV Intermittent every 12 hours      PHYSICAL EXAM:  General: NAD, A/O x 3  ENT: MMM, no thrush  Neck: Supple, No JVD  Lungs: Clear to auscultation bilaterally, non labored, good air entry  Cardio: RRR, S1/S2, No murmurs  Abdomen: Soft, Nontender, Nondistended; Bowel sounds present, chronic abdominal wound  Extremities: No cyanosis, No edema    LABS:                        11.8   4.56  )-----------( 209      ( 03 Jul 2025 07:05 )             35.8     07-03    142  |  105  |  13  ----------------------------<  97  3.9   |  26  |  0.90    Ca    9.0      03 Jul 2025 07:05    TPro  7.1  /  Alb  3.4  /  TBili  1.1  /  DBili  x   /  AST  21  /  ALT  21  /  AlkPhos  79  07-02      PT/INR - ( 01 Jul 2025 16:30 )   PT: 11.4 sec;   INR: 0.97 ratio         PTT - ( 01 Jul 2025 16:30 )  PTT:33.9 sec  Lactate, Blood: 0.6 mmol/L (07-01 @ 16:30)    CARDIAC MARKERS ( 01 Jul 2025 16:30 )  x     / 5.3 ng/L / x     / x     / x                            Urinalysis Basic - ( 03 Jul 2025 07:05 )    Color: x / Appearance: x / SG: x / pH: x  Gluc: 97 mg/dL / Ketone: x  / Bili: x / Urobili: x   Blood: x / Protein: x / Nitrite: x   Leuk Esterase: x / RBC: x / WBC x   Sq Epi: x / Non Sq Epi: x / Bacteria: x        Culture - Urine (collected 02 Jul 2025 02:00)  Source: Clean Catch Clean Catch (Midstream)  Final Report (03 Jul 2025 08:37):    No growth    Culture - Blood (collected 01 Jul 2025 16:30)  Source: Blood Blood-Peripheral  Preliminary Report (03 Jul 2025 01:03):    No growth at 24 hours    Culture - Blood (collected 01 Jul 2025 16:30)  Source: Blood Blood-Peripheral  Preliminary Report (03 Jul 2025 01:03):    No growth at 24 hours          RADIOLOGY & ADDITIONAL TESTS:    Care Discussed with Consultants/Other Providers:

## 2025-07-04 NOTE — DISCHARGE NOTE PROVIDER - HOSPITAL COURSE
HPI:  Patient is a 78 yo male with a pmhx of HTN, MRSA, RCC s/p partial nephrectomy (2014), gastric cancer s/p gastrectomy (2014), bowel perforation (2019), chronic abdominal wound s/p perforation repair (2019), TIA (2/2024), SVT s/p ablation, s/p pacemaker for sinus pauses (2024) presenting to [facility] for increased drainage from abdominal wound and increased generalized weakness.     Patient reports 2 days new onset low energy, mild generalized headache, increased bilateral LE numbness, and 1 day new onset hypogastric pain with urinary urgency and increased drainage from abdominal wound. Went to urgent care on 7/1/25 in the morning, UA wnl at urgent care so sent home. Increasing disorientation so came to [facility]. Patient at baseline plays tennis 3x week and takes nap 1x daily but now needs to take a nap 3x a day and feels generalized weakness. States at baseline has numbness in distal bilateral lower extremities and now has numbness extending to below knees bilaterally. Recently traveled to [place] from Nov. 2024-May 2025, had chills and altered mental status in end of May while in [place] but no abdominal pain, went to ED there and d/c from ED with cipro for a week which resolved symptoms. Denies shaking/chills, fever, vision changes, runny nose, myalgias, and hematuria.     ED course:   vitals: T 97.8, HR 67, /88, RR 17, SpO2 99  labs: Hb 12.3, Hct 37.2, RDW 15.1, Lactate 0.6, UA wnl, Troponin wnl  EKG: sinus teresa rate 56 no acute ST changes  Imaging: CXR wet read, no consolidations, no congestive features, no ptx  s/p: vanc and cefepime, blood cultures x2 drawn    [facility] (01 Jul 2025 19:43)      Hospital Course:    Pt admitted for Chronic Abdominal Wound. CT AP with IV contrast showed interval resolution of previously noted small hernia, interval mural thickening of bowel loops adjacent to abdominal wall defect/area of diastases which may be inflammatory. Patient has chronic wound since 2019 2/2 perforated bowelPt was started on zosyn and vanco, blood cultures negative to date. Surgery appreciated, chronic MRSA with retained mesh, pt with fistula last six years, surgery CI in present presentationPlastics appreciated, local wound care with dry sterile dressing daily to abdomen .ID evaluation, Dr. Wan: discharge on minocycline and augmentin. out pt follow-up in 3 days  Pt to follow up with ID outpt. Pt remained hemodynamically stable and deemed stable for discharge      HTN  HLD  chronic conditions  continue home meds: amlodipine, zetia and ASA

## 2025-07-04 NOTE — DISCHARGE NOTE PROVIDER - NSDCCPCAREPLAN_GEN_ALL_CORE_FT
PRINCIPAL DISCHARGE DIAGNOSIS  Diagnosis: Chronic abdominal wound infection  Assessment and Plan of Treatment: You are prescribed two antibiotics; Minocycline and Augmentin. Take by mouth as directed  Change the dressing daily with sterile dressing  Follow with infectious disease in 3 days

## 2025-07-04 NOTE — PROGRESS NOTE ADULT - ASSESSMENT
Patient is a 80 yo male with a pmhx of HTN, MRSA, RCC s/p partial nephrectomy (2014), gastric cancer s/p gastrectomy (2014), bowel perforation (2019), chronic abdominal wound s/p perforation repair (2019), TIA (2/2024), SVT s/p ablation, s/p pacemaker for sinus pauses (2024) presenting to [facility] for increased drainage from abdominal wound and increased generalized weakness. Admitted for :    Chronic Abdominal Wound  CT AP with IV contrast showed interval resolution of previously noted small hernia, interval mural thickening of bowel loops adjacent to abdominal wall defect/area of diastases which may be inflammatory  Patient has chronic wound since 2019 2/2 perforated bowel  Continue zosyn and vanco, blood cultures negative to date  Surgery appreciated, chronic MRSA with retained mesh, pt with fistula last six years, surgery CI in present presentation  Plastics appreciated, local wound care with dry sterile dressing daily to abdomen  ID evaluation, Dr. Wan: discharge on minocycline and augmentin. out pt follow-up in 3 days    HTN  HLD  chronic conditions  continue home meds: amlodipine, zetia and ASA    DVT PPx  lovenox    Goals of Care  Full code

## 2025-07-04 NOTE — DISCHARGE NOTE PROVIDER - PROVIDER TOKENS
PROVIDER:[TOKEN:[[id number]],FOLLOWUP:[1-3 days]],PROVIDER:[TOKEN:[[id number]],FOLLOWUP:[1 week]]
